# Patient Record
Sex: FEMALE | Race: WHITE | Employment: OTHER | URBAN - METROPOLITAN AREA
[De-identification: names, ages, dates, MRNs, and addresses within clinical notes are randomized per-mention and may not be internally consistent; named-entity substitution may affect disease eponyms.]

---

## 2018-03-14 ENCOUNTER — HOSPITAL ENCOUNTER (OUTPATIENT)
Dept: PREADMISSION TESTING | Age: 78
Discharge: HOME | End: 2018-03-14
Payer: MEDICARE

## 2018-03-14 VITALS
HEIGHT: 61 IN | SYSTOLIC BLOOD PRESSURE: 146 MMHG | HEART RATE: 74 BPM | DIASTOLIC BLOOD PRESSURE: 73 MMHG | WEIGHT: 141 LBS | BODY MASS INDEX: 26.62 KG/M2 | TEMPERATURE: 97.6 F

## 2018-03-14 LAB
ABO + RH BLD: NORMAL
ANION GAP SERPL CALC-SCNC: 6 MMOL/L (ref 5–15)
APPEARANCE UR: ABNORMAL
ATRIAL RATE: 73 BPM
BACTERIA URNS QL MICRO: ABNORMAL /HPF
BILIRUB UR QL: NEGATIVE
BLOOD GROUP ANTIBODIES SERPL: NORMAL
BUN SERPL-MCNC: 16 MG/DL (ref 6–20)
BUN/CREAT SERPL: 21 (ref 12–20)
CALCIUM SERPL-MCNC: 9.2 MG/DL (ref 8.5–10.1)
CALCULATED P AXIS, ECG09: 74 DEGREES
CALCULATED R AXIS, ECG10: 56 DEGREES
CALCULATED T AXIS, ECG11: 143 DEGREES
CHLORIDE SERPL-SCNC: 106 MMOL/L (ref 97–108)
CO2 SERPL-SCNC: 30 MMOL/L (ref 21–32)
COLOR UR: ABNORMAL
CREAT SERPL-MCNC: 0.75 MG/DL (ref 0.55–1.02)
DIAGNOSIS, 93000: NORMAL
EPITH CASTS URNS QL MICRO: ABNORMAL /LPF
ERYTHROCYTE [DISTWIDTH] IN BLOOD BY AUTOMATED COUNT: 12.3 % (ref 11.5–14.5)
EST. AVERAGE GLUCOSE BLD GHB EST-MCNC: 103 MG/DL
GLUCOSE SERPL-MCNC: 97 MG/DL (ref 65–100)
GLUCOSE UR STRIP.AUTO-MCNC: NEGATIVE MG/DL
HBA1C MFR BLD: 5.2 % (ref 4.2–6.3)
HCT VFR BLD AUTO: 40.9 % (ref 35–47)
HGB BLD-MCNC: 13.4 G/DL (ref 11.5–16)
HGB UR QL STRIP: NEGATIVE
HYALINE CASTS URNS QL MICRO: ABNORMAL /LPF (ref 0–5)
INR PPP: 1 (ref 0.9–1.1)
KETONES UR QL STRIP.AUTO: NEGATIVE MG/DL
LEUKOCYTE ESTERASE UR QL STRIP.AUTO: ABNORMAL
MCH RBC QN AUTO: 31.5 PG (ref 26–34)
MCHC RBC AUTO-ENTMCNC: 32.8 G/DL (ref 30–36.5)
MCV RBC AUTO: 96 FL (ref 80–99)
NITRITE UR QL STRIP.AUTO: POSITIVE
NRBC # BLD: 0 K/UL (ref 0–0.01)
NRBC BLD-RTO: 0 PER 100 WBC
P-R INTERVAL, ECG05: 138 MS
PH UR STRIP: 6.5 [PH] (ref 5–8)
PLATELET # BLD AUTO: 270 K/UL (ref 150–400)
PMV BLD AUTO: 11.1 FL (ref 8.9–12.9)
POTASSIUM SERPL-SCNC: 4.3 MMOL/L (ref 3.5–5.1)
PROT UR STRIP-MCNC: NEGATIVE MG/DL
PROTHROMBIN TIME: 10 SEC (ref 9–11.1)
Q-T INTERVAL, ECG07: 388 MS
QRS DURATION, ECG06: 76 MS
QTC CALCULATION (BEZET), ECG08: 477 MS
RBC # BLD AUTO: 4.26 M/UL (ref 3.8–5.2)
RBC #/AREA URNS HPF: ABNORMAL /HPF (ref 0–5)
SODIUM SERPL-SCNC: 142 MMOL/L (ref 136–145)
SP GR UR REFRACTOMETRY: 1.02 (ref 1–1.03)
SPECIMEN EXP DATE BLD: NORMAL
UA: UC IF INDICATED,UAUC: ABNORMAL
UROBILINOGEN UR QL STRIP.AUTO: 0.2 EU/DL (ref 0.2–1)
VENTRICULAR RATE, ECG03: 91 BPM
WBC # BLD AUTO: 7.2 K/UL (ref 3.6–11)
WBC URNS QL MICRO: ABNORMAL /HPF (ref 0–4)

## 2018-03-14 PROCEDURE — 85610 PROTHROMBIN TIME: CPT | Performed by: ORTHOPAEDIC SURGERY

## 2018-03-14 PROCEDURE — 86900 BLOOD TYPING SEROLOGIC ABO: CPT | Performed by: ORTHOPAEDIC SURGERY

## 2018-03-14 PROCEDURE — 87086 URINE CULTURE/COLONY COUNT: CPT | Performed by: ORTHOPAEDIC SURGERY

## 2018-03-14 PROCEDURE — 87077 CULTURE AEROBIC IDENTIFY: CPT | Performed by: ORTHOPAEDIC SURGERY

## 2018-03-14 PROCEDURE — 93005 ELECTROCARDIOGRAM TRACING: CPT

## 2018-03-14 PROCEDURE — 81001 URINALYSIS AUTO W/SCOPE: CPT | Performed by: ORTHOPAEDIC SURGERY

## 2018-03-14 PROCEDURE — 87186 SC STD MICRODIL/AGAR DIL: CPT | Performed by: ORTHOPAEDIC SURGERY

## 2018-03-14 PROCEDURE — 80048 BASIC METABOLIC PNL TOTAL CA: CPT | Performed by: ORTHOPAEDIC SURGERY

## 2018-03-14 PROCEDURE — 83036 HEMOGLOBIN GLYCOSYLATED A1C: CPT | Performed by: ORTHOPAEDIC SURGERY

## 2018-03-14 PROCEDURE — 85027 COMPLETE CBC AUTOMATED: CPT | Performed by: ORTHOPAEDIC SURGERY

## 2018-03-14 RX ORDER — ALBUTEROL SULFATE 90 UG/1
2 AEROSOL, METERED RESPIRATORY (INHALATION)
Status: ON HOLD | COMMUNITY

## 2018-03-14 RX ORDER — TOLTERODINE 4 MG/1
4 CAPSULE, EXTENDED RELEASE ORAL DAILY
Status: ON HOLD | COMMUNITY

## 2018-03-14 RX ORDER — RANITIDINE 150 MG/1
150 TABLET, FILM COATED ORAL 2 TIMES DAILY
COMMUNITY
End: 2022-10-16

## 2018-03-14 RX ORDER — CALCIUM CARBONATE/VITAMIN D3 500 MG-10
1 TABLET ORAL DAILY
Status: ON HOLD | COMMUNITY
End: 2021-04-26

## 2018-03-14 RX ORDER — BUDESONIDE AND FORMOTEROL FUMARATE DIHYDRATE 80; 4.5 UG/1; UG/1
2 AEROSOL RESPIRATORY (INHALATION) 2 TIMES DAILY
COMMUNITY
End: 2018-08-28

## 2018-03-14 RX ORDER — LEVOTHYROXINE SODIUM 100 UG/1
100 TABLET ORAL
Status: ON HOLD | COMMUNITY

## 2018-03-14 RX ORDER — DEXTROMETHORPHAN HYDROBROMIDE, GUAIFENESIN 5; 100 MG/5ML; MG/5ML
650 LIQUID ORAL EVERY 8 HOURS
COMMUNITY
End: 2018-04-10

## 2018-03-14 RX ORDER — MONTELUKAST SODIUM 10 MG/1
10 TABLET ORAL DAILY
Status: ON HOLD | COMMUNITY

## 2018-03-14 NOTE — ADVANCED PRACTICE NURSE
Preoperative instructions reviewed with patient. Patient given 2-6 packs of CHG wipes. Instructions reviewed and reviewed in class on use of CHG wipes. Patient given SSI infection FAQS sheet, as well as a  MRSA/MSSA treatment instruction sheet  With an explanation to patient that they will be notified if treatment instructions need to be initiated. Patient was given the opportunity to ask questions on the information provided. Orthopedic pre-op assessment and planning form sent for scanning.

## 2018-03-15 LAB
BACTERIA SPEC CULT: NORMAL
BACTERIA SPEC CULT: NORMAL
SERVICE CMNT-IMP: NORMAL

## 2018-03-16 LAB
BACTERIA SPEC CULT: ABNORMAL
CC UR VC: ABNORMAL
SERVICE CMNT-IMP: ABNORMAL

## 2018-03-16 NOTE — PERIOP NOTES
Faxed PAT testing reports (and fax confirmation received) to 's office. Called at 3:50 pm on 2/16/18 spoke with Carlos Lindo RE: abnormal urine culture. Informed Carlos Lindo that NP would treat.

## 2018-03-19 PROBLEM — K21.9 GERD (GASTROESOPHAGEAL REFLUX DISEASE): Status: ACTIVE | Noted: 2018-03-19

## 2018-03-19 PROBLEM — H91.93 BILATERAL HEARING LOSS: Status: ACTIVE | Noted: 2018-03-19

## 2018-03-19 PROBLEM — J44.9 COPD (CHRONIC OBSTRUCTIVE PULMONARY DISEASE) (HCC): Status: ACTIVE | Noted: 2018-03-19

## 2018-03-19 RX ORDER — SULFAMETHOXAZOLE AND TRIMETHOPRIM 800; 160 MG/1; MG/1
1 TABLET ORAL 2 TIMES DAILY
Qty: 10 TAB | Refills: 0 | Status: SHIPPED | OUTPATIENT
Start: 2018-03-19 | End: 2018-03-24

## 2018-03-19 NOTE — ADVANCED PRACTICE NURSE
Patient is a 77 y/o female seen as a standard pre-op appointment on 3/14/18. Reviewed urine culture results on 3/19/18 and results were >100,000 colonies E. coli. Prescription for Bactrim DS e-prescribed to pharmacy of choice. Patient states she has baseline urinary frequency, taking Detrol for many years. Patient notified of prescription and possible side effects, and verbalized understanding of treatment regimen, goals of therapy, and UTI preventive measures. Provided contact info for further questions and reasons to follow up with PCP/surgeon, if needed.     CLARA Mckenna

## 2018-04-06 NOTE — H&P
[facility]  Location: [place] [address] [address]  Patient #: [id number]  : 1940   / Language: English / Race: White  Female      History of Present Illness The patient is a 78year old female who presents to the practice today for a transition into care. Additional reasons for visit:    Hip Pain is described as the following: The onset of the hip pain has been gradual and has been occurring in a persistent pattern for 6 months. The course has been gradually worsening. The hip pain is described as a severe dull aching. The hip pain is described as being located in the hip (right). The pain is aggravated by lying on affected side. Note for \"Hip Pain\": Patient's chief complaint is bilateral lower extremity pain. Right is worse than left. She has also some lumbar spinal stenosis symptoms. Her right hip is painful. She has to lift her leg into and out of the car. She has lost significant range of motion of the right side. She was seen by another orthopedist to discuss hip replacement. She's now seeing me to discuss hip replacement. Problem List/Past Medical REVIEW OF SYSTEMS: Systems were reviewed by the provider.    STENOSIS (724.02)    DDD (degenerative disc disease), lumbar (722.52  M51.36)    SPONDYLOLISTHESIS, ACQ. (738.4)    Hypertension, goal below 140/90 (401.9  I10)    Low back pain (724.2  M54.5)    Lumbar spondylosis (721.3) (721.3  M47.816)    Unilateral osteoarthritis of hip, right (715.95  M16.11)    Elective surgery (V50.9) (V50.9  Z41. 9)    SCIATICA (724.3) (724.3  M54.30)    Aftercare following surgery of the musculoskeletal system (V58.78  Z47.89)    Radiculopathy of lumbar region (724.4)      Allergies Kelli Waldron; 2018 1:24 PM)  No Known Allergies  [2018]:  No Known Drug Allergies  [2018]: Allergies Reconciled      Family History Bladder problems      Social History   Exercise   2013: Walks 3-4 times a week.   Tobacco / smoke exposure   None. Tobacco use   0.5 packs per day, started smoking in 1943 at age 5, quit smoking in 26 at age 41 (25 pack years), smokes 0 cigar(s) per week, uses 0 can(s) of smokeless tobacco per week. Illicit drug use   86/05/3410: none  Seat Belt Use   07/08/2013: always  HIV risk factors   07/08/2013: no  Sun Exposure   07/08/2013: frequently  Alcohol use   07/08/2013: Drinks wine 7 times per week having 1-2 drinks per occasion, never having more than 5 drinks per occasion. Caffeine use   07/08/2013: Drinks coffee 7 or more times a day. Medication History  Levothyroxine Sodium  (88MCG Tablet, Oral AS directed) Active. Spiriva HandiHaler  (18MCG Capsule, Inhalation DAILy) Active. VESIcare  (5MG Tablet, Oral AS directed) Active. Vitamin D  (1000UNIT Capsule, 2 Oral DAILy) Active. Multiple Vitamin  (1 Oral Daily) Active. Calcium/Vitamin D/Minerals  (1 Oral AS directed) Specific strength unknown - Active. Aspirin  (81MG Tablet, 1 Oral DAILy) Active. Tylenol  (325MG Tablet, Oral) Active. Medications Reconciled     Past Surgical History   Hysterectomy   non-cancerous: partial  Other Eye Surgery   bilateral  Neck Disc Surgery    Gallbladder Surgery   laparoscopic  Tonsillectomy    Anal Fissure Repair      Other Problems  Pulmonary Disease    Asthma    Thyroid Disease    Unspecified Diagnosis          Review of Systems   General Not Present- Appetite Loss, Chills, Fatigue, Fever, Night Sweats, Weight Gain and Weight Loss. Skin Not Present- Itching, Nail Changes, Poor Wound Healing, Rash, Skin Color Changes, Suspicious Lesions and Yellowish Skin Color. Respiratory Present- Difficulty Breathing. Not Present- Bloody sputum, Chronic Cough, Snoring, Wakes up from Sleep Wheezing or Short of Breath and Wheezing. Cardiovascular Present- Difficulty Breathing On Exertion.  Not Present- Bluish Discoloration Of Lips Or Nails, Chest Pain, Difficulty Breathing Lying Down, Leg Cramps With Exertion, Palpitations and Swelling of Extremities. Gastrointestinal Not Present- Abdominal Pain, Black, Tarry Stool, Change in Bowel Habits, Cirrhosis, Constipation, Diarrhea, Difficulty Swallowing, Nausea and Vomiting. Musculoskeletal Not Present- Back Pain, Joint Pain, Joint Stiffness and Joint Swelling. Neurological Present- Unsteadiness. Not Present- Fainting, Headaches, Memory Loss, Numbness, Seizures, Tingling, Tremor and Weakness. Vitals  Weight: 140 lb   Height: 61 in   Body Surface Area: 1.62 m²   Body Mass Index: 26.45 kg/m²        Physical Exam  Musculoskeletal  Global Assessment  Examination of related systems reveals - well-developed, well-nourished, in no acute distress, alert and oriented x 3. Right Lower Extremity - Note: Patient's right hip was examined. She has very limited range of motion. Impingement test and logroll tests are both positive. Her right lower extremity is otherwise sensate and perfused. Her hip extends fully and flexes to 90°. Assessment & Plan   Primary osteoarthritis of right hip (715.15  M16.11)  Impression: Severe DJD of the right hip. X-rays and exam are consistent with this. We discussed options. She would like to proceed. Right total hip that needs to discuss this point. All questions were answered. Information was given. She can call back to schedule. Current Plans  Pt Education - How to access health information online: discussed with patient and provided information. Pt Education - Educational materials were provided.: discussed with patient and provided information. X-RAY EXAM OF HIP COMPLETE min 2 VIEWS (12349) (Right 3 views of her right hip. End-stage DJD of the right hip.  Bone to bone.)  REVIEW OF SYSTEMS: Systems were reviewed by the provider.(V49.9)        Signed by Anthony Culver MD

## 2018-04-09 ENCOUNTER — ANESTHESIA (OUTPATIENT)
Dept: SURGERY | Age: 78
DRG: 470 | End: 2018-04-09
Payer: MEDICARE

## 2018-04-09 ENCOUNTER — HOSPITAL ENCOUNTER (INPATIENT)
Age: 78
LOS: 1 days | Discharge: HOME HEALTH | DRG: 470 | End: 2018-04-10
Attending: ORTHOPAEDIC SURGERY | Admitting: ORTHOPAEDIC SURGERY
Payer: MEDICARE

## 2018-04-09 ENCOUNTER — APPOINTMENT (OUTPATIENT)
Dept: GENERAL RADIOLOGY | Age: 78
DRG: 470 | End: 2018-04-09
Attending: ORTHOPAEDIC SURGERY
Payer: MEDICARE

## 2018-04-09 ENCOUNTER — APPOINTMENT (OUTPATIENT)
Dept: GENERAL RADIOLOGY | Age: 78
DRG: 470 | End: 2018-04-09
Attending: PHYSICIAN ASSISTANT
Payer: MEDICARE

## 2018-04-09 ENCOUNTER — ANESTHESIA EVENT (OUTPATIENT)
Dept: SURGERY | Age: 78
DRG: 470 | End: 2018-04-09
Payer: MEDICARE

## 2018-04-09 DIAGNOSIS — M16.11 PRIMARY OSTEOARTHRITIS OF RIGHT HIP: Primary | ICD-10-CM

## 2018-04-09 LAB
ABO + RH BLD: NORMAL
BLOOD GROUP ANTIBODIES SERPL: NORMAL
GLUCOSE BLD STRIP.AUTO-MCNC: 90 MG/DL (ref 65–100)
SERVICE CMNT-IMP: NORMAL
SPECIMEN EXP DATE BLD: NORMAL

## 2018-04-09 PROCEDURE — 77030020365 HC SOL INJ SOD CL 0.9% 50ML: Performed by: ORTHOPAEDIC SURGERY

## 2018-04-09 PROCEDURE — 94640 AIRWAY INHALATION TREATMENT: CPT

## 2018-04-09 PROCEDURE — 76010000171 HC OR TIME 2 TO 2.5 HR INTENSV-TIER 1: Performed by: ORTHOPAEDIC SURGERY

## 2018-04-09 PROCEDURE — 94664 DEMO&/EVAL PT USE INHALER: CPT

## 2018-04-09 PROCEDURE — 74011000258 HC RX REV CODE- 258: Performed by: ORTHOPAEDIC SURGERY

## 2018-04-09 PROCEDURE — 77030018846 HC SOL IRR STRL H20 ICUM -A: Performed by: ORTHOPAEDIC SURGERY

## 2018-04-09 PROCEDURE — C1776 JOINT DEVICE (IMPLANTABLE): HCPCS | Performed by: ORTHOPAEDIC SURGERY

## 2018-04-09 PROCEDURE — 77010033678 HC OXYGEN DAILY

## 2018-04-09 PROCEDURE — 72170 X-RAY EXAM OF PELVIS: CPT

## 2018-04-09 PROCEDURE — 74011250637 HC RX REV CODE- 250/637: Performed by: PHYSICIAN ASSISTANT

## 2018-04-09 PROCEDURE — 76210000001 HC OR PH I REC 2.5 TO 3 HR: Performed by: ORTHOPAEDIC SURGERY

## 2018-04-09 PROCEDURE — 74011250636 HC RX REV CODE- 250/636

## 2018-04-09 PROCEDURE — 74011250636 HC RX REV CODE- 250/636: Performed by: ORTHOPAEDIC SURGERY

## 2018-04-09 PROCEDURE — 74011250636 HC RX REV CODE- 250/636: Performed by: PHYSICIAN ASSISTANT

## 2018-04-09 PROCEDURE — 74011000250 HC RX REV CODE- 250

## 2018-04-09 PROCEDURE — 76060000035 HC ANESTHESIA 2 TO 2.5 HR: Performed by: ORTHOPAEDIC SURGERY

## 2018-04-09 PROCEDURE — 77030029684 HC NEB SM VOL KT MONA -A

## 2018-04-09 PROCEDURE — C9290 INJ, BUPIVACAINE LIPOSOME: HCPCS | Performed by: ORTHOPAEDIC SURGERY

## 2018-04-09 PROCEDURE — 82962 GLUCOSE BLOOD TEST: CPT

## 2018-04-09 PROCEDURE — 65270000029 HC RM PRIVATE

## 2018-04-09 PROCEDURE — 77030006822 HC BLD SAW SAG BRSM -B: Performed by: ORTHOPAEDIC SURGERY

## 2018-04-09 PROCEDURE — 77030020788: Performed by: ORTHOPAEDIC SURGERY

## 2018-04-09 PROCEDURE — 36415 COLL VENOUS BLD VENIPUNCTURE: CPT | Performed by: NURSE PRACTITIONER

## 2018-04-09 PROCEDURE — 86900 BLOOD TYPING SEROLOGIC ABO: CPT | Performed by: NURSE PRACTITIONER

## 2018-04-09 PROCEDURE — 77030011640 HC PAD GRND REM COVD -A: Performed by: ORTHOPAEDIC SURGERY

## 2018-04-09 PROCEDURE — 77030031139 HC SUT VCRL2 J&J -A: Performed by: ORTHOPAEDIC SURGERY

## 2018-04-09 PROCEDURE — 74011000250 HC RX REV CODE- 250: Performed by: PHYSICIAN ASSISTANT

## 2018-04-09 PROCEDURE — 77030029372 HC ADH SKN CLSR PRINEO J&J -C: Performed by: ORTHOPAEDIC SURGERY

## 2018-04-09 PROCEDURE — 0SR904A REPLACEMENT OF RIGHT HIP JOINT WITH CERAMIC ON POLYETHYLENE SYNTHETIC SUBSTITUTE, UNCEMENTED, OPEN APPROACH: ICD-10-PCS | Performed by: ORTHOPAEDIC SURGERY

## 2018-04-09 PROCEDURE — 77030008684 HC TU ET CUF COVD -B: Performed by: ANESTHESIOLOGY

## 2018-04-09 PROCEDURE — 77030032490 HC SLV COMPR SCD KNE COVD -B

## 2018-04-09 PROCEDURE — 73501 X-RAY EXAM HIP UNI 1 VIEW: CPT

## 2018-04-09 PROCEDURE — 77030018836 HC SOL IRR NACL ICUM -A: Performed by: ORTHOPAEDIC SURGERY

## 2018-04-09 PROCEDURE — 74011000250 HC RX REV CODE- 250: Performed by: ORTHOPAEDIC SURGERY

## 2018-04-09 PROCEDURE — 77030034850: Performed by: ORTHOPAEDIC SURGERY

## 2018-04-09 PROCEDURE — 74011250636 HC RX REV CODE- 250/636: Performed by: ANESTHESIOLOGY

## 2018-04-09 PROCEDURE — 77030012935 HC DRSG AQUACEL BMS -B: Performed by: ORTHOPAEDIC SURGERY

## 2018-04-09 PROCEDURE — 76000 FLUOROSCOPY <1 HR PHYS/QHP: CPT

## 2018-04-09 PROCEDURE — 77030002933 HC SUT MCRYL J&J -A: Performed by: ORTHOPAEDIC SURGERY

## 2018-04-09 DEVICE — SCREW BNE L50MM DIA6.5MM CANC HIP DOME PINN: Type: IMPLANTABLE DEVICE | Site: HIP | Status: FUNCTIONAL

## 2018-04-09 DEVICE — STEM FEM SZ 9 L130MM NK L38.5MM 38.5MM STD OFFSET 135DEG: Type: IMPLANTABLE DEVICE | Site: HIP | Status: FUNCTIONAL

## 2018-04-09 DEVICE — SCR ACET CANC PINN 6.5X15MM SS --: Type: IMPLANTABLE DEVICE | Site: HIP | Status: FUNCTIONAL

## 2018-04-09 DEVICE — HEAD FEM DIA32MM +5MM OFFSET 12/14 TAPR HIP CERAMIC BIOLOX: Type: IMPLANTABLE DEVICE | Site: HIP | Status: FUNCTIONAL

## 2018-04-09 DEVICE — LINER ACET OD50MM ID32MM NEUT OFFSET HIP ALTRX PINN: Type: IMPLANTABLE DEVICE | Site: HIP | Status: FUNCTIONAL

## 2018-04-09 DEVICE — CUP ACET DIA50MM HIP GRIPTION PRI CEMENTLESS FIX SECT SER: Type: IMPLANTABLE DEVICE | Site: HIP | Status: FUNCTIONAL

## 2018-04-09 DEVICE — COMPONENT TOT HIP PRIMARY CERM ALTRX: Type: IMPLANTABLE DEVICE | Site: HIP | Status: FUNCTIONAL

## 2018-04-09 DEVICE — ELIMINATOR H APEX FOR 48-60MM PINN HIP SHELL: Type: IMPLANTABLE DEVICE | Site: HIP | Status: FUNCTIONAL

## 2018-04-09 RX ORDER — OXYCODONE AND ACETAMINOPHEN 5; 325 MG/1; MG/1
1 TABLET ORAL AS NEEDED
Status: DISCONTINUED | OUTPATIENT
Start: 2018-04-09 | End: 2018-04-09 | Stop reason: HOSPADM

## 2018-04-09 RX ORDER — SODIUM CHLORIDE 0.9 % (FLUSH) 0.9 %
5-10 SYRINGE (ML) INJECTION EVERY 8 HOURS
Status: DISCONTINUED | OUTPATIENT
Start: 2018-04-09 | End: 2018-04-09 | Stop reason: HOSPADM

## 2018-04-09 RX ORDER — SODIUM CHLORIDE 0.9 % (FLUSH) 0.9 %
5-10 SYRINGE (ML) INJECTION EVERY 8 HOURS
Status: DISCONTINUED | OUTPATIENT
Start: 2018-04-10 | End: 2018-04-10 | Stop reason: HOSPADM

## 2018-04-09 RX ORDER — FENTANYL CITRATE 50 UG/ML
25 INJECTION, SOLUTION INTRAMUSCULAR; INTRAVENOUS
Status: DISCONTINUED | OUTPATIENT
Start: 2018-04-09 | End: 2018-04-09 | Stop reason: HOSPADM

## 2018-04-09 RX ORDER — MORPHINE SULFATE 4 MG/ML
INJECTION, SOLUTION INTRAMUSCULAR; INTRAVENOUS
Status: DISPENSED
Start: 2018-04-09 | End: 2018-04-10

## 2018-04-09 RX ORDER — MIDAZOLAM HYDROCHLORIDE 1 MG/ML
1 INJECTION, SOLUTION INTRAMUSCULAR; INTRAVENOUS AS NEEDED
Status: DISCONTINUED | OUTPATIENT
Start: 2018-04-09 | End: 2018-04-09 | Stop reason: HOSPADM

## 2018-04-09 RX ORDER — SODIUM CHLORIDE, SODIUM LACTATE, POTASSIUM CHLORIDE, CALCIUM CHLORIDE 600; 310; 30; 20 MG/100ML; MG/100ML; MG/100ML; MG/100ML
125 INJECTION, SOLUTION INTRAVENOUS CONTINUOUS
Status: DISCONTINUED | OUTPATIENT
Start: 2018-04-09 | End: 2018-04-09 | Stop reason: HOSPADM

## 2018-04-09 RX ORDER — LIDOCAINE HYDROCHLORIDE 20 MG/ML
INJECTION, SOLUTION EPIDURAL; INFILTRATION; INTRACAUDAL; PERINEURAL AS NEEDED
Status: DISCONTINUED | OUTPATIENT
Start: 2018-04-09 | End: 2018-04-09 | Stop reason: HOSPADM

## 2018-04-09 RX ORDER — OXYCODONE HYDROCHLORIDE 5 MG/1
10 TABLET ORAL
Status: DISCONTINUED | OUTPATIENT
Start: 2018-04-09 | End: 2018-04-10 | Stop reason: HOSPADM

## 2018-04-09 RX ORDER — SODIUM CHLORIDE 0.9 % (FLUSH) 0.9 %
5-10 SYRINGE (ML) INJECTION AS NEEDED
Status: DISCONTINUED | OUTPATIENT
Start: 2018-04-09 | End: 2018-04-09 | Stop reason: HOSPADM

## 2018-04-09 RX ORDER — FENTANYL CITRATE 50 UG/ML
50 INJECTION, SOLUTION INTRAMUSCULAR; INTRAVENOUS AS NEEDED
Status: DISCONTINUED | OUTPATIENT
Start: 2018-04-09 | End: 2018-04-09 | Stop reason: HOSPADM

## 2018-04-09 RX ORDER — SODIUM CHLORIDE 9 MG/ML
125 INJECTION, SOLUTION INTRAVENOUS CONTINUOUS
Status: DISPENSED | OUTPATIENT
Start: 2018-04-09 | End: 2018-04-10

## 2018-04-09 RX ORDER — ONDANSETRON 2 MG/ML
4 INJECTION INTRAMUSCULAR; INTRAVENOUS AS NEEDED
Status: DISCONTINUED | OUTPATIENT
Start: 2018-04-09 | End: 2018-04-09 | Stop reason: HOSPADM

## 2018-04-09 RX ORDER — VANCOMYCIN HYDROCHLORIDE
1250 EVERY 12 HOURS
Status: COMPLETED | OUTPATIENT
Start: 2018-04-09 | End: 2018-04-09

## 2018-04-09 RX ORDER — DEXAMETHASONE SODIUM PHOSPHATE 10 MG/ML
4 INJECTION INTRAMUSCULAR; INTRAVENOUS ONCE
Status: DISCONTINUED | OUTPATIENT
Start: 2018-04-09 | End: 2018-04-09 | Stop reason: HOSPADM

## 2018-04-09 RX ORDER — TRANEXAMIC ACID 100 MG/ML
INJECTION, SOLUTION INTRAVENOUS AS NEEDED
Status: DISCONTINUED | OUTPATIENT
Start: 2018-04-09 | End: 2018-04-09 | Stop reason: HOSPADM

## 2018-04-09 RX ORDER — OXYBUTYNIN CHLORIDE 5 MG/1
5 TABLET, EXTENDED RELEASE ORAL DAILY
Status: DISCONTINUED | OUTPATIENT
Start: 2018-04-10 | End: 2018-04-10 | Stop reason: HOSPADM

## 2018-04-09 RX ORDER — EPHEDRINE SULFATE 50 MG/ML
INJECTION, SOLUTION INTRAVENOUS AS NEEDED
Status: DISCONTINUED | OUTPATIENT
Start: 2018-04-09 | End: 2018-04-09 | Stop reason: HOSPADM

## 2018-04-09 RX ORDER — ARFORMOTEROL TARTRATE 15 UG/2ML
15 SOLUTION RESPIRATORY (INHALATION)
Status: DISCONTINUED | OUTPATIENT
Start: 2018-04-09 | End: 2018-04-10 | Stop reason: HOSPADM

## 2018-04-09 RX ORDER — LEVOTHYROXINE SODIUM 100 UG/1
100 TABLET ORAL
Status: DISCONTINUED | OUTPATIENT
Start: 2018-04-10 | End: 2018-04-10 | Stop reason: HOSPADM

## 2018-04-09 RX ORDER — ASPIRIN 325 MG
325 TABLET, DELAYED RELEASE (ENTERIC COATED) ORAL 2 TIMES DAILY
Status: DISCONTINUED | OUTPATIENT
Start: 2018-04-09 | End: 2018-04-10 | Stop reason: HOSPADM

## 2018-04-09 RX ORDER — TRAMADOL HYDROCHLORIDE 50 MG/1
50 TABLET ORAL
Status: DISCONTINUED | OUTPATIENT
Start: 2018-04-09 | End: 2018-04-10 | Stop reason: HOSPADM

## 2018-04-09 RX ORDER — ALBUTEROL SULFATE 0.83 MG/ML
2.5 SOLUTION RESPIRATORY (INHALATION)
Status: DISCONTINUED | OUTPATIENT
Start: 2018-04-09 | End: 2018-04-10 | Stop reason: HOSPADM

## 2018-04-09 RX ORDER — BUDESONIDE 0.5 MG/2ML
500 INHALANT ORAL
Status: DISCONTINUED | OUTPATIENT
Start: 2018-04-09 | End: 2018-04-10 | Stop reason: HOSPADM

## 2018-04-09 RX ORDER — FAMOTIDINE 20 MG/1
20 TABLET, FILM COATED ORAL 2 TIMES DAILY
Status: DISCONTINUED | OUTPATIENT
Start: 2018-04-09 | End: 2018-04-10 | Stop reason: HOSPADM

## 2018-04-09 RX ORDER — DIPHENHYDRAMINE HCL 12.5MG/5ML
12.5 ELIXIR ORAL
Status: ACTIVE | OUTPATIENT
Start: 2018-04-09 | End: 2018-04-10

## 2018-04-09 RX ORDER — ROCURONIUM BROMIDE 10 MG/ML
INJECTION, SOLUTION INTRAVENOUS AS NEEDED
Status: DISCONTINUED | OUTPATIENT
Start: 2018-04-09 | End: 2018-04-09 | Stop reason: HOSPADM

## 2018-04-09 RX ORDER — GLYCOPYRROLATE 0.2 MG/ML
INJECTION INTRAMUSCULAR; INTRAVENOUS AS NEEDED
Status: DISCONTINUED | OUTPATIENT
Start: 2018-04-09 | End: 2018-04-09 | Stop reason: HOSPADM

## 2018-04-09 RX ORDER — POLYETHYLENE GLYCOL 3350 17 G/17G
17 POWDER, FOR SOLUTION ORAL DAILY
Status: DISCONTINUED | OUTPATIENT
Start: 2018-04-10 | End: 2018-04-10 | Stop reason: HOSPADM

## 2018-04-09 RX ORDER — SODIUM CHLORIDE, SODIUM LACTATE, POTASSIUM CHLORIDE, CALCIUM CHLORIDE 600; 310; 30; 20 MG/100ML; MG/100ML; MG/100ML; MG/100ML
INJECTION, SOLUTION INTRAVENOUS
Status: DISCONTINUED | OUTPATIENT
Start: 2018-04-09 | End: 2018-04-09 | Stop reason: HOSPADM

## 2018-04-09 RX ORDER — NEOSTIGMINE METHYLSULFATE 1 MG/ML
INJECTION INTRAVENOUS AS NEEDED
Status: DISCONTINUED | OUTPATIENT
Start: 2018-04-09 | End: 2018-04-09 | Stop reason: HOSPADM

## 2018-04-09 RX ORDER — ONDANSETRON 2 MG/ML
4 INJECTION INTRAMUSCULAR; INTRAVENOUS
Status: ACTIVE | OUTPATIENT
Start: 2018-04-09 | End: 2018-04-10

## 2018-04-09 RX ORDER — DEXAMETHASONE SODIUM PHOSPHATE 4 MG/ML
INJECTION, SOLUTION INTRA-ARTICULAR; INTRALESIONAL; INTRAMUSCULAR; INTRAVENOUS; SOFT TISSUE AS NEEDED
Status: DISCONTINUED | OUTPATIENT
Start: 2018-04-09 | End: 2018-04-09 | Stop reason: HOSPADM

## 2018-04-09 RX ORDER — SODIUM CHLORIDE 9 MG/ML
50 INJECTION, SOLUTION INTRAVENOUS CONTINUOUS
Status: DISCONTINUED | OUTPATIENT
Start: 2018-04-09 | End: 2018-04-09 | Stop reason: HOSPADM

## 2018-04-09 RX ORDER — MORPHINE SULFATE 4 MG/ML
2 INJECTION, SOLUTION INTRAMUSCULAR; INTRAVENOUS ONCE
Status: ACTIVE | OUTPATIENT
Start: 2018-04-09 | End: 2018-04-10

## 2018-04-09 RX ORDER — PROPOFOL 10 MG/ML
INJECTION, EMULSION INTRAVENOUS AS NEEDED
Status: DISCONTINUED | OUTPATIENT
Start: 2018-04-09 | End: 2018-04-09 | Stop reason: HOSPADM

## 2018-04-09 RX ORDER — AMOXICILLIN 250 MG
1 CAPSULE ORAL 2 TIMES DAILY
Status: DISCONTINUED | OUTPATIENT
Start: 2018-04-09 | End: 2018-04-10 | Stop reason: HOSPADM

## 2018-04-09 RX ORDER — MORPHINE SULFATE 10 MG/ML
2 INJECTION, SOLUTION INTRAMUSCULAR; INTRAVENOUS
Status: DISCONTINUED | OUTPATIENT
Start: 2018-04-09 | End: 2018-04-09 | Stop reason: HOSPADM

## 2018-04-09 RX ORDER — ACETAMINOPHEN 500 MG
1000 TABLET ORAL EVERY 6 HOURS
Status: DISCONTINUED | OUTPATIENT
Start: 2018-04-09 | End: 2018-04-10 | Stop reason: HOSPADM

## 2018-04-09 RX ORDER — MONTELUKAST SODIUM 10 MG/1
10 TABLET ORAL DAILY
Status: DISCONTINUED | OUTPATIENT
Start: 2018-04-10 | End: 2018-04-10 | Stop reason: HOSPADM

## 2018-04-09 RX ORDER — ONDANSETRON 2 MG/ML
INJECTION INTRAMUSCULAR; INTRAVENOUS AS NEEDED
Status: DISCONTINUED | OUTPATIENT
Start: 2018-04-09 | End: 2018-04-09 | Stop reason: HOSPADM

## 2018-04-09 RX ORDER — MIDAZOLAM HYDROCHLORIDE 1 MG/ML
INJECTION, SOLUTION INTRAMUSCULAR; INTRAVENOUS AS NEEDED
Status: DISCONTINUED | OUTPATIENT
Start: 2018-04-09 | End: 2018-04-09 | Stop reason: HOSPADM

## 2018-04-09 RX ORDER — SODIUM CHLORIDE 9 MG/ML
1000 INJECTION, SOLUTION INTRAVENOUS CONTINUOUS
Status: DISCONTINUED | OUTPATIENT
Start: 2018-04-09 | End: 2018-04-09 | Stop reason: HOSPADM

## 2018-04-09 RX ORDER — PHENYLEPHRINE HCL IN 0.9% NACL 0.4MG/10ML
SYRINGE (ML) INTRAVENOUS AS NEEDED
Status: DISCONTINUED | OUTPATIENT
Start: 2018-04-09 | End: 2018-04-09 | Stop reason: HOSPADM

## 2018-04-09 RX ORDER — SODIUM CHLORIDE 9 MG/ML
INJECTION, SOLUTION INTRAVENOUS
Status: DISCONTINUED | OUTPATIENT
Start: 2018-04-09 | End: 2018-04-09 | Stop reason: HOSPADM

## 2018-04-09 RX ORDER — FENTANYL CITRATE 50 UG/ML
INJECTION, SOLUTION INTRAMUSCULAR; INTRAVENOUS AS NEEDED
Status: DISCONTINUED | OUTPATIENT
Start: 2018-04-09 | End: 2018-04-09 | Stop reason: HOSPADM

## 2018-04-09 RX ORDER — SODIUM CHLORIDE 0.9 % (FLUSH) 0.9 %
5-10 SYRINGE (ML) INJECTION AS NEEDED
Status: DISCONTINUED | OUTPATIENT
Start: 2018-04-09 | End: 2018-04-10 | Stop reason: HOSPADM

## 2018-04-09 RX ORDER — LIDOCAINE HYDROCHLORIDE 10 MG/ML
0.1 INJECTION, SOLUTION EPIDURAL; INFILTRATION; INTRACAUDAL; PERINEURAL AS NEEDED
Status: DISCONTINUED | OUTPATIENT
Start: 2018-04-09 | End: 2018-04-09 | Stop reason: HOSPADM

## 2018-04-09 RX ORDER — IPRATROPIUM BROMIDE 0.5 MG/2.5ML
0.5 SOLUTION RESPIRATORY (INHALATION)
Status: DISCONTINUED | OUTPATIENT
Start: 2018-04-09 | End: 2018-04-10 | Stop reason: HOSPADM

## 2018-04-09 RX ORDER — CELECOXIB 200 MG/1
200 CAPSULE ORAL ONCE
Status: COMPLETED | OUTPATIENT
Start: 2018-04-09 | End: 2018-04-09

## 2018-04-09 RX ORDER — FACIAL-BODY WIPES
10 EACH TOPICAL DAILY PRN
Status: DISCONTINUED | OUTPATIENT
Start: 2018-04-11 | End: 2018-04-10 | Stop reason: HOSPADM

## 2018-04-09 RX ORDER — NALOXONE HYDROCHLORIDE 0.4 MG/ML
0.4 INJECTION, SOLUTION INTRAMUSCULAR; INTRAVENOUS; SUBCUTANEOUS AS NEEDED
Status: DISCONTINUED | OUTPATIENT
Start: 2018-04-09 | End: 2018-04-10 | Stop reason: HOSPADM

## 2018-04-09 RX ORDER — ACETAMINOPHEN 500 MG
1000 TABLET ORAL ONCE
Status: COMPLETED | OUTPATIENT
Start: 2018-04-09 | End: 2018-04-09

## 2018-04-09 RX ORDER — LORATADINE 10 MG/1
10 TABLET ORAL
Status: DISCONTINUED | OUTPATIENT
Start: 2018-04-09 | End: 2018-04-10 | Stop reason: HOSPADM

## 2018-04-09 RX ORDER — MIDAZOLAM HYDROCHLORIDE 1 MG/ML
0.5 INJECTION, SOLUTION INTRAMUSCULAR; INTRAVENOUS
Status: DISCONTINUED | OUTPATIENT
Start: 2018-04-09 | End: 2018-04-09 | Stop reason: HOSPADM

## 2018-04-09 RX ORDER — CELECOXIB 200 MG/1
200 CAPSULE ORAL 2 TIMES DAILY
Status: DISCONTINUED | OUTPATIENT
Start: 2018-04-09 | End: 2018-04-10 | Stop reason: HOSPADM

## 2018-04-09 RX ORDER — VANCOMYCIN HYDROCHLORIDE
1250 ONCE
Status: DISCONTINUED | OUTPATIENT
Start: 2018-04-09 | End: 2018-04-09 | Stop reason: HOSPADM

## 2018-04-09 RX ORDER — DIPHENHYDRAMINE HYDROCHLORIDE 50 MG/ML
12.5 INJECTION, SOLUTION INTRAMUSCULAR; INTRAVENOUS AS NEEDED
Status: DISCONTINUED | OUTPATIENT
Start: 2018-04-09 | End: 2018-04-09 | Stop reason: HOSPADM

## 2018-04-09 RX ADMIN — EPHEDRINE SULFATE 10 MG: 50 INJECTION, SOLUTION INTRAVENOUS at 14:39

## 2018-04-09 RX ADMIN — PROPOFOL 30 MG: 10 INJECTION, EMULSION INTRAVENOUS at 13:30

## 2018-04-09 RX ADMIN — FENTANYL CITRATE 25 MCG: 50 INJECTION, SOLUTION INTRAMUSCULAR; INTRAVENOUS at 16:31

## 2018-04-09 RX ADMIN — SODIUM CHLORIDE, SODIUM LACTATE, POTASSIUM CHLORIDE, CALCIUM CHLORIDE: 600; 310; 30; 20 INJECTION, SOLUTION INTRAVENOUS at 13:30

## 2018-04-09 RX ADMIN — PROPOFOL 100 MG: 10 INJECTION, EMULSION INTRAVENOUS at 13:38

## 2018-04-09 RX ADMIN — LIDOCAINE HYDROCHLORIDE 40 MG: 20 INJECTION, SOLUTION EPIDURAL; INFILTRATION; INTRACAUDAL; PERINEURAL at 13:38

## 2018-04-09 RX ADMIN — MORPHINE SULFATE 2 MG: 10 INJECTION, SOLUTION INTRAMUSCULAR; INTRAVENOUS at 17:32

## 2018-04-09 RX ADMIN — VANCOMYCIN HYDROCHLORIDE 1250 MG: 10 INJECTION, POWDER, LYOPHILIZED, FOR SOLUTION INTRAVENOUS at 20:07

## 2018-04-09 RX ADMIN — ROCURONIUM BROMIDE 50 MG: 10 INJECTION, SOLUTION INTRAVENOUS at 13:39

## 2018-04-09 RX ADMIN — SODIUM CHLORIDE: 9 INJECTION, SOLUTION INTRAVENOUS at 15:47

## 2018-04-09 RX ADMIN — DEXAMETHASONE SODIUM PHOSPHATE 6 MG: 4 INJECTION, SOLUTION INTRA-ARTICULAR; INTRALESIONAL; INTRAMUSCULAR; INTRAVENOUS; SOFT TISSUE at 14:26

## 2018-04-09 RX ADMIN — ASPIRIN 325 MG: 325 TABLET, DELAYED RELEASE ORAL at 20:06

## 2018-04-09 RX ADMIN — ARFORMOTEROL TARTRATE 15 MCG: 15 SOLUTION RESPIRATORY (INHALATION) at 22:04

## 2018-04-09 RX ADMIN — FAMOTIDINE 20 MG: 20 TABLET, FILM COATED ORAL at 20:06

## 2018-04-09 RX ADMIN — CELECOXIB 200 MG: 200 CAPSULE ORAL at 23:02

## 2018-04-09 RX ADMIN — GLYCOPYRROLATE 0.4 MG: 0.2 INJECTION INTRAMUSCULAR; INTRAVENOUS at 15:09

## 2018-04-09 RX ADMIN — CELECOXIB 200 MG: 200 CAPSULE ORAL at 13:06

## 2018-04-09 RX ADMIN — MORPHINE SULFATE 2 MG: 10 INJECTION, SOLUTION INTRAMUSCULAR; INTRAVENOUS at 16:42

## 2018-04-09 RX ADMIN — ACETAMINOPHEN 1000 MG: 500 TABLET, FILM COATED ORAL at 20:05

## 2018-04-09 RX ADMIN — MIDAZOLAM HYDROCHLORIDE 2 MG: 1 INJECTION, SOLUTION INTRAMUSCULAR; INTRAVENOUS at 13:30

## 2018-04-09 RX ADMIN — IPRATROPIUM BROMIDE 0.5 MG: 0.5 SOLUTION RESPIRATORY (INHALATION) at 22:04

## 2018-04-09 RX ADMIN — ONDANSETRON 4 MG: 2 INJECTION INTRAMUSCULAR; INTRAVENOUS at 15:19

## 2018-04-09 RX ADMIN — ACETAMINOPHEN 1000 MG: 500 TABLET, FILM COATED ORAL at 13:06

## 2018-04-09 RX ADMIN — ONDANSETRON 4 MG: 2 INJECTION INTRAMUSCULAR; INTRAVENOUS at 17:27

## 2018-04-09 RX ADMIN — FENTANYL CITRATE 100 MCG: 50 INJECTION, SOLUTION INTRAMUSCULAR; INTRAVENOUS at 13:36

## 2018-04-09 RX ADMIN — STANDARDIZED SENNA CONCENTRATE AND DOCUSATE SODIUM 1 TABLET: 8.6; 5 TABLET, FILM COATED ORAL at 20:05

## 2018-04-09 RX ADMIN — Medication 80 MCG: at 15:03

## 2018-04-09 RX ADMIN — NEOSTIGMINE METHYLSULFATE 2 MG: 1 INJECTION INTRAVENOUS at 15:09

## 2018-04-09 RX ADMIN — BUDESONIDE 500 MCG: 0.5 INHALANT RESPIRATORY (INHALATION) at 22:04

## 2018-04-09 RX ADMIN — MORPHINE SULFATE 2 MG: 10 INJECTION, SOLUTION INTRAMUSCULAR; INTRAVENOUS at 17:22

## 2018-04-09 RX ADMIN — ACETAMINOPHEN 1000 MG: 500 TABLET, FILM COATED ORAL at 23:23

## 2018-04-09 RX ADMIN — Medication 80 MCG: at 13:46

## 2018-04-09 RX ADMIN — MORPHINE SULFATE 2 MG: 10 INJECTION, SOLUTION INTRAMUSCULAR; INTRAVENOUS at 16:32

## 2018-04-09 NOTE — IP AVS SNAPSHOT
[address] 
[phone] Patient: Chris Mullen MRN: [id number] QMT:7/93/2025 About your hospitalization You were admitted on:  April 9, 2018 [facility] You were discharged on:  April 10, 2018 Why you were hospitalized Your primary diagnosis was:  Not on File Your diagnoses also included:  Osteoarthritis Of Right Hip Follow-up Information Follow up With Details Comments Contact Info Mateo Thompson MD   [address] [place] [id number] [phone] [address] [facility] skilled nursing and [address] [place] [address] 
[phone] Discharge Orders None A check iván indicates which time of day the medication should be taken. My Medications START taking these medications Instructions Each Dose to Equal  
 Morning Noon Evening Bedtime  
 aspirin delayed-release 325 mg tablet Replaces:  aspirin 81 mg chewable tablet Your last dose was: Your next dose is: Take 1 Tab by mouth two (2) times a day. 325 mg  
    
   
   
   
  
 oxyCODONE IR 5 mg immediate release tablet Commonly known as:  Buretad Ramirez Your last dose was: Your next dose is: Take 1-2 Tabs by mouth every four (4) hours as needed. Max Daily Amount: 60 mg.  
 5-10 mg CONTINUE taking these medications Instructions Each Dose to Equal  
 Morning Noon Evening Bedtime Calcium-Cholecalciferol (D3) 500 mg(1,250mg) -400 unit Tab Your last dose was: Your next dose is: Take 1 Tab by mouth daily. 1 Tab  
    
   
   
   
  
 levothyroxine 100 mcg tablet Commonly known as:  SYNTHROID Your last dose was: Your next dose is: Take 100 mcg by mouth Daily (before breakfast). 100 mcg loratadine 10 mg tablet Commonly known as:  Joanne Aviles Your last dose was: Your next dose is: Take 10 mg by mouth as needed. 10 mg  
    
   
   
   
  
 MULTIVITAMIN PO Your last dose was: Your next dose is: Take 1 Tab by mouth daily. 1 Tab PROAIR HFA 90 mcg/actuation inhaler Generic drug:  albuterol Your last dose was: Your next dose is: Take 2 Puffs by inhalation every four (4) hours as needed for Wheezing. 2 Puff  
    
   
   
   
  
 raNITIdine 150 mg tablet Commonly known as:  ZANTAC Your last dose was: Your next dose is: Take 150 mg by mouth two (2) times a day. 150 mg  
    
   
   
   
  
 SINGULAIR 10 mg tablet Generic drug:  montelukast  
   
Your last dose was: Your next dose is: Take 10 mg by mouth daily. 10 mg  
    
   
   
   
  
 SPIRIVA WITH HANDIHALER 18 mcg inhalation capsule Generic drug:  tiotropium Your last dose was: Your next dose is: Take 1 Cap by inhalation daily. 1 Cap SYMBICORT 80-4.5 mcg/actuation Hfaa Generic drug:  budesonide-formoterol Your last dose was: Your next dose is: Take 2 Puffs by inhalation two (2) times a day. 2 Puff  
    
   
   
   
  
 tolterodine ER 4 mg ER capsule Commonly known as:  Nesha Scott Your last dose was: Your next dose is: Take 4 mg by mouth daily. 4 mg STOP taking these medications ASPERCREME 10 % lotion Generic drug:  trolamine salicylate  
   
  
 aspirin 81 mg chewable tablet Replaced by:  aspirin delayed-release 325 mg tablet PREMARIN 0.625 mg/gram vaginal cream  
Generic drug:  conjugated estrogens TYLENOL ARTHRITIS PAIN 650 mg Charlcie Persons Generic drug:  acetaminophen VITAMIN D3 1,000 unit Cap Generic drug:  cholecalciferol Where to Get Your Medications Information on where to get these meds will be given to you by the nurse or doctor. ! Ask your nurse or doctor about these medications  
  aspirin delayed-release 325 mg tablet  
 oxyCODONE IR 5 mg immediate release tablet Opioid Education Prescription Opioids: What You Need to Know: 
 
Prescription opioids can be used to help relieve moderate-to-severe pain and are often prescribed following a surgery or injury, or for certain health conditions. These medications can be an important part of treatment but also come with serious risks. Opioids are strong pain medicines. Examples include hydrocodone, oxycodone, fentanyl, and morphine. Heroin is an example of an illegal opioid. It is important to work with your health care provider to make sure you are getting the safest, most effective care. WHAT ARE THE RISKS AND SIDE EFFECTS OF OPIOID USE? Prescription opioids carry serious risks of addiction and overdose, especially with prolonged use. An opioid overdose, often marked by slow breathing, can cause sudden death. The use of prescription opioids can have a number of side effects as well, even when taken as directed. · Tolerance-meaning you might need to take more of a medication for the same pain relief · Physical dependence-meaning you have symptoms of withdrawal when the medication is stopped. Withdrawal symptoms can include nausea, sweating, chills, diarrhea, stomach cramps, and muscle aches. Withdrawal can last up to several weeks, depending on which drug you took and how long you took it. · Increased sensitivity to pain · Constipation · Nausea, vomiting, and dry mouth · Sleepiness and dizziness · Confusion · Depression · Low levels of testosterone that can result in lower sex drive, energy, and strength · Itching and sweating RISKS ARE GREATER WITH:      
 · History of drug misuse, substance use disorder, or overdose · Mental health conditions (such as depression or anxiety) · Sleep apnea · Older age (72 years or older) · Pregnancy Avoid alcohol while taking prescription opioids. Also, unless specifically advised by your health care provider, medications to avoid include: · Benzodiazepines (such as Xanax or Valium) · Muscle relaxants (such as Soma or Flexeril) · Hypnotics (such as Ambien or Lunesta) · Other prescription opioids KNOW YOUR OPTIONS Talk to your health care provider about ways to manage your pain that don't involve prescription opioids. Some of these options may actually work better and have fewer risks and side effects. Options may include: 
· Pain relievers such as acetaminophen, ibuprofen, and naproxen · Some medications that are also used for depression or seizures · Physical therapy and exercise · Counseling to help patients learn how to cope better with triggers of pain and stress. · Application of heat or cold compress · Massage therapy · Relaxation techniques Be Informed Make sure you know the name of your medication, how much and how often to take it, and its potential risks & side effects. IF YOU ARE PRESCRIBED OPIOIDS FOR PAIN: 
· Never take opioids in greater amounts or more often than prescribed. Remember the goal is not to be pain-free but to manage your pain at a tolerable level. · Follow up with your primary care provider to: · Work together to create a plan on how to manage your pain. · Talk about ways to help manage your pain that don't involve prescription opioids. · Talk about any and all concerns and side effects. · Help prevent misuse and abuse. · Never sell or share prescription opioids · Help prevent misuse and abuse. · Store prescription opioids in a secure place and out of reach of others (this may include visitors, children, friends, and family). · Safely dispose of unused/unwanted prescription opioids: Find your community drug take-back program or your pharmacy mail-back program, or flush them down the toilet, following guidance from the Food and Drug Administration (www.fda.gov/Drugs/ResourcesForYou). · Visit www.cdc.gov/drugoverdose to learn about the risks of opioid abuse and overdose. · If you believe you may be struggling with addiction, tell your health care provider and ask for guidance or call [facility] [employer] at 5-038-478-PMXG. Discharge Instructions Discharge Instructions Anterior Approach Hip Replacement Dr. Suzan Jaramillo Patient Natalia Rainey Date of procedure:4/9/2018 Procedure:Procedure(s): RIGHT TOTAL HIP ARTHROPLASTY  ANTERIOR APPROACH Surgeon:Surgeon(s) and Role: Harriett Johnson MD - Primary PCP: Chris Serrano MD 
Date of discharge: No discharge date for patient encounter. Follow up appointments ? Follow up with Dr. Suzan Jaramillo in 3 weeks. Call [phone] to make an appointment. ? If home health has been arranged for you the agency will contact you to arrange dates/times for visits. Please call them if you do not hear from them within 24 hours after you are discharged When to call your Orthopaedic Surgeon: Call [phone]. If you need to reach us after 5pm or on a weekend; call [phone] and the on call physician will be contacted ? Increased hip pain, unrelieved pain or if you have difficulty or are unable to walk ? Signs of infection-if your incision is red; continues to have drainage; drainage has a foul odor or if you have a persistent fever over 101 degrees ? Signs of a blood clot in your leg-calf pain, tenderness, redness, swelling of lower leg When to call your Primary Care Physician: 
? Concerns about medical conditions such as diabetes, high blood pressure, asthma, congestive heart failure ? Call if blood sugars are elevated, persistent headache or dizziness, coughing or congestion, constipation or diarrhea, burning with urination, abnormal heart rate When to call 911and go to the nearest emergency room ? Acute onset of chest pain, shortness of breath, difficulty breathing Activity ? Weight bearing as tolerated with walker or crutches. Refer to pages 23-33 of your handbook for instructions and pictures ? Complete your Home Exercise Program daily as instructed by your therapist.  Refer to pages 36-42 of your handbook for instructions and pictures ? Get up every one hour and walk (except at night when sleeping) ? AVOID sudden and extreme movement of your hip (surgical leg) ? Do not drive or operate heavy machinery Incision Care ? The Aquacel (brown, waterproof) surgical dressing is to remain on your hip for 7 days. On the 7th day have someone gently peel the dressing off by carefully lifting the edge and stretching it slightly to break the adhesive seal and leave incision open to air. You may take a shower with the Aquacel dressing in place. ? You do not have staples in your hip incision; if present they will be removed by the Home Health staff in 10-14 days and steri-strips will be applied. Leave the steri-strips on until they fall off Preventing blood clot ? Take Aspirin as prescribed Pain management ? Take pain medication as prescribed; decrease the amount you use as your pain lessens ? Avoid alcoholic beverages while taking pain medication ? Do not take any over-the-counter medication except for Tylenol (acetaminophen) ? Please be aware that many medications contain Tylenol. We do not want you to over medicate so please read the information below as a guide. Do not take more than 3 Grams of Tylenol in a 24 hour period. (There are 1000 milligrams in one Gram) 
o 325 mg of Tylenol per tablet (do not take more than 9 tablets in 24 hours) o 500 mg of Tylenol per tablet (do not take more than 6 tablets in 24 hours) ? You may place an ice bag on your hip for 15-20 minutes after exercising and as needed throughout the day and night Diet Resume usual diet; drink plenty of fluids; eat foods high in fiber You may want to take a stool softener (such as Senokot-S or Colace) to prevent constipation while you are taking pain medication. If constipation occurs, take a laxative (such as Dulcolax tablets, Milk of Magnesia, or a suppository) Patient meets criteria for BUNDLED PAYMENT  
for Care Improvement Initiative Criteria Contact Information for Orthopedic Nurse Navigator:     
VALERY Delarosa, RN-BC 
N:[phone] B:[phone] [employer] Announcement We are excited to announce that we are making your provider's discharge notes available to you in [employer]. You will see these notes when they are completed and signed by the physician that discharged you from your recent hospital stay. If you have any questions or concerns about any information you see in [employer], please call the Health Information Department where you were seen or reach out to your Primary Care Provider for more information about your plan of care. Introducing [facility]! [facility] introduces [employer] patient portal. Now you can access parts of your medical record, email your doctor's office, and request medication refills online. 1. In your internet browser, go to [email or web address] 2. Click on the First Time User? Click Here link in the Sign In box. You will see the New Member Sign Up page. 3. Enter your [employer] Access Code exactly as it appears below. You will not need to use this code after youve completed the sign-up process. If you do not sign up before the expiration date, you must request a new code. · [employer] Access Code: [email or web address] Expires: 2018  9:06 AM 
 
 4. Enter the last four digits of your Social Security Number (xxxx) and Date of Birth (mm/dd/yyyy) as indicated and click Submit. You will be taken to the next sign-up page. 5. Create a [employer] ID. This will be your [employer] login ID and cannot be changed, so think of one that is secure and easy to remember. 6. Create a [employer] password. You can change your password at any time. 7. Enter your Password Reset Question and Answer. This can be used at a later time if you forget your password. 8. Enter your e-mail address. You will receive e-mail notification when new information is available in [address] 9. Click Sign Up. You can now view and download portions of your medical record. 10. Click the Download Summary menu link to download a portable copy of your medical information. If you have questions, please visit the Frequently Asked Questions section of the [employer] website. Remember, [employer] is NOT to be used for urgent needs. For medical emergencies, dial 911. Now available from your iPhone and Android! Introducing [facility] [place] As a [facility] patient, I wanted to make you aware of our electronic visit tool called [facility]. [facility] 24/7 allows you to connect within minutes with a medical provider 24 hours a day, seven days a week via a mobile device or tablet or logging into a secure website from your computer. You can access [facility] Scotch[facility]afin from anywhere in the [place]. A virtual visit might be right for you when you have a simple condition and feel like you just dont want to get out of bed, or cant get away from work for an appointment, when your regular [facility] provider is not available (evenings, weekends or holidays), or when youre out of town and need minor care.   Electronic visits cost only $49 and if the [facility] [place] provider determines a prescription is needed to treat your condition, one can be electronically transmitted to a nearby pharmacy*. Please take a moment to enroll today if you have not already done so. The enrollment process is free and takes just a few minutes. To enroll, please download the [employer] 24/7 brian to your tablet or phone, or visit [email or web address] to enroll on your computer. And, as an [address] [place] [address] patient with a [employer] account, the results of your visits will be scanned into your electronic medical record and your primary care provider will be able to view the scanned results. We urge you to continue to see your regular [employer] provider for your ongoing medical care. And while your primary care provider may not be the one available when you seek a [employer] virtual visit, the peace of mind you get from getting a real diagnosis real time can be priceless. For more information on [employer], view our Frequently Asked Questions (FAQs) at [email or web address]. Sincerely, 
 
Josey Raymundo MD 
Chief Medical Officer [facility] Page *:  certain medications cannot be prescribed via [employer] Providers Seen During Your Hospitalization Provider Specialty Primary office phone Mark Pereira MD Orthopedic Surgery [phone] Your Primary Care Physician (PCP) Primary Care Physician Office Phone Office Fax Lex Christopher [phone] You are allergic to the following Allergen Reactions Ceclor (Cefaclor) Hives Recent Documentation Height Weight BMI OB Status Smoking Status 1.537 m 64 kg 27.08 kg/m2 Hysterectomy Former Smoker Emergency Contacts Name Discharge Info Relation Home Work Mobile [address] CAREGIVER [3] Spouse [3] [phone] Patient Belongings The following personal items are in your possession at time of discharge: Dental Appliances: None  Visual Aid: Glasses             Clothing: Other (comment) (clothing bag to pacu) Please provide this summary of care documentation to your next provider. Signatures-by signing, you are acknowledging that this After Visit Summary has been reviewed with you and you have received a copy. Patient Signature:  ____________________________________________________________ Date:  ____________________________________________________________  
  
Isaura Michel Provider Signature:  ____________________________________________________________ Date:  ____________________________________________________________

## 2018-04-09 NOTE — OP NOTES
OPERATIVE REPORT  RIGHT TOTAL HIP REPLACEMENT (ANTERIOR APPROACH)    NAME: Mely Orr  MRN: [id number]  :  1940  AGE: 78 y.o. DATE OF SURGERY:  2018    PREOPERATIVE DIAGNOSIS: Severe degenerative joint disease, right hip. POSTOPERATIVE DIAGNOSIS: Severe degenerative joint disease, right hip. PROCEDURES PERFORMED: Right total hip replacement - Anterior approach    SURGEON: Cresencio Stephens MD.    ASSISTANT: Mirna Suarez PA-C    ANESTHESIA: General    ESTIMATED BLOOD LOSS: 125 mL. DRAINS: None. COMPLICATIONS: None. SPECIMENS REMOVED: None. PRE-OP ANTIBIOTIC: Ancef 2 gram    IMPLANT:   Implant Name Type Inv. Item Serial No.  Lot No. LRB No. Used Action   PLUG ACET APCL H ELIM POS STP --  - SNA  PLUG ACET APCL H ELIM POS STP --  NA [facility] [id number] Right 1 Implanted   LINER ACET PINN NEUT [id number] -- ALTRX - SNA  LINER ACET PINN NEUT 32X50 -- ALTRX NA [facility] [id number] Right 1 Implanted   CUP ACET SECTOR GRIPTION 50MM -- TI - SNA  CUP ACET SECTOR GRIPTION 50MM -- TI NA [facility] [id number] Right 1 Implanted   SCR ACET CANC PINN 6.5X15MM SS --  - SNA  SCR ACET CANC PINN 6.5X15MM SS --  NA [facility] [id number] Right 1 Implanted   SCR BNE ACET CANC PINN 6.5X50 -- SS - SNA  SCR BNE ACET CANC PINN 6.5X50 -- SS [facility] [id number] Right 1 Implanted   STEM FEM CORAIL STD COL SZ 9 --  - SNA  STEM FEM CORAIL STD COL SZ 9 --  NA [facility] [id number] Right 1 Implanted   HEAD FEM CER 32MM +5MM NK -- DELTA - SNA   HEAD FEM CER 32MM +5MM NK -- DELTA NA [facility] [id number] Right 1 Implanted       INDICATIONS: 78 yrs female  with severe DJD of the right hip. The patient's right hip has been progressive in terms of symptoms. The patient now has severe activity limitation. The patient has continued with conservative management without adequate relief or improvement of functional limitations. We discussed options and she wished to proceed with right total hip replacement. The patient has continued with conservative management without adequate relief or improvement of functional limitations. DESCRIPTION OF PROCEDURE: Anesthetic was initiated. Preoperative dose of IV  Ancef was given. Osorio catheter was placed. The right side was  confirmed as the operative side, prepped and draped in the usual sterile fashion. Skin was covered with Ioban occlusive dressing. Direct anterior exposure was made to the patient's hip through the sartorius tensor interval. Anterior hip vasculature was cauterized. Retractors were taken out to observe for bleeding and there was none. The capsule was identified, opened and T'd distally. The femoral neck was osteotomized. Femoral head was removed from the acetabulum, which was exposed and soft tissues were removed. The acetabulum was progressively  reamed to 49 and a 50 trial shell was impacted with good press-fit. This was removed and a 50 Depuy shell was impacted in the acetabulum in 40 degrees of abduction in an anatomic-type anteversion. Bone spurs were removed and 6.5 screws x2 were placed. The polyethylene liner was placed. Femur was positioned and elevated from the wound. The medullary canal was entered. Broached to a size 9. Calcar planed and then trialed. A +5 hip ball was the most appropriate for leg length and tension with a standard offset stem. The hip was dislocated. The anterior greater trochanter was trimmed down to enhance flexion, rotation and stability. The trial was removed and the real stem was impacted. The real hip ball was placed. The hip was reduced. After copious irrigation, the capsule was closed with #2 Vicryl sutures. I irrigated the skin, subcutaneous and deep wound. I closed the fascia of the tensor fascia eleonora with #2 Vicryl sutures. Skin and subcutaneous were irrigated. Soft tissues were infiltrated with local anesthetic.  Skin and subcutaneous were closed in a standard fashion. Sterile dressing was applied. There were no complications. No specimen was sent. The procedure was a RIGHT TOTAL HIP REPLACEMENT using a Depuy total hip construct. Sindhu Ahmadi PA-C was of vital assistance throughout the duration of the procedure. The patient was transferred to the recovery room in stable condition.

## 2018-04-09 NOTE — ANESTHESIA POSTPROCEDURE EVALUATION
Post-Anesthesia Evaluation and Assessment    Patient: Jimmy Cook MRN: [id number]  SSN: [id number]    YOB: 1940  Age: 78 y.o. Sex: female       Cardiovascular Function/Vital Signs  Visit Vitals    /57    Pulse 88    Temp 36.4 °C (97.6 °F)    Resp 15    Ht 5' 0.5\" (1.537 m)    Wt 64 kg (141 lb)    SpO2 98%    BMI 27.08 kg/m2       Patient is status post general anesthesia for Procedure(s):  RIGHT TOTAL HIP ARTHROPLASTY  ANTERIOR APPROACH. Nausea/Vomiting: None    Postoperative hydration reviewed and adequate. Pain:  Pain Scale 1: Numeric (0 - 10) (04/09/18 1230)  Pain Intensity 1: 6 (04/09/18 1230)   Managed    Neurological Status:   Neuro (WDL): Within Defined Limits (04/09/18 1242)   At baseline    Mental Status and Level of Consciousness: Arousable    Pulmonary Status:   O2 Device: Nasal cannula (04/09/18 1603)   Adequate oxygenation and airway patent    Complications related to anesthesia: None    Post-anesthesia assessment completed.  No concerns    Signed By: Enzo Waters MD     April 9, 2018

## 2018-04-09 NOTE — PROGRESS NOTES
TRANSFER - IN REPORT:    Verbal report received from [id number] Pedro Luis Collier RN(name) on Jimmy Handler  being received from [facility]) for routine post - op      Report consisted of patients Situation, Background, Assessment and   Recommendations(SBAR). Information from the following report(s) SBAR, Kardex, OR Summary, Procedure Summary, Intake/Output, MAR and Recent Results was reviewed with the receiving nurse. Opportunity for questions and clarification was provided. Assessment completed upon patients arrival to unit and care assumed.

## 2018-04-09 NOTE — IP AVS SNAPSHOT
[address] [facility] [address] [place] [address] 
[phone] Patient: Rober Madison MRN: [id number] LTK:[id number] A check iván indicates which time of day the medication should be taken. My Medications START taking these medications Instructions Each Dose to Equal  
 Morning Noon Evening Bedtime  
 aspirin delayed-release 325 mg tablet Replaces:  aspirin 81 mg chewable tablet Your last dose was: Your next dose is: Take 1 Tab by mouth two (2) times a day. 325 mg  
    
   
   
   
  
 oxyCODONE IR 5 mg immediate release tablet Commonly known as:  Onita Estimable Your last dose was: Your next dose is: Take 1-2 Tabs by mouth every four (4) hours as needed. Max Daily Amount: 60 mg.  
 5-10 mg CONTINUE taking these medications Instructions Each Dose to Equal  
 Morning Noon Evening Bedtime Calcium-Cholecalciferol (D3) 500 mg(1,250mg) -400 unit Tab Your last dose was: Your next dose is: Take 1 Tab by mouth daily. 1 Tab  
    
   
   
   
  
 levothyroxine 100 mcg tablet Commonly known as:  SYNTHROID Your last dose was: Your next dose is: Take 100 mcg by mouth Daily (before breakfast). 100 mcg  
    
   
   
   
  
 loratadine 10 mg tablet Commonly known as:  Frances Galea Your last dose was: Your next dose is: Take 10 mg by mouth as needed. 10 mg  
    
   
   
   
  
 MULTIVITAMIN PO Your last dose was: Your next dose is: Take 1 Tab by mouth daily. 1 Tab PROAIR HFA 90 mcg/actuation inhaler Generic drug:  albuterol Your last dose was: Your next dose is: Take 2 Puffs by inhalation every four (4) hours as needed for Wheezing. 2 Puff  
    
   
   
   
  
 raNITIdine 150 mg tablet Commonly known as:  ZANTAC Your last dose was: Your next dose is: Take 150 mg by mouth two (2) times a day. 150 mg  
    
   
   
   
  
 SINGULAIR 10 mg tablet Generic drug:  montelukast  
   
Your last dose was: Your next dose is: Take 10 mg by mouth daily. 10 mg  
    
   
   
   
  
 SPIRIVA WITH HANDIHALER 18 mcg inhalation capsule Generic drug:  tiotropium Your last dose was: Your next dose is: Take 1 Cap by inhalation daily. 1 Cap SYMBICORT 80-4.5 mcg/actuation Hfaa Generic drug:  budesonide-formoterol Your last dose was: Your next dose is: Take 2 Puffs by inhalation two (2) times a day. 2 Puff  
    
   
   
   
  
 tolterodine ER 4 mg ER capsule Commonly known as:  Pascual Lot Your last dose was: Your next dose is: Take 4 mg by mouth daily. 4 mg STOP taking these medications ASPERCREME 10 % lotion Generic drug:  trolamine salicylate  
   
  
 aspirin 81 mg chewable tablet Replaced by:  aspirin delayed-release 325 mg tablet PREMARIN 0.625 mg/gram vaginal cream  
Generic drug:  conjugated estrogens TYLENOL ARTHRITIS PAIN 650 mg [place] Chambers Generic drug:  acetaminophen VITAMIN D3 1,000 unit Cap Generic drug:  cholecalciferol Where to Get Your Medications Information on where to get these meds will be given to you by the nurse or doctor. ! Ask your nurse or doctor about these medications  
  aspirin delayed-release 325 mg tablet  
 oxyCODONE IR 5 mg immediate release tablet

## 2018-04-09 NOTE — PROGRESS NOTES
Primary Nurse Josue Aceves RN and Radha Negron RN performed a dual skin assessment on this patient No impairment noted  Alfonso score is 21

## 2018-04-09 NOTE — ANESTHESIA PREPROCEDURE EVALUATION
Anesthetic History   No history of anesthetic complications            Review of Systems / Medical History  Patient summary reviewed, nursing notes reviewed and pertinent labs reviewed    Pulmonary  Within defined limits  COPD               Neuro/Psych   Within defined limits           Cardiovascular  Within defined limits                     GI/Hepatic/Renal  Within defined limits              Endo/Other  Within defined limits    Hypothyroidism  Arthritis     Other Findings              Physical Exam    Airway  Mallampati: II  TM Distance: 4 - 6 cm  Neck ROM: normal range of motion   Mouth opening: Normal     Cardiovascular  Regular rate and rhythm,  S1 and S2 normal,  no murmur, click, rub, or gallop             Dental  No notable dental hx       Pulmonary  Breath sounds clear to auscultation               Abdominal  GI exam deferred       Other Findings            Anesthetic Plan    ASA: 2  Anesthesia type: general          Induction: Intravenous  Anesthetic plan and risks discussed with: Patient

## 2018-04-10 VITALS
HEART RATE: 85 BPM | TEMPERATURE: 97.9 F | BODY MASS INDEX: 26.62 KG/M2 | RESPIRATION RATE: 16 BRPM | DIASTOLIC BLOOD PRESSURE: 66 MMHG | OXYGEN SATURATION: 97 % | SYSTOLIC BLOOD PRESSURE: 104 MMHG | HEIGHT: 61 IN | WEIGHT: 141 LBS

## 2018-04-10 LAB
ANION GAP SERPL CALC-SCNC: 6 MMOL/L (ref 5–15)
BUN SERPL-MCNC: 12 MG/DL (ref 6–20)
BUN/CREAT SERPL: 16 (ref 12–20)
CALCIUM SERPL-MCNC: 8.1 MG/DL (ref 8.5–10.1)
CHLORIDE SERPL-SCNC: 108 MMOL/L (ref 97–108)
CO2 SERPL-SCNC: 25 MMOL/L (ref 21–32)
CREAT SERPL-MCNC: 0.75 MG/DL (ref 0.55–1.02)
GLUCOSE SERPL-MCNC: 115 MG/DL (ref 65–100)
HGB BLD-MCNC: 9 G/DL (ref 11.5–16)
POTASSIUM SERPL-SCNC: 4.4 MMOL/L (ref 3.5–5.1)
SODIUM SERPL-SCNC: 139 MMOL/L (ref 136–145)

## 2018-04-10 PROCEDURE — 94640 AIRWAY INHALATION TREATMENT: CPT

## 2018-04-10 PROCEDURE — 80048 BASIC METABOLIC PNL TOTAL CA: CPT | Performed by: PHYSICIAN ASSISTANT

## 2018-04-10 PROCEDURE — 97535 SELF CARE MNGMENT TRAINING: CPT

## 2018-04-10 PROCEDURE — 97161 PT EVAL LOW COMPLEX 20 MIN: CPT | Performed by: PHYSICAL THERAPIST

## 2018-04-10 PROCEDURE — 85018 HEMOGLOBIN: CPT | Performed by: PHYSICIAN ASSISTANT

## 2018-04-10 PROCEDURE — 74011000250 HC RX REV CODE- 250: Performed by: PHYSICIAN ASSISTANT

## 2018-04-10 PROCEDURE — 74011250637 HC RX REV CODE- 250/637: Performed by: PHYSICIAN ASSISTANT

## 2018-04-10 PROCEDURE — 97110 THERAPEUTIC EXERCISES: CPT | Performed by: PHYSICAL THERAPIST

## 2018-04-10 PROCEDURE — 97165 OT EVAL LOW COMPLEX 30 MIN: CPT

## 2018-04-10 PROCEDURE — 77010033678 HC OXYGEN DAILY

## 2018-04-10 PROCEDURE — 36415 COLL VENOUS BLD VENIPUNCTURE: CPT | Performed by: PHYSICIAN ASSISTANT

## 2018-04-10 PROCEDURE — 97116 GAIT TRAINING THERAPY: CPT | Performed by: PHYSICAL THERAPIST

## 2018-04-10 RX ORDER — ASPIRIN 325 MG
325 TABLET, DELAYED RELEASE (ENTERIC COATED) ORAL 2 TIMES DAILY
Qty: 1 TAB | Refills: 0 | Status: SHIPPED | OUTPATIENT
Start: 2018-04-10 | End: 2018-08-28

## 2018-04-10 RX ORDER — OXYCODONE HYDROCHLORIDE 5 MG/1
5-10 TABLET ORAL
Qty: 80 TAB | Refills: 0 | Status: SHIPPED | OUTPATIENT
Start: 2018-04-10 | End: 2018-08-28

## 2018-04-10 RX ADMIN — STANDARDIZED SENNA CONCENTRATE AND DOCUSATE SODIUM 1 TABLET: 8.6; 5 TABLET, FILM COATED ORAL at 10:05

## 2018-04-10 RX ADMIN — LEVOTHYROXINE SODIUM 100 MCG: 100 TABLET ORAL at 06:41

## 2018-04-10 RX ADMIN — FAMOTIDINE 20 MG: 20 TABLET, FILM COATED ORAL at 07:49

## 2018-04-10 RX ADMIN — CELECOXIB 200 MG: 200 CAPSULE ORAL at 17:02

## 2018-04-10 RX ADMIN — ASPIRIN 325 MG: 325 TABLET, DELAYED RELEASE ORAL at 17:03

## 2018-04-10 RX ADMIN — Medication 10 ML: at 14:16

## 2018-04-10 RX ADMIN — ACETAMINOPHEN 1000 MG: 500 TABLET, FILM COATED ORAL at 06:41

## 2018-04-10 RX ADMIN — POLYETHYLENE GLYCOL 3350 17 G: 17 POWDER, FOR SOLUTION ORAL at 10:04

## 2018-04-10 RX ADMIN — BUDESONIDE 500 MCG: 0.5 INHALANT RESPIRATORY (INHALATION) at 10:23

## 2018-04-10 RX ADMIN — CELECOXIB 200 MG: 200 CAPSULE ORAL at 10:05

## 2018-04-10 RX ADMIN — ARFORMOTEROL TARTRATE 15 MCG: 15 SOLUTION RESPIRATORY (INHALATION) at 10:24

## 2018-04-10 RX ADMIN — Medication 10 ML: at 06:42

## 2018-04-10 RX ADMIN — ACETAMINOPHEN 1000 MG: 500 TABLET, FILM COATED ORAL at 14:15

## 2018-04-10 RX ADMIN — OXYBUTYNIN CHLORIDE 5 MG: 5 TABLET, EXTENDED RELEASE ORAL at 10:06

## 2018-04-10 RX ADMIN — LORATADINE 10 MG: 10 TABLET ORAL at 06:41

## 2018-04-10 RX ADMIN — IPRATROPIUM BROMIDE 0.5 MG: 0.5 SOLUTION RESPIRATORY (INHALATION) at 10:23

## 2018-04-10 RX ADMIN — FAMOTIDINE 20 MG: 20 TABLET, FILM COATED ORAL at 17:02

## 2018-04-10 RX ADMIN — STANDARDIZED SENNA CONCENTRATE AND DOCUSATE SODIUM 1 TABLET: 8.6; 5 TABLET, FILM COATED ORAL at 17:02

## 2018-04-10 RX ADMIN — MONTELUKAST SODIUM 10 MG: 10 TABLET, FILM COATED ORAL at 10:05

## 2018-04-10 RX ADMIN — ASPIRIN 325 MG: 325 TABLET, DELAYED RELEASE ORAL at 10:05

## 2018-04-10 RX ADMIN — IPRATROPIUM BROMIDE 0.5 MG: 0.5 SOLUTION RESPIRATORY (INHALATION) at 14:11

## 2018-04-10 RX ADMIN — TRAMADOL HYDROCHLORIDE 50 MG: 50 TABLET, FILM COATED ORAL at 17:02

## 2018-04-10 NOTE — PROGRESS NOTES
Occupational Therapy EVALUATION/discharge  Patient: Chris Mullen (35 y.o. female)  Date: 4/10/2018  Primary Diagnosis: OA RIGHT HIP  Osteoarthritis of right hip  Procedure(s) (LRB):  RIGHT TOTAL HIP ARTHROPLASTY  ANTERIOR APPROACH (Right) 1 Day Post-Op   Precautions: Fall, WBAT RLE    ASSESSMENT:   Based on the objective data described below, the patient presents with overall independence for upper body ADLs, Min A for lower body ADLs, and supervision for functional mobility with RW POD #1 s/p R ISATU. PTA, patient was Mod I-independent with ADLs and IADLs, reporting use of compensatory mobility strategies d/t RLE pain. Patient received in supine, agreeable to therapy. In preparation for d/c, patient completing toileting with supervision, using RW and grab bars for stability. Patient able to independently dress upper body, requiring Min A for lower body dressing of distal RLE d/t pain with increased hip flexion in attempt to doff/don socks. Educated on home safety and ADL modifications for home, handout provided, with patient and  indicating understanding. Patient will have family assist upon d/c, and therefore has no further acute OT needs as she is almost at baseline (will get family assist PRN for LB dressing). Further skilled acute occupational therapy is not indicated at this time. Discharge Recommendations: Home with family support  Further Equipment Recommendations for Discharge: None needed--patient has all DME/AE necessary      SUBJECTIVE:   Patient stated You know I was doing some of these before I had surgery because the pain was so bad. Benton Neves [re: compensatory strategies for ADLs]    OBJECTIVE DATA SUMMARY:   HISTORY:   Past Medical History:   Diagnosis Date    Acid reflux     Acoustic neuroma ([facility] [address] 05/2012    GAMMA KNIFE DR Duncan Maravilla    Asthma     LAST EXACERBATION 2015    Bilateral hearing loss 3/19/2018    Chronic obstructive pulmonary disease (HCC)     Chronic pain     left  leg    Hypothyroid     Other ill-defined conditions(799.89)     cavinous sinus thrombosis    Shingles 2014    Stroke [facility]) 08/2012    CAVERNOUS SINUS THROMBOSIS      Past Surgical History:   Procedure Laterality Date    HC SLNG OBTURATOR SYS TVT GYNECA  2008    HX CATARACT REMOVAL  2014    HX CERVICAL FUSION  2011    C5-7    HX CHOLECYSTECTOMY  2010    HX COLONOSCOPY  2016    HX HEART CATHETERIZATION  2016    NEGATIVE PER PATIENT    HX HEENT Left 2017    BONE INDUCTION FOR HEARING AID    HX HYSTERECTOMY  1976    HX LUMBAR FUSION  2014    HX ORTHOPAEDIC  2006    TRIGGER FINGER    HX OTHER SURGICAL  2012    GAMMA KNIFE RADIATION    HX REFRACTIVE SURGERY  2003    HX TONSILLECTOMY  1948    [place] COMBINED ANT/POST COLPORRHAPHY  2008    [place] REMOVAL OF ANAL FISSURE  1965       Prior Level of Function/Environment/Context: PTA patient was independent-Mod I with ADLs/IADLs, living with  in a 2 story house. Main bed, bath, and laundry are on the main level.  will be able to provide assist at times during the day, and daughter is coming to stay with her until she progresses. Home Situation  Home Environment: Private residence  # Steps to Enter: 4  Rails to Enter: Yes  Hand Rails : Bilateral  One/Two Story Residence: Two story, live on 1st floor  Lift Chair Available: No  Living Alone: No  Support Systems: Spouse/Significant Other/Partner  Patient Expects to be Discharged to[de-identified] Private residence  Current DME Used/Available at Home: Adaptive dressing aides, Grab bars (reacher and long handled bath sponge)  Tub or Shower Type: Tub/Shower combination  [x]  Right hand dominant   []  Left hand dominant    EXAMINATION OF PERFORMANCE DEFICITS:  Cognitive/Behavioral Status:  Neurologic State: Alert  Orientation Level: Oriented X4  Cognition: Appropriate for age attention/concentration; Follows commands  Perception: Appears intact  Perseveration: No perseveration noted  Safety/Judgement: Awareness of environment; Fall prevention; Insight into deficits    Skin: Appears intact    Edema: None noted    Hearing: Auditory  Auditory Impairment: None    Vision/Perceptual:    Tracking: Able to track stimulus in all quadrants w/o difficulty       Acuity: Within Defined Limits         Range of Motion:  In BUEs  AROM: Within functional limits  PROM: Within functional limits     Strength: In BUEs  Strength: Generally decreased, functional     Coordination:  Coordination: Within functional limits  Fine Motor Skills-Upper: Left Intact; Right Intact    Gross Motor Skills-Upper: Left Intact; Right Intact    Tone & Sensation:  In BUEs  Tone: Normal  Sensation: Intact     Balance:  Sitting: Intact; Without support  Standing: Intact; With support (RW)    Functional Mobility and Transfers for ADLs:  Bed Mobility:  Supine to Sit: Supervision  Scooting: Supervision    Transfers:  Sit to Stand: Supervision  Stand to Sit: Supervision  Toilet Transfer : Supervision; Adaptive equipment (grab bars)    ADL Assessment:  Feeding: Independent*    Oral Facial Hygiene/Grooming: Independent*    Bathing: Additional time;Minimum assistance (for distal RLE bathing)*    Upper Body Dressing: Independent    Lower Body Dressing: Minimum assistance (for donning L sock)    Toileting: Supervision   *Inferred per BUE ROM, functional mobility, functional reach, RLE pain, and activity tolerance    ADL Intervention and task modifications:     Upper Body Dressing Assistance  Dressing Assistance: Independent  Pullover Shirt: Independent    Lower Body Dressing Assistance  Dressing Assistance: Minimum assistance  Underpants: Supervision/set-up; Compensatory technique training  Pants With Elastic Waist: Supervision/set-up; Compensatory technique training  Socks: Minimum assistance (donning R sock)  Shoes with Elastic Laces: Supervision/set-up; Compensatory technique training (with elastic laces)  Leg Crossed Method Used: No  Position Performed: Seated in chair;Standing  Cues: Physical assistance;Verbal cues provided    Toileting  Toileting Assistance: Supervision/set up  Bladder Hygiene: Supervision/set-up  Bowel Hygiene: Supervision/set-up  Clothing Management: Supervision/set-up  Adaptive Equipment: Grab bars; Walker    Cognitive Retraining  Safety/Judgement: Awareness of environment; Fall prevention; Insight into deficits    Educated patient on energy conservation techniques, strategies to maximize quality of life and decrease stress/anxiety. Handout provided. 1. Deep breathing  2. Educated on pacing and making sure he/she takes short frequent breaks (e.g. In the shower wash the upper body, rest for 1 minute, then wash the lower body, etc)  3. Educated on using cooler water in the shower so as to not get fatigued from the heat  4. Educated on drying off by using a kyler cloth robe  5. Educated on re-arranging his/her routine to allow for rest breaks in the morning routine  6. Educated on using a mantra and medication to decrease feelings of anxiety, especially when short of breath  7. Educated on looking at the consequences of his/her actions before deciding he/she needs to take on a task (e.g not getting down on one's hands and knees to wash floors because it will take all of one's energy for the day and result in exhaustion). 8. Educated on DME used to help conserve energy, such as a shower seat, a stool or chair in the kitchen, and pushing or pulling items instead of carrying them. Bathing: Patient instructed when bathing to not submerge wound in water, stand to shower or sponge bathe, cover wound with plastic and tape to ensure no water reaches bandage/wound without cues. Patient indicated understanding. Dressing joint: Patient instructed and demonstrated to don/doff RLE first/last with verbal cues.   Patient instructed and demonstrated to don all clothing while sitting prior to standing, doff all clothing to knees while standing, then sit to doff clothing off from knees to feet in order to facilitate fall prevention, pain management, and energy conservation with Supervision. Home safety: Patient instructed on home modifications and safety (raise height of ADL objects, appropriate height of chair surfaces, recliner safety, change of floor surfaces, clear pathways) to increase independence and fall prevention. Patient indicated understanding. Standing: Patient instructed and demonstrated to walk up to sink/counter top/surfaces, step into walker to increase safety of joint and fall prevention with Supervision. Patient educated about hip anatomy verbally and with pictures and educated to avoid internal rotation of RLE. Instructed to apply concept to ADLs within the home (no reaching across body to L side, square off while using objects, slide objects along surfaces). Patient instructed to increase amount of time standing, observe standing position during ADLs in order to increase even weight bearing through bilateral LEs in order to increase independence with ADLs. Goal to be reached 30 days post - op, per orthopedic surgeon or per PT. Patient indicated understanding. Tub transfer: Patient instructed regarding when it is safe to begin transfer into tub (complete stairs with PT, advance exercises with PT high enough to clear tub height). Patient instructed to use the same technique as used with stairs when entering and exiting tub (\"up with the non-surgical, down with the surgical leg\"). Patient indicated understanding. Therapeutic Exercise:  Patient ambulated to/from bathroom with supervision and RW.  Functional sit<>stands completed throughout ADLs, supervision with RW (and grab bars for toilet transfer)     Functional Measure:  Barthel Index:    Bathin  Bladder: 10  Bowels: 10  Groomin  Dressin  Feeding: 10  Mobility: 10  Stairs: 0  Toilet Use: 10  Transfer (Bed to Chair and Back): 10  Total: 70       Barthel and G-code impairment scale:  Percentage of impairment CH  0% CI  1-19% CJ  20-39% CK  40-59% CL  60-79% CM  80-99% CN  100%   Barthel Score 0-100 100 99-80 79-60 59-40 20-39 1-19   0   Barthel Score 0-20 20 17-19 13-16 9-12 5-8 1-4 0      The Barthel ADL Index: Guidelines  1. The index should be used as a record of what a patient does, not as a record of what a patient could do. 2. The main aim is to establish degree of independence from any help, physical or verbal, however minor and for whatever reason. 3. The need for supervision renders the patient not independent. 4. A patient's performance should be established using the best available evidence. Asking the patient, friends/relatives and nurses are the usual sources, but direct observation and common sense are also important. However direct testing is not needed. 5. Usually the patient's performance over the preceding 24-48 hours is important, but occasionally longer periods will be relevant. 6. Middle categories imply that the patient supplies over 50 per cent of the effort. 7. Use of aids to be independent is allowed. Joshua Turpin., Barthel, DAshuW. ([id number]). Functional evaluation: the Barthel Index. [address] [facility] (14)2. Domenico Adams luther Kade, DALIAJAshuMEDDIE, Amrit Cash., Lenord Shone., Angel, [address] [place] [address] (1999). Measuring the change indisability after inpatient rehabilitation; comparison of the responsiveness of the Barthel Index and Functional [place] Measure. Journal of Neurology, Neurosurgery, and Psychiatry, 66(4), 518-468. Georgiana Garber, N.J.A, JAI Baumann, & Niall Russo MFOUZIA. (2004.) Assessment of post-stroke quality of life in cost-effectiveness studies: The usefulness of the Barthel Index and the EuroQoL-5D. Quality of Life Research, 13, 164-53         G codes: In compliance with CMSs Claims Based Outcome Reporting, the following G-code set was chosen for this patient based on their primary functional limitation being treated:     The outcome measure chosen to determine the severity of the functional limitation was the Barthel Index with a score of 70/100 which was correlated with the impairment scale. ? Self Care:     - CURRENT STATUS: CJ - 20%-39% impaired, limited or restricted    - GOAL STATUS: CJ - 20%-39% impaired, limited or restricted    - D/C STATUS:  CJ - 20%-39% impaired, limited or restricted     Occupational Therapy Evaluation Charge Determination   History Examination Decision-Making   LOW Complexity : Brief history review  LOW Complexity : 1-3 performance deficits relating to physical, cognitive , or psychosocial skils that result in activity limitations and / or participation restrictions  LOW Complexity : No comorbidities that affect functional and no verbal or physical assistance needed to complete eval tasks       Based on the above components, the patient evaluation is determined to be of the following complexity level: LOW   Pain:  Pain Scale 1: Numeric (0 - 10)  Pain Intensity 1: 0     Activity Tolerance:   Good. Please refer to the flowsheet for vital signs taken during this treatment. After treatment:   [x]  Patient left in no apparent distress sitting up in chair  []  Patient left in no apparent distress in bed  [x]  Call bell left within reach  [x]  Nursing notified  [x]  Caregiver present  []  Bed alarm activated    COMMUNICATION/EDUCATION:   Communication/Collaboration:  [x]      Home safety education was provided and the patient/caregiver indicated understanding. [x]      Patient/family have participated as able and agree with findings and recommendations. []      Patient is unable to participate in plan of care at this time.   Findings and recommendations were discussed with: Physical Therapist and Registered Nurse    Herminia Burkitt, OT  Time Calculation: 37 mins

## 2018-04-10 NOTE — PROGRESS NOTES
Care Management Interventions  PCP Verified by CM: Yes (Dr. Pb Ruiz)  Palliative Care Criteria Met (RRAT>21 & CHF Dx)?: No  Mode of Transport at Discharge: Other (see comment) (family)  Transition of Care Consult (CM Consult): Home Health, Discharge Planning  [facility] [address] [address]: No  Reason Outside [place]: Out of service area, Patient already serviced by other home care/hospice agency (Patient prefers [facility])  MyChart Signup: No  Discharge Durable Medical Equipment: No (Patient owns walker and bedside commode)  Health Maintenance Reviewed: Yes  Physical Therapy Consult: Yes  Occupational Therapy Consult: Yes  Speech Therapy Consult: No  Current Support Network: Lives with Spouse, Own Home  Confirm Follow Up Transport: Family  Plan discussed with Pt/Family/Caregiver: Yes  Freedom of Choice Offered: Yes  Discharge Location  Discharge Placement: Home with home health     Reason for Admission:   RIGHT TOTAL HIP ARTHROPLASTY  ANTERIOR APPROACH          RRAT Score:  14     Do you (patient/family) have any concerns for transition/discharge? None     Plan for utilizing home health: patient prefers [facility]. Referral sent via [employer]. CM called [facility], they do not service [place] but their sister agency, [address] [place] [address] Dr. bello. CM faxed referral to [facility] at [phone]. CM received call from [facility] with [facility], they can accept pt. CM updated AVS.  Likelihood of readmission? Moderate. Transition of Care Plan: Discharge home with home health and family assistance when medically stable for discharge.     Evelio Gil, BSW/CRM

## 2018-04-10 NOTE — PROGRESS NOTES
Bedside and Verbal shift change report given to Za Black RN (oncoming nurse) by Johanna Powell RN (offgoing nurse). Report included the following information SBAR, Kardex, OR Summary, Procedure Summary, Intake/Output, MAR and Recent Results.

## 2018-04-10 NOTE — DISCHARGE INSTRUCTIONS
Discharge Instructions Anterior Approach Hip Replacement  Dr. Diane Valencia    Patient Lavinia Doss  Date of procedure:4/9/2018    Procedure:Procedure(s):  RIGHT TOTAL HIP ARTHROPLASTY  ANTERIOR APPROACH  Surgeon:Surgeon(s) and Role:     * Kirit Almodovar MD - Primary   PCP: Edmundo Shipley MD  Date of discharge: No discharge date for patient encounter. Follow up appointments   Follow up with Dr. Diane Valencia in 3 weeks. Call [phone] to make an appointment.  If home health has been arranged for you the agency will contact you to arrange dates/times for visits. Please call them if you do not hear from them within 24 hours after you are discharged    When to call your Orthopaedic Surgeon: Call [phone]. If you need to reach us after 5pm or on a weekend; call [phone] and the on call physician will be contacted   Increased hip pain, unrelieved pain or if you have difficulty or are unable to walk   Signs of infection-if your incision is red; continues to have drainage; drainage has a foul odor or if you have a persistent fever over 101 degrees   Signs of a blood clot in your leg-calf pain, tenderness, redness, swelling of lower leg    When to call your Primary Care Physician:   Concerns about medical conditions such as diabetes, high blood pressure, asthma, congestive heart failure   Call if blood sugars are elevated, persistent headache or dizziness, coughing or congestion, constipation or diarrhea, burning with urination, abnormal heart rate    When to call [id number] go to the nearest emergency room   Acute onset of chest pain, shortness of breath, difficulty breathing    Activity   Weight bearing as tolerated with walker or crutches.  Refer to pages 23-33 of your handbook for instructions and pictures   Complete your Home Exercise Program daily as instructed by your therapist.  Refer to pages 36-42 of your handbook for instructions and pictures   Get up every one hour and walk (except at night when sleeping)   AVOID sudden and extreme movement of your hip (surgical leg)   Do not drive or operate heavy machinery    Incision Care     The Aquacel (brown, waterproof) surgical dressing is to remain on your hip for 7 days. On the 7th day have someone gently peel the dressing off by carefully lifting the edge and stretching it slightly to break the adhesive seal and leave incision open to air. You may take a shower with the Aquacel dressing in place.  You do not have staples in your hip incision; if present they will be removed by the Home Health staff in 10-14 days and steri-strips will be applied. Leave the steri-strips on until they fall off    Preventing blood clot   Take Aspirin as prescribed    Pain management   Take pain medication as prescribed; decrease the amount you use as your pain lessens   Avoid alcoholic beverages while taking pain medication   Do not take any over-the-counter medication except for Tylenol (acetaminophen)   Please be aware that many medications contain Tylenol. We do not want you to over medicate so please read the information below as a guide. Do not take more than 3 Grams of Tylenol in a 24 hour period. (There are 1000 milligrams in one Gram)  o 325 mg of Tylenol per tablet (do not take more than 9 tablets in 24 hours)  o 500 mg of Tylenol per tablet (do not take more than 6 tablets in 24 hours)   You may place an ice bag on your hip for 15-20 minutes after exercising and as needed throughout the day and night    Diet  Resume usual diet; drink plenty of fluids; eat foods high in fiber  You may want to take a stool softener (such as Senokot-S or Colace) to prevent constipation while you are taking pain medication.   If constipation occurs, take a laxative (such as Dulcolax tablets, Milk of Magnesia, or a suppository)      Patient meets criteria for   BUNDLED PAYMENT   for Care Improvement Initiative Criteria    Contact Information for Orthopedic Nurse Navigator:      VALERY Ruiz, RN-BC  K:[phone]  [phone]

## 2018-04-10 NOTE — PROGRESS NOTES
Problem: Mobility Impaired (Adult and Pediatric)  Goal: *Acute Goals and Plan of Care (Insert Text)  Physical Therapy Goals  Initiated 4/10/2018    1. Patient will move from supine to sit and sit to supine  in bed with modified independence within 4 days. 2. Patient will perform sit to stand with modified independence within 4 days. 3. Patient will ambulate with modified independence for 200 feet with the least restrictive device within 4 days. 4. Patient will ascend/descend 4 stairs with B handrail(s) with modified independence within 4 days. 5. Patient will verbalize and demonstrate understanding of THR precautions per protocol within 4 days. 6. Patient will perform THR home exercise program per protocol with modified independence within 4 days. physical Therapy EVALUATION  Patient: Bianca Jerome (44 y.o. female)  Date: 4/10/2018  Primary Diagnosis: OA RIGHT HIP  Osteoarthritis of right hip  Procedure(s) (LRB):  RIGHT TOTAL HIP ARTHROPLASTY  ANTERIOR APPROACH (Right) 1 Day Post-Op   Precautions:   Fall, WBAT    ASSESSMENT :  Based on the objective data described below, the patient presents with decreased functional mobility from baseline level of function. Prior to admit patient was independent with mobility and living with  in a 2 level home but stays on the 1st floor. Patient currently needing CGA for bed mobility and CGA for transfers. Patient BP stable throughout session. Amb approx 120 feet with RW and CGA with slow elizabeth and able to progress to step-through pattern. Patient positioned up in chair with needs in reach and  present. Will plan to see this PM for stair training and mobility progression. Anticipate patient will clear this PM for DC. Patient will benefit from skilled intervention to address the above impairments.   Patients rehabilitation potential is considered to be Good  Factors which may influence rehabilitation potential include:   [x]         None noted  [] Mental ability/status  []         Medical condition  []         Home/family situation and support systems  []         Safety awareness  []         Pain tolerance/management  []         Other:      PLAN :  Recommendations and Planned Interventions:  [x]           Bed Mobility Training             []    Neuromuscular Re-Education  [x]           Transfer Training                   []    Orthotic/Prosthetic Training  [x]           Gait Training                         []    Modalities  [x]           Therapeutic Exercises           []    Edema Management/Control  [x]           Therapeutic Activities            [x]    Patient and Family Training/Education  []           Other (comment):    Frequency/Duration: Patient will be followed by physical therapy  twice daily to address goals. Discharge Recommendations: Home Health  Further Equipment Recommendations for Discharge: none     SUBJECTIVE:   Patient stated I can't believe how well I am doing.     OBJECTIVE DATA SUMMARY:   HISTORY:    Past Medical History:   Diagnosis Date    Acid reflux     Acoustic neuroma ([facility] 05/2012    GAMMA KNIFE DR Maddox Dexter    Asthma     LAST EXACERBATION 2015    Bilateral hearing loss 3/19/2018    Chronic obstructive pulmonary disease (HCC)     Chronic pain     left  leg    Hypothyroid     Other ill-defined conditions(799.89)     cavinous sinus thrombosis    Shingles 2014    Stroke ([facility] 08/2012    CAVERNOUS SINUS THROMBOSIS      Past Surgical History:   Procedure Laterality Date    HC SLNG OBTURATOR SYS TVT GYNECA  2008    HX CATARACT REMOVAL  2014    HX CERVICAL FUSION  2011    C5-7    HX CHOLECYSTECTOMY  2010    HX COLONOSCOPY  2016    HX HEART CATHETERIZATION  2016    NEGATIVE PER PATIENT    HX HEENT Left 2017    BONE INDUCTION FOR HEARING AID    HX HYSTERECTOMY  1976    HX LUMBAR FUSION  2014    HX ORTHOPAEDIC  2006    TRIGGER FINGER    HX OTHER SURGICAL  2012    GAMMA KNIFE RADIATION    HX REFRACTIVE SURGERY 2003    HX TONSILLECTOMY  1948    FL COMBINED ANT/POST COLPORRHAPHY  2008    FL REMOVAL OF ANAL FISSURE  1965     Prior Level of Function/Home Situation: Independent with functional mobility at baseline. Has some balance impairment secondary to hearing loss/neuroma in L ear   Personal factors and/or comorbidities impacting plan of care:     Home Situation  Home Environment: Private residence  # Steps to Enter: 4  Rails to Enter: Yes  Hand Rails : Bilateral  One/Two Story Residence: Two story, live on 1st floor  Lift Chair Available: No  Living Alone: No  Support Systems: Spouse/Significant Other/Partner  Patient Expects to be Discharged to[de-identified] Private residence  Current DME Used/Available at Home: Gwendolynn Ramsey, Raised toilet seat, Cane, straight    EXAMINATION/PRESENTATION/DECISION MAKING:   Critical Behavior:  Neurologic State: Alert, Appropriate for age  Orientation Level: Appropriate for age, Oriented X4  Cognition: Follows commands, Appropriate for age attention/concentration     Hearing: Auditory  Auditory Impairment: None    Range Of Motion:  AROM: Generally decreased, functional                       Strength:    Strength: Generally decreased, functional     Functional Mobility:  Bed Mobility:     Supine to Sit: Supervision; Additional time     Scooting: Supervision  Transfers:  Sit to Stand: Contact guard assistance  Stand to Sit: Contact guard assistance                       Balance:   Sitting: Intact  Standing: Intact; With support  Ambulation/Gait Training:  Distance (ft): 120 Feet (ft)  Assistive Device: Gait belt;Walker, rolling  Ambulation - Level of Assistance: Contact guard assistance        Gait Abnormalities: Antalgic;Decreased step clearance; Step to gait  Right Side Weight Bearing: As tolerated     Base of Support: Widened  Stance: Right decreased  Speed/Kelsey: Pace decreased (<100 feet/min); Slow  Step Length: Left shortened;Right shortened       Functional Measure:  Barthel Index:    Bathing: 0  Bladder: 10  Bowels: 10  Groomin  Dressin  Feeding: 10  Mobility: 10  Stairs: 0  Toilet Use: 5  Transfer (Bed to Chair and Back): 10  Total: 65       Barthel and G-code impairment scale:  Percentage of impairment CH  0% CI  1-19% CJ  20-39% CK  40-59% CL  60-79% CM  80-99% CN  100%   Barthel Score 0-100 100 99-80 79-60 59-40 20-39 1-19   0   Barthel Score 0-20 20 17-19 13-16 9-12 5-8 1-4 0      The Barthel ADL Index: Guidelines  1. The index should be used as a record of what a patient does, not as a record of what a patient could do. 2. The main aim is to establish degree of independence from any help, physical or verbal, however minor and for whatever reason. 3. The need for supervision renders the patient not independent. 4. A patient's performance should be established using the best available evidence. Asking the patient, friends/relatives and nurses are the usual sources, but direct observation and common sense are also important. However direct testing is not needed. 5. Usually the patient's performance over the preceding 24-48 hours is important, but occasionally longer periods will be relevant. 6. Middle categories imply that the patient supplies over 50 per cent of the effort. 7. Use of aids to be independent is allowed. Curtis Barnard., Barthel, DAshuW. (5853). Functional evaluation: the Barthel Index. [facility] (14)2. ELAINE Puckett, Antonio Quarles., Paulie Ordonez., Lilly, [address] [place] [address] (). Measuring the change indisability after inpatient rehabilitation; comparison of the responsiveness of the Barthel Index and Functional [address] Measure. Journal of Neurology, Neurosurgery, and Psychiatry, 66(4), 445-784. Svetlana Woods, N.J.A, LACIE BaumannJ.SVETLANA, & Vee Walker MAshuA. (2004.) Assessment of post-stroke quality of life in cost-effectiveness studies: The usefulness of the Barthel Index and the EuroQoL-5D. Quality of Life Research, 13, 632-37         G codes:   In compliance with CMSs Claims Based Outcome Reporting, the following G-code set was chosen for this patient based on their primary functional limitation being treated: The outcome measure chosen to determine the severity of the functional limitation was the Barthel with a score of 65/100 which was correlated with the impairment scale. ? Mobility - Walking and Moving Around:     - CURRENT STATUS: CJ - 20%-39% impaired, limited or restricted    - GOAL STATUS: CI - 1%-19% impaired, limited or restricted    - D/C STATUS:  ---------------To be determined---------------          Pain:  Pain Scale 1: Numeric (0 - 10)  Pain Intensity 1: 0              Activity Tolerance:   VSS  Please refer to the flowsheet for vital signs taken during this treatment. After treatment:   [x]         Patient left in no apparent distress sitting up in chair  []         Patient left in no apparent distress in bed  [x]         Call bell left within reach  [x]         Nursing notified  [x]         Caregiver present  []         Bed alarm activated    COMMUNICATION/EDUCATION:   The patients plan of care was discussed with: Physical Therapist, Occupational Therapist and Registered Nurse. [x]         Fall prevention education was provided and the patient/caregiver indicated understanding. [x]         Patient/family have participated as able in goal setting and plan of care. [x]         Patient/family agree to work toward stated goals and plan of care. []         Patient understands intent and goals of therapy, but is neutral about his/her participation. []         Patient is unable to participate in goal setting and plan of care.     Thank you for this referral.  Mark Vick, PT, DPT   Time Calculation: 20 mins

## 2018-04-10 NOTE — PROGRESS NOTES
I have reviewed discharge instructions with the patient and spouse. The patient and spouse verbalized understanding. The patient was taken to patient discharge in a wheelchair by volunteer.

## 2018-04-10 NOTE — PROGRESS NOTES
Problem: Mobility Impaired (Adult and Pediatric)  Goal: *Acute Goals and Plan of Care (Insert Text)  Physical Therapy Goals  Initiated 4/10/2018    1. Patient will move from supine to sit and sit to supine  in bed with modified independence within 4 days. 2. Patient will perform sit to stand with modified independence within 4 days. 3. Patient will ambulate with modified independence for 200 feet with the least restrictive device within 4 days. 4. Patient will ascend/descend 4 stairs with B handrail(s) with modified independence within 4 days. 5. Patient will verbalize and demonstrate understanding of THR precautions per protocol within 4 days. 6. Patient will perform THR home exercise program per protocol with modified independence within 4 days. physical Therapy TREATMENT  Patient: Anel Edwards (31 y.o. female)  Date: 4/10/2018  Diagnosis: OA RIGHT HIP  Osteoarthritis of right hip <principal problem not specified>  Procedure(s) (LRB):  RIGHT TOTAL HIP ARTHROPLASTY  ANTERIOR APPROACH (Right) 1 Day Post-Op  Precautions: Fall, WBAT  Chart, physical therapy assessment, plan of care and goals were reviewed. ASSESSMENT:  Patient making steady progress toward goals. Patient received up in chair and agreeable to PT. Sit to stand with CGA-SBA. Patient amb approx 150 feet with RW and CGA with slow elizabeth and decreased step clearance. Improved pain after ambulation. Amb approx 150 feet with RW and CGA with decreased step clearance but able to do step through pattern. Up/down 4 stairs with rail and CGA. Patient returned to room and instructed in HEP. Patient is cleared to DC from a mobility standpoint and  will assist her at home. Will continue to follow should DC be delayed.   Progression toward goals:  [x]      Improving appropriately and progressing toward goals  []      Improving slowly and progressing toward goals  []      Not making progress toward goals and plan of care will be adjusted PLAN:  Patient continues to benefit from skilled intervention to address the above impairments. Continue treatment per established plan of care. Discharge Recommendations:  Home Health  Further Equipment Recommendations for Discharge:  TBD     SUBJECTIVE:   Patient stated I'm doing better now that I moved.     OBJECTIVE DATA SUMMARY:   Critical Behavior:  Neurologic State: Alert  Orientation Level: Oriented X4  Cognition: Appropriate for age attention/concentration, Follows commands  Safety/Judgement: Awareness of environment, Fall prevention, Insight into deficits  Range of Motion:  AROM: Within functional limits  PROM: Within functional limits                    Functional Mobility Training:  Bed Mobility:     Supine to Sit: Supervision     Scooting: Supervision        Transfers:  Sit to Stand: Supervision  Stand to Sit: Supervision                             Balance:  Sitting: Intact; Without support  Standing: Intact; With support  Ambulation/Gait Training:  Distance (ft): 150 Feet (ft)  Assistive Device: Gait belt;Walker, rolling  Ambulation - Level of Assistance: Contact guard assistance        Gait Abnormalities: Antalgic;Decreased step clearance; Step to gait  Right Side Weight Bearing: As tolerated     Base of Support: Widened  Stance: Right decreased  Speed/Kelsey: Pace decreased (<100 feet/min); Slow  Step Length: Left shortened;Right shortened       Stairs:  Number of Stairs Trained: 4  Stairs - Level of Assistance: Contact guard assistance  Rail Use: Right  (SPC in L UE)  Therapeutic Exercises:     EXERCISE   Sets   Reps   Active Active Assist   Passive Self ROM   Comments   Ankle Pumps 1 10 []                                        []                                        []                                        []                                           Quad Sets 1 10 []                                        []                                        [] []                                           Hamstring Sets   []                                        []                                        []                                        []                                           Short Arc Quads   []                                        []                                        []                                        []                                           Knee Extension Stretch     []                                          []                                          []                                          []                                           Heel Slides 1 10 []                                        []                                        []                                        []                                           Long Arc Quads   []                                        []                                        []                                        []                                           Knee Flexion Stretch   []                                        []                                        []                                        []                                           Straight Leg Raises   []                                        []                                        []                                        []                                             Pain:  Pain Scale 1: Numeric (0 - 10)  Pain Intensity 1: 0              Activity Tolerance:   VSS  Please refer to the flowsheet for vital signs taken during this treatment.   After treatment:   [] Patient left in no apparent distress sitting up in chair  [x] Patient left in no apparent distress in bed  [x] Call bell left within reach  [x] Nursing notified  [x] Caregiver present  [] Bed alarm activated    COMMUNICATION/COLLABORATION:   The patients plan of care was discussed with: Physical Therapist, Occupational Therapist and Registered Nurse    Raiza Shaikh Francoise Villa, PT, DPT   Time Calculation: 23 mins

## 2018-04-10 NOTE — DISCHARGE SUMMARY
[address] [place] [address]   [address] [facility] [address]    DISCHARGE SUMMARY     Patient: [facility] Record Number: [id number]                : 1940  Age: 78 y.o. Admit Date: 2018  Discharge Date:   Admission Diagnosis: OA RIGHT HIP  Osteoarthritis of right hip  Discharge Diagnosis: OA RIGHT HIP  Procedures: Procedure(s):  RIGHT TOTAL HIP ARTHROPLASTY  ANTERIOR APPROACH  Surgeon: Minta Nageotte  Assistants: Sage  Anesthesia: general  Complications: None     History of Present Illness:  Angeles David is a 78 y.o. female with a history of Right hip pain, swelling, and marked loss of function. Despite conservative management and after clinical and radiographic evaluation, it was determined that she suffered from end-stage osteoarthritis and would benefit from Procedure(s):  RIGHT TOTAL HIP ARTHROPLASTY  ANTERIOR APPROACH, which she consented to undergo after a discussion of the risks, benefits, alternatives, rehab concerns, and potential complications of surgery. Hospital Course:  Angeles Davdi tolerated the procedure well. She was transferred  to the recovery room in stable condition. After a brief stay the patient was then transferred to the Joint Replacement Unit at [address] [place] [address].  On postoperative day #1, the dressing was clean and dry, she was neurovascularly intact. The patient was afebrile and vital signs were stable. Calves were soft and non-tender bilaterally. On postoperative day  # 1, the patient was tolerating a regular diet and making satisfactory progress with physical therapy. Hemoglobin and INR prior to discharge were   Lab Results   Component Value Date/Time    HGB 9.0 (L) 04/10/2018 06:43 AM    INR 1.0 2018 09:45 AM   .  Angeles David made satisfactory progress with physical therapy and was discharged to Home in stable condition on postoperative day 1.   She was provided with routine postoperative instructions and advised to follow up in my office in 3 weeks following discharge from the hospital.  She was prescribed ASA for DVT prophylaxis and oxycodone for post-operative pain. Discharge Medications:  Current Discharge Medication List      START taking these medications    Details   aspirin delayed-release 325 mg tablet Take 1 Tab by mouth two (2) times a day. Qty: 1 Tab, Refills: 0      oxyCODONE IR (ROXICODONE) 5 mg immediate release tablet Take 1-2 Tabs by mouth every four (4) hours as needed. Max Daily Amount: 60 mg.  Qty: 80 Tab, Refills: 0    Associated Diagnoses: Primary osteoarthritis of right hip         CONTINUE these medications which have NOT CHANGED    Details   levothyroxine (SYNTHROID) 100 mcg tablet Take 100 mcg by mouth Daily (before breakfast). Calcium-Cholecalciferol, D3, 500 mg(1,250mg) -400 unit tab Take 1 Tab by mouth daily. montelukast (SINGULAIR) 10 mg tablet Take 10 mg by mouth daily. tolterodine ER (DETROL LA) 4 mg ER capsule Take 4 mg by mouth daily. raNITIdine (ZANTAC) 150 mg tablet Take 150 mg by mouth two (2) times a day. budesonide-formoterol (SYMBICORT) 80-4.5 mcg/actuation HFAA Take 2 Puffs by inhalation two (2) times a day. tiotropium (SPIRIVA WITH HANDIHALER) 18 mcg inhalation capsule Take 1 Cap by inhalation daily. MULTIVITAMIN PO Take 1 Tab by mouth daily. loratadine (CLARITIN) 10 mg tablet Take 10 mg by mouth as needed. albuterol (PROAIR HFA) 90 mcg/actuation inhaler Take 2 Puffs by inhalation every four (4) hours as needed for Wheezing.          STOP taking these medications       conjugated estrogens (PREMARIN) 0.625 mg/gram vaginal cream Comments:   Reason for Stopping:         trolamine salicylate (ASPERCREME) 10 % lotion Comments:   Reason for Stopping:         acetaminophen (TYLENOL ARTHRITIS PAIN) 650 mg TbER Comments:   Reason for Stopping:         aspirin 81 mg chewable tablet Comments:   Reason for Stopping: Cholecalciferol, Vitamin D3, (VITAMIN D) 1,000 unit Cap Comments:   Reason for Stopping:               Signed by: Denver Byes, PA  4/10/2018

## 2018-04-11 ENCOUNTER — PATIENT OUTREACH (OUTPATIENT)
Dept: OTHER | Age: 78
End: 2018-04-11

## 2018-04-11 NOTE — PROGRESS NOTES
This note will not be viewable in [address] Post Discharge Follow-up contact after Joint Replacement    Patient discharged on 4/10/18  By  Noah Puente   following  right hip Arthroplasty. Spoke with patient's  and  today, who reports they are \" doing well  - had a shower this morning and is resting now. \"  Denies Fever, Shortness of Breath or Chest Pain. Home Health has not visited; but is scheduled for first visit today. Patient also reports:. Incision  clean, dry, intact  Calf is non-tender,   operative extremity has moderate swelling. Pain is well managed. Discussed use of ice & elevation. The patient  is exercising independently. Taking Aspirin for anticoagulation, Tramadol for pain. Patient   is not experiencing symptoms of constipation & urinating without difficulty. Discussed side effects of anticoagulants & pain medications (bleeding/bruising, constipation, lightheaded/dizziness)  Follow up appointment is not scheduled; but patient states plan to schedule. Discussed calling surgeon Dr Stephany Browne  for drainage, bleeding, swelling in operative extremity, fever or pain. Discussed calling PCP Dr Maribell Alves with other medical issues.

## 2018-04-11 NOTE — PROGRESS NOTES
[address]  AR Bundled Payment Handoff     FROM:                                TO: [address]                                                      ([address] [place] [address] or Facility name)  Lluvia Perez 55  [address] [address]  Dept: [address]: [phone]                                      Room#:  555/01                                                      Discharging Nurse:  Tre Lopez RN  Unit XT#:[phone]         SITUATION      ASAScore: ASA 1 - Normal health patient    Admitted:  4/9/2018  Hospital Day: 2      Attending Provider:  No att. providers found     Consultations:  None    PCP:  Junita Opitz, MD   [phone]     Admitting Dx:  OA RIGHT HIP  Osteoarthritis of right hip       Active Problems:    Osteoarthritis of right hip (4/9/2018)      1 Day Post-Op of   Procedure(s):  RIGHT TOTAL HIP ARTHROPLASTY  ANTERIOR APPROACH   BY: Karolyn Hatchet, MD             ON: 4/9/2018                  Code Status: Full Code             Advance Directive? Received (Send w/patient)     Isolation:  There are currently no Active Isolations       MDRO: No current active infections    BACKGROUND     Allergies:   Allergies   Allergen Reactions    Ceclor [Cefaclor] Hives       Past Medical History:   Diagnosis Date    Acid reflux     Acoustic neuroma ([facility] [address] 05/2012    GAMMA KNIFE DR Walters Loseravin    Asthma     LAST EXACERBATION 2015    Bilateral hearing loss 3/19/2018    Chronic obstructive pulmonary disease (HCC)     Chronic pain     left  leg    Hypothyroid     Other ill-defined conditions(799.89)     cavinous sinus thrombosis    Shingles 2014    Stroke [facility]) 08/2012    CAVERNOUS SINUS THROMBOSIS        Past Surgical History:   Procedure Laterality Date    HC SLNG OBTURATOR SYS TVT GYNECA  2008    HX CATARACT REMOVAL  2014    HX CERVICAL FUSION  2011    C5-7    HX CHOLECYSTECTOMY  2010    HX COLONOSCOPY  2016  HX HEART CATHETERIZATION  2016    NEGATIVE PER PATIENT    HX HEENT Left 2017    BONE INDUCTION FOR HEARING AID    HX HYSTERECTOMY  1976    HX LUMBAR FUSION  2014    HX ORTHOPAEDIC  2006    TRIGGER FINGER    HX OTHER SURGICAL  2012    GAMMA KNIFE RADIATION    HX REFRACTIVE SURGERY  2003    HX TONSILLECTOMY  1948    ND COMBINED ANT/POST COLPORRHAPHY  2008    ND REMOVAL OF ANAL FISSURE  1965       Prior to Admission Medications   Prescriptions Last Dose Informant Patient Reported? Taking? Calcium-Cholecalciferol, D3, 500 mg(1,250mg) -400 unit tab 4/9/2018 at Unknown time  Yes Yes   Sig: Take 1 Tab by mouth daily. MULTIVITAMIN PO 4/2/2018 at Unknown time  Yes Yes   Sig: Take 1 Tab by mouth daily. albuterol (PROAIR HFA) 90 mcg/actuation inhaler Unknown at Unknown time  Yes No   Sig: Take 2 Puffs by inhalation every four (4) hours as needed for Wheezing. budesonide-formoterol (SYMBICORT) 80-4.5 mcg/actuation HFAA 4/9/2018 at Unknown time  Yes Yes   Sig: Take 2 Puffs by inhalation two (2) times a day. levothyroxine (SYNTHROID) 100 mcg tablet 4/9/2018 at Unknown time  Yes Yes   Sig: Take 100 mcg by mouth Daily (before breakfast). loratadine (CLARITIN) 10 mg tablet 4/9/2018 at Unknown time  Yes Yes   Sig: Take 10 mg by mouth as needed. montelukast (SINGULAIR) 10 mg tablet 4/9/2018 at Unknown time  Yes Yes   Sig: Take 10 mg by mouth daily. raNITIdine (ZANTAC) 150 mg tablet 4/9/2018 at Unknown time  Yes Yes   Sig: Take 150 mg by mouth two (2) times a day. tiotropium (SPIRIVA WITH HANDIHALER) 18 mcg inhalation capsule 4/9/2018 at Unknown time  Yes Yes   Sig: Take 1 Cap by inhalation daily. tolterodine ER (DETROL LA) 4 mg ER capsule 4/9/2018 at Unknown time  Yes Yes   Sig: Take 4 mg by mouth daily. Facility-Administered Medications: None       Vaccinations: There is no immunization history on file for this patient. ASSESSMENT   Age: 78 y.o.              Gender: female        Height: Height: 5' 0.5\" (153.7 cm)                    Weight:Weight: 64 kg (141 lb)     Patient Vitals for the past 8 hrs:   Temp Pulse Resp BP SpO2   04/10/18 1412 - - - - 97 %   04/10/18 1408 97.9 °F (36.6 °C) 85 16 104/66 97 %   04/10/18 1315 - 76 - 104/63 -            Active Orders   Diet    DIET REGULAR       Orientation: Orientation Level: Oriented X4    Active Lines/Drains:  (Peg Tube / Osorio / CL or S/L?):no    Urinary Status: Voiding      Last BM: Last Bowel Movement Date: 04/09/18     Skin Integrity: Incision (comment) (right hip)   Wound Hip Anterior;Right-DRESSING STATUS: Clean, dry, and intact    Wound Hip Anterior;Right-DRESSING TYPE: Silver products    Mobility: Slightly limited   Weight Bearing Status: WBAT (Weight Bearing as Tolerated)      Gait Training  Assistive Device: Gait belt, Walker, rolling  Ambulation - Level of Assistance: Contact guard assistance  Distance (ft): 150 Feet (ft)  Stairs - Level of Assistance: Contact guard assistance  Number of Stairs Trained: 4  Rail Use: Right  (SPC in L UE)     On Anticoagulation? YES  Aspirin                                      Last dose:  325 mg at 1800      Pain Medications given:  Tramadol 50 mg                                           1800 4/10/18  Lab Results   Component Value Date/Time    Glucose 115 (H) 04/10/2018 06:43 AM    Hemoglobin A1c 5.2 03/14/2018 09:45 AM    INR 1.0 03/14/2018 09:45 AM    INR 1.0 01/04/2011 10:14 AM    HGB 9.0 (L) 04/10/2018 06:43 AM    HGB 13.4 03/14/2018 09:45 AM    HGB 10.0 (L) 01/09/2014 02:25 AM    HGB 11.2 (L) 01/08/2014 03:40 AM       Readmission Risks:  Score:  14      RECOMMENDATION     See After Visit Summary (AVS) for:  · Discharge instructions  · After [address]   · Medication Reconciliation          [facility] Orthopaedic Nurse Navigator  VALERY Stallworth, RN-BC       Office  [phone]  [facility]      [phone]  Fax      [phone]  [email or web address]             . [place]

## 2018-07-19 ENCOUNTER — HOSPITAL ENCOUNTER (OUTPATIENT)
Dept: MRI IMAGING | Age: 78
Discharge: HOME | End: 2018-07-19
Attending: ORTHOPAEDIC SURGERY
Payer: MEDICARE

## 2018-07-19 DIAGNOSIS — M48.061 LUMBAR SPINAL STENOSIS: ICD-10-CM

## 2018-07-19 DIAGNOSIS — M54.16 LUMBAR RADICULOPATHY: ICD-10-CM

## 2018-07-19 PROCEDURE — 72148 MRI LUMBAR SPINE W/O DYE: CPT

## 2018-08-28 RX ORDER — GUAIFENESIN 100 MG/5ML
81 LIQUID (ML) ORAL DAILY
COMMUNITY
End: 2018-09-01

## 2018-08-28 RX ORDER — BUDESONIDE AND FORMOTEROL FUMARATE DIHYDRATE 160; 4.5 UG/1; UG/1
2 AEROSOL RESPIRATORY (INHALATION) 2 TIMES DAILY
Status: ON HOLD | COMMUNITY
End: 2021-04-26

## 2018-08-28 NOTE — PERIOP NOTES
DAY PREOP PHONE INTERVIEW COMPLETED WITH:  GLENYS Villatoro, PATIENT. PATIENT ADVISED NOT TO EAT ANYTHING PAST MIDNIGHT EVENING PRIOR TO SURGERY,  NOTHING TO EAT;  LEAVE ALL VALUABLES AT HOME; DO BRING PICTURE ID, INSURANCE CARD AND ANY COPAY; WEAR COMFORTABLE CLOTHING;  NO PERFUMES, POWDERS, LOTIONS; NO ALCOHOL 24 HOURS BEFORE OR AFTER SURGERY;  WILL NEED TO BE DRIVEN HOME BY FAMILY OR FRIEND;  AVOID TAKING NSAIDS, ASPIRIN, FISH OIL, VITAMIN E OR GLUCOSAMINE/CHONDROITIN DURING THIS TIME PRIOR TO SURGERY;  MAY TAKE TYLENOL. INSTRUCTED TO REPORT TO [address] [place] [address] Road BY SURGEON'S OFFICE. PREOP DIET AND NUTRITION UPDATED GUIDELINES/ INSTRUCTIONS REVIEWED WITH PATIENT. PATIENT STATES PREOP LABS AND EKG PERFORMED BY PCP AND SENT TO DR GARCIA.

## 2018-08-29 ENCOUNTER — ANESTHESIA EVENT (OUTPATIENT)
Dept: SURGERY | Age: 78
DRG: 460 | End: 2018-08-29
Payer: MEDICARE

## 2018-08-29 ENCOUNTER — APPOINTMENT (OUTPATIENT)
Dept: GENERAL RADIOLOGY | Age: 78
DRG: 460 | End: 2018-08-29
Attending: ORTHOPAEDIC SURGERY
Payer: MEDICARE

## 2018-08-29 ENCOUNTER — HOSPITAL ENCOUNTER (INPATIENT)
Age: 78
LOS: 3 days | Discharge: HOME HEALTH | DRG: 460 | End: 2018-09-01
Attending: ORTHOPAEDIC SURGERY | Admitting: ORTHOPAEDIC SURGERY
Payer: MEDICARE

## 2018-08-29 ENCOUNTER — ANESTHESIA (OUTPATIENT)
Dept: SURGERY | Age: 78
DRG: 460 | End: 2018-08-29
Payer: MEDICARE

## 2018-08-29 DIAGNOSIS — M48.061 SPINAL STENOSIS OF LUMBAR REGION, UNSPECIFIED WHETHER NEUROGENIC CLAUDICATION PRESENT: Primary | ICD-10-CM

## 2018-08-29 LAB
ABO + RH BLD: NORMAL
BLOOD GROUP ANTIBODIES SERPL: NORMAL
GLUCOSE BLD STRIP.AUTO-MCNC: 101 MG/DL (ref 65–100)
SERVICE CMNT-IMP: ABNORMAL
SPECIMEN EXP DATE BLD: NORMAL

## 2018-08-29 PROCEDURE — 74011250636 HC RX REV CODE- 250/636: Performed by: ANESTHESIOLOGY

## 2018-08-29 PROCEDURE — 77030018836 HC SOL IRR NACL ICUM -A: Performed by: ORTHOPAEDIC SURGERY

## 2018-08-29 PROCEDURE — 74011000250 HC RX REV CODE- 250

## 2018-08-29 PROCEDURE — 3E0U0GB INTRODUCTION OF RECOMBINANT BONE MORPHOGENETIC PROTEIN INTO JOINTS, OPEN APPROACH: ICD-10-PCS | Performed by: ORTHOPAEDIC SURGERY

## 2018-08-29 PROCEDURE — 76010000171 HC OR TIME 2 TO 2.5 HR INTENSV-TIER 1: Performed by: ORTHOPAEDIC SURGERY

## 2018-08-29 PROCEDURE — 82962 GLUCOSE BLOOD TEST: CPT

## 2018-08-29 PROCEDURE — 77030008684 HC TU ET CUF COVD -B: Performed by: NURSE ANESTHETIST, CERTIFIED REGISTERED

## 2018-08-29 PROCEDURE — 76210000017 HC OR PH I REC 1.5 TO 2 HR: Performed by: ORTHOPAEDIC SURGERY

## 2018-08-29 PROCEDURE — 76060000035 HC ANESTHESIA 2 TO 2.5 HR: Performed by: ORTHOPAEDIC SURGERY

## 2018-08-29 PROCEDURE — 94762 N-INVAS EAR/PLS OXIMTRY CONT: CPT

## 2018-08-29 PROCEDURE — 77030020782 HC GWN BAIR PAWS FLX 3M -B

## 2018-08-29 PROCEDURE — 72020 X-RAY EXAM OF SPINE 1 VIEW: CPT

## 2018-08-29 PROCEDURE — 74011250637 HC RX REV CODE- 250/637: Performed by: PHYSICIAN ASSISTANT

## 2018-08-29 PROCEDURE — C1713 ANCHOR/SCREW BN/BN,TIS/BN: HCPCS | Performed by: ORTHOPAEDIC SURGERY

## 2018-08-29 PROCEDURE — 77030014007 HC SPNG HEMSTAT J&J -B: Performed by: ORTHOPAEDIC SURGERY

## 2018-08-29 PROCEDURE — 77030012893

## 2018-08-29 PROCEDURE — 65270000032 HC RM SEMIPRIVATE

## 2018-08-29 PROCEDURE — 77030038600 HC TU BPLR IRR DISP STRY -B: Performed by: ORTHOPAEDIC SURGERY

## 2018-08-29 PROCEDURE — 77030029684 HC NEB SM VOL KT MONA -A

## 2018-08-29 PROCEDURE — 94640 AIRWAY INHALATION TREATMENT: CPT

## 2018-08-29 PROCEDURE — 77030012406 HC DRN WND PENRS BARD -A: Performed by: ORTHOPAEDIC SURGERY

## 2018-08-29 PROCEDURE — 77030029099 HC BN WAX SSPC -A: Performed by: ORTHOPAEDIC SURGERY

## 2018-08-29 PROCEDURE — 77030013079 HC BLNKT BAIR HGGR 3M -A: Performed by: NURSE ANESTHETIST, CERTIFIED REGISTERED

## 2018-08-29 PROCEDURE — 36415 COLL VENOUS BLD VENIPUNCTURE: CPT | Performed by: ORTHOPAEDIC SURGERY

## 2018-08-29 PROCEDURE — 74011250636 HC RX REV CODE- 250/636: Performed by: ORTHOPAEDIC SURGERY

## 2018-08-29 PROCEDURE — 94760 N-INVAS EAR/PLS OXIMETRY 1: CPT

## 2018-08-29 PROCEDURE — 77030014647 HC SEAL FBRN TISSL BAXT -D: Performed by: ORTHOPAEDIC SURGERY

## 2018-08-29 PROCEDURE — 0SG30K1 FUSION OF LUMBOSACRAL JOINT WITH NONAUTOLOGOUS TISSUE SUBSTITUTE, POSTERIOR APPROACH, POSTERIOR COLUMN, OPEN APPROACH: ICD-10-PCS | Performed by: ORTHOPAEDIC SURGERY

## 2018-08-29 PROCEDURE — 74011000250 HC RX REV CODE- 250: Performed by: PHYSICIAN ASSISTANT

## 2018-08-29 PROCEDURE — 74011250636 HC RX REV CODE- 250/636

## 2018-08-29 PROCEDURE — 77030026438 HC STYL ET INTUB CARD -A: Performed by: NURSE ANESTHETIST, CERTIFIED REGISTERED

## 2018-08-29 PROCEDURE — 77030038020 HC MANFLD NEPTUNE STRY -B: Performed by: ORTHOPAEDIC SURGERY

## 2018-08-29 PROCEDURE — 74011250636 HC RX REV CODE- 250/636: Performed by: PHYSICIAN ASSISTANT

## 2018-08-29 PROCEDURE — 77030039267 HC ADH SKN EXOFIN S2SG -B: Performed by: ORTHOPAEDIC SURGERY

## 2018-08-29 PROCEDURE — 77030032490 HC SLV COMPR SCD KNE COVD -B: Performed by: ORTHOPAEDIC SURGERY

## 2018-08-29 PROCEDURE — 0SG10K1 FUSION OF 2 OR MORE LUMBAR VERTEBRAL JOINTS WITH NONAUTOLOGOUS TISSUE SUBSTITUTE, POSTERIOR APPROACH, POSTERIOR COLUMN, OPEN APPROACH: ICD-10-PCS | Performed by: ORTHOPAEDIC SURGERY

## 2018-08-29 PROCEDURE — 77010033678 HC OXYGEN DAILY

## 2018-08-29 PROCEDURE — 77030037728 HC GRFT BN FBR CORT 3DEMIN 30CC BACT -I: Performed by: ORTHOPAEDIC SURGERY

## 2018-08-29 PROCEDURE — 74011000250 HC RX REV CODE- 250: Performed by: ORTHOPAEDIC SURGERY

## 2018-08-29 PROCEDURE — 77030031139 HC SUT VCRL2 J&J -A: Performed by: ORTHOPAEDIC SURGERY

## 2018-08-29 PROCEDURE — 94664 DEMO&/EVAL PT USE INHALER: CPT

## 2018-08-29 PROCEDURE — 86900 BLOOD TYPING SEROLOGIC ABO: CPT | Performed by: ORTHOPAEDIC SURGERY

## 2018-08-29 PROCEDURE — 77030003194 HC GRFT RECOMB BN MEDT -I1: Performed by: ORTHOPAEDIC SURGERY

## 2018-08-29 DEVICE — BONE GRAFT KIT 7510200 INFUSE SMALL
Type: IMPLANTABLE DEVICE | Site: BACK | Status: FUNCTIONAL
Brand: INFUSE® BONE GRAFT

## 2018-08-29 DEVICE — CREO&REG; 5.5, 6.5 X 45MM POLYAXIAL SCREW
Type: IMPLANTABLE DEVICE | Site: BACK | Status: FUNCTIONAL
Brand: CREO

## 2018-08-29 DEVICE — 5.5MM CURVED ROD, TITANIUM ALLOY, 90MM LENGTH
Type: IMPLANTABLE DEVICE | Site: BACK | Status: FUNCTIONAL
Brand: CREO

## 2018-08-29 DEVICE — 5.5MM CURVED ROD, TITANIUM ALLOY, 85MM LENGTH
Type: IMPLANTABLE DEVICE | Site: BACK | Status: FUNCTIONAL
Brand: CREO

## 2018-08-29 DEVICE — 5.5 LOCKING CAP, CREO
Type: IMPLANTABLE DEVICE | Site: BACK | Status: FUNCTIONAL
Brand: CREO

## 2018-08-29 DEVICE — GRAFT BNE 3D 30 CC CORTICAL FIBER: Type: IMPLANTABLE DEVICE | Site: BACK | Status: FUNCTIONAL

## 2018-08-29 RX ORDER — FENTANYL CITRATE 50 UG/ML
25 INJECTION, SOLUTION INTRAMUSCULAR; INTRAVENOUS
Status: COMPLETED | OUTPATIENT
Start: 2018-08-29 | End: 2018-08-29

## 2018-08-29 RX ORDER — ARFORMOTEROL TARTRATE 15 UG/2ML
15 SOLUTION RESPIRATORY (INHALATION)
Status: DISCONTINUED | OUTPATIENT
Start: 2018-08-29 | End: 2018-09-01 | Stop reason: HOSPADM

## 2018-08-29 RX ORDER — DIAZEPAM 5 MG/1
5 TABLET ORAL
Status: DISCONTINUED | OUTPATIENT
Start: 2018-08-29 | End: 2018-09-01 | Stop reason: HOSPADM

## 2018-08-29 RX ORDER — LIDOCAINE HYDROCHLORIDE 10 MG/ML
0.1 INJECTION, SOLUTION EPIDURAL; INFILTRATION; INTRACAUDAL; PERINEURAL AS NEEDED
Status: DISCONTINUED | OUTPATIENT
Start: 2018-08-29 | End: 2018-08-29 | Stop reason: HOSPADM

## 2018-08-29 RX ORDER — SUCCINYLCHOLINE CHLORIDE 20 MG/ML
INJECTION INTRAMUSCULAR; INTRAVENOUS AS NEEDED
Status: DISCONTINUED | OUTPATIENT
Start: 2018-08-29 | End: 2018-08-29 | Stop reason: HOSPADM

## 2018-08-29 RX ORDER — LIDOCAINE HYDROCHLORIDE 20 MG/ML
INJECTION, SOLUTION EPIDURAL; INFILTRATION; INTRACAUDAL; PERINEURAL AS NEEDED
Status: DISCONTINUED | OUTPATIENT
Start: 2018-08-29 | End: 2018-08-29 | Stop reason: HOSPADM

## 2018-08-29 RX ORDER — LEVOTHYROXINE SODIUM 100 UG/1
100 TABLET ORAL
Status: DISCONTINUED | OUTPATIENT
Start: 2018-08-30 | End: 2018-09-01 | Stop reason: HOSPADM

## 2018-08-29 RX ORDER — SODIUM CHLORIDE, SODIUM LACTATE, POTASSIUM CHLORIDE, CALCIUM CHLORIDE 600; 310; 30; 20 MG/100ML; MG/100ML; MG/100ML; MG/100ML
75 INJECTION, SOLUTION INTRAVENOUS CONTINUOUS
Status: DISCONTINUED | OUTPATIENT
Start: 2018-08-29 | End: 2018-08-29 | Stop reason: HOSPADM

## 2018-08-29 RX ORDER — SODIUM CHLORIDE 0.9 % (FLUSH) 0.9 %
5-10 SYRINGE (ML) INJECTION EVERY 8 HOURS
Status: DISCONTINUED | OUTPATIENT
Start: 2018-08-30 | End: 2018-09-01 | Stop reason: HOSPADM

## 2018-08-29 RX ORDER — OXYBUTYNIN CHLORIDE 5 MG/1
5 TABLET, EXTENDED RELEASE ORAL DAILY
Status: DISCONTINUED | OUTPATIENT
Start: 2018-08-30 | End: 2018-09-01 | Stop reason: HOSPADM

## 2018-08-29 RX ORDER — ONDANSETRON 2 MG/ML
4 INJECTION INTRAMUSCULAR; INTRAVENOUS AS NEEDED
Status: DISCONTINUED | OUTPATIENT
Start: 2018-08-29 | End: 2018-08-29 | Stop reason: HOSPADM

## 2018-08-29 RX ORDER — MONTELUKAST SODIUM 10 MG/1
10 TABLET ORAL DAILY
Status: DISCONTINUED | OUTPATIENT
Start: 2018-08-30 | End: 2018-09-01 | Stop reason: HOSPADM

## 2018-08-29 RX ORDER — ROCURONIUM BROMIDE 10 MG/ML
INJECTION, SOLUTION INTRAVENOUS AS NEEDED
Status: DISCONTINUED | OUTPATIENT
Start: 2018-08-29 | End: 2018-08-29 | Stop reason: HOSPADM

## 2018-08-29 RX ORDER — SODIUM CHLORIDE, SODIUM LACTATE, POTASSIUM CHLORIDE, CALCIUM CHLORIDE 600; 310; 30; 20 MG/100ML; MG/100ML; MG/100ML; MG/100ML
125 INJECTION, SOLUTION INTRAVENOUS CONTINUOUS
Status: DISCONTINUED | OUTPATIENT
Start: 2018-08-29 | End: 2018-08-29 | Stop reason: HOSPADM

## 2018-08-29 RX ORDER — MIDAZOLAM HYDROCHLORIDE 1 MG/ML
1 INJECTION, SOLUTION INTRAMUSCULAR; INTRAVENOUS AS NEEDED
Status: DISCONTINUED | OUTPATIENT
Start: 2018-08-29 | End: 2018-08-29 | Stop reason: HOSPADM

## 2018-08-29 RX ORDER — DEXMEDETOMIDINE HYDROCHLORIDE 4 UG/ML
INJECTION, SOLUTION INTRAVENOUS AS NEEDED
Status: DISCONTINUED | OUTPATIENT
Start: 2018-08-29 | End: 2018-08-29 | Stop reason: HOSPADM

## 2018-08-29 RX ORDER — OXYCODONE HYDROCHLORIDE 5 MG/1
5 TABLET ORAL
Status: DISCONTINUED | OUTPATIENT
Start: 2018-08-29 | End: 2018-09-01 | Stop reason: HOSPADM

## 2018-08-29 RX ORDER — FACIAL-BODY WIPES
10 EACH TOPICAL DAILY PRN
Status: DISCONTINUED | OUTPATIENT
Start: 2018-08-31 | End: 2018-09-01 | Stop reason: HOSPADM

## 2018-08-29 RX ORDER — SODIUM CHLORIDE 0.9 % (FLUSH) 0.9 %
5-10 SYRINGE (ML) INJECTION AS NEEDED
Status: DISCONTINUED | OUTPATIENT
Start: 2018-08-29 | End: 2018-08-29 | Stop reason: HOSPADM

## 2018-08-29 RX ORDER — AMOXICILLIN 250 MG
1 CAPSULE ORAL 2 TIMES DAILY
Status: DISCONTINUED | OUTPATIENT
Start: 2018-08-29 | End: 2018-09-01 | Stop reason: HOSPADM

## 2018-08-29 RX ORDER — FENTANYL CITRATE 50 UG/ML
INJECTION, SOLUTION INTRAMUSCULAR; INTRAVENOUS AS NEEDED
Status: DISCONTINUED | OUTPATIENT
Start: 2018-08-29 | End: 2018-08-29 | Stop reason: HOSPADM

## 2018-08-29 RX ORDER — SODIUM CHLORIDE 0.9 % (FLUSH) 0.9 %
5-10 SYRINGE (ML) INJECTION EVERY 8 HOURS
Status: DISCONTINUED | OUTPATIENT
Start: 2018-08-29 | End: 2018-09-01 | Stop reason: HOSPADM

## 2018-08-29 RX ORDER — MORPHINE SULFATE 4 MG/ML
INJECTION INTRAVENOUS
Status: DISPENSED
Start: 2018-08-29 | End: 2018-08-30

## 2018-08-29 RX ORDER — FAMOTIDINE 20 MG/1
20 TABLET, FILM COATED ORAL 2 TIMES DAILY
Status: DISCONTINUED | OUTPATIENT
Start: 2018-08-29 | End: 2018-09-01 | Stop reason: HOSPADM

## 2018-08-29 RX ORDER — PHENYLEPHRINE HCL IN 0.9% NACL 0.4MG/10ML
SYRINGE (ML) INTRAVENOUS AS NEEDED
Status: DISCONTINUED | OUTPATIENT
Start: 2018-08-29 | End: 2018-08-29 | Stop reason: HOSPADM

## 2018-08-29 RX ORDER — DIPHENHYDRAMINE HYDROCHLORIDE 50 MG/ML
12.5 INJECTION, SOLUTION INTRAMUSCULAR; INTRAVENOUS AS NEEDED
Status: DISCONTINUED | OUTPATIENT
Start: 2018-08-29 | End: 2018-08-29 | Stop reason: HOSPADM

## 2018-08-29 RX ORDER — MORPHINE SULFATE 10 MG/ML
2 INJECTION, SOLUTION INTRAMUSCULAR; INTRAVENOUS
Status: DISCONTINUED | OUTPATIENT
Start: 2018-08-29 | End: 2018-08-29 | Stop reason: HOSPADM

## 2018-08-29 RX ORDER — SODIUM CHLORIDE 0.9 % (FLUSH) 0.9 %
5-10 SYRINGE (ML) INJECTION AS NEEDED
Status: DISCONTINUED | OUTPATIENT
Start: 2018-08-29 | End: 2018-09-01 | Stop reason: HOSPADM

## 2018-08-29 RX ORDER — FENTANYL CITRATE 50 UG/ML
50 INJECTION, SOLUTION INTRAMUSCULAR; INTRAVENOUS AS NEEDED
Status: DISCONTINUED | OUTPATIENT
Start: 2018-08-29 | End: 2018-08-29 | Stop reason: HOSPADM

## 2018-08-29 RX ORDER — DEXAMETHASONE SODIUM PHOSPHATE 4 MG/ML
INJECTION, SOLUTION INTRA-ARTICULAR; INTRALESIONAL; INTRAMUSCULAR; INTRAVENOUS; SOFT TISSUE AS NEEDED
Status: DISCONTINUED | OUTPATIENT
Start: 2018-08-29 | End: 2018-08-29 | Stop reason: HOSPADM

## 2018-08-29 RX ORDER — MIDAZOLAM HYDROCHLORIDE 1 MG/ML
INJECTION, SOLUTION INTRAMUSCULAR; INTRAVENOUS AS NEEDED
Status: DISCONTINUED | OUTPATIENT
Start: 2018-08-29 | End: 2018-08-29 | Stop reason: HOSPADM

## 2018-08-29 RX ORDER — ONDANSETRON 2 MG/ML
INJECTION INTRAMUSCULAR; INTRAVENOUS AS NEEDED
Status: DISCONTINUED | OUTPATIENT
Start: 2018-08-29 | End: 2018-08-29 | Stop reason: HOSPADM

## 2018-08-29 RX ORDER — HYDROMORPHONE HYDROCHLORIDE 2 MG/ML
INJECTION, SOLUTION INTRAMUSCULAR; INTRAVENOUS; SUBCUTANEOUS AS NEEDED
Status: DISCONTINUED | OUTPATIENT
Start: 2018-08-29 | End: 2018-08-29 | Stop reason: HOSPADM

## 2018-08-29 RX ORDER — ACETAMINOPHEN 325 MG/1
650 TABLET ORAL EVERY 6 HOURS
Status: DISCONTINUED | OUTPATIENT
Start: 2018-08-29 | End: 2018-09-01 | Stop reason: HOSPADM

## 2018-08-29 RX ORDER — CEFAZOLIN SODIUM/WATER 2 G/20 ML
2 SYRINGE (ML) INTRAVENOUS ONCE
Status: COMPLETED | OUTPATIENT
Start: 2018-08-29 | End: 2018-08-29

## 2018-08-29 RX ORDER — OXYCODONE HYDROCHLORIDE 5 MG/1
10 TABLET ORAL
Status: DISCONTINUED | OUTPATIENT
Start: 2018-08-29 | End: 2018-09-01 | Stop reason: HOSPADM

## 2018-08-29 RX ORDER — FERROUS SULFATE, DRIED 160(50) MG
1 TABLET, EXTENDED RELEASE ORAL DAILY
Status: DISCONTINUED | OUTPATIENT
Start: 2018-08-30 | End: 2018-09-01 | Stop reason: HOSPADM

## 2018-08-29 RX ORDER — ALBUTEROL SULFATE 90 UG/1
2 AEROSOL, METERED RESPIRATORY (INHALATION)
Status: DISCONTINUED | OUTPATIENT
Start: 2018-08-29 | End: 2018-08-29 | Stop reason: CLARIF

## 2018-08-29 RX ORDER — MIDAZOLAM HYDROCHLORIDE 1 MG/ML
0.5 INJECTION, SOLUTION INTRAMUSCULAR; INTRAVENOUS
Status: COMPLETED | OUTPATIENT
Start: 2018-08-29 | End: 2018-08-29

## 2018-08-29 RX ORDER — NEOSTIGMINE METHYLSULFATE 1 MG/ML
INJECTION INTRAVENOUS AS NEEDED
Status: DISCONTINUED | OUTPATIENT
Start: 2018-08-29 | End: 2018-08-29 | Stop reason: HOSPADM

## 2018-08-29 RX ORDER — PROPOFOL 10 MG/ML
INJECTION, EMULSION INTRAVENOUS AS NEEDED
Status: DISCONTINUED | OUTPATIENT
Start: 2018-08-29 | End: 2018-08-29 | Stop reason: HOSPADM

## 2018-08-29 RX ORDER — HYDROMORPHONE HCL/0.9% NACL/PF 0.5 MG/ML
PLASTIC BAG, INJECTION (ML) INTRAVENOUS
Status: DISCONTINUED | OUTPATIENT
Start: 2018-08-29 | End: 2018-08-30

## 2018-08-29 RX ORDER — SODIUM CHLORIDE 0.9 % (FLUSH) 0.9 %
5-10 SYRINGE (ML) INJECTION EVERY 8 HOURS
Status: DISCONTINUED | OUTPATIENT
Start: 2018-08-29 | End: 2018-08-29 | Stop reason: HOSPADM

## 2018-08-29 RX ORDER — VANCOMYCIN HYDROCHLORIDE 1 G/20ML
INJECTION, POWDER, LYOPHILIZED, FOR SOLUTION INTRAVENOUS AS NEEDED
Status: DISCONTINUED | OUTPATIENT
Start: 2018-08-29 | End: 2018-08-29 | Stop reason: HOSPADM

## 2018-08-29 RX ORDER — SODIUM CHLORIDE 9 MG/ML
25 INJECTION, SOLUTION INTRAVENOUS CONTINUOUS
Status: DISCONTINUED | OUTPATIENT
Start: 2018-08-29 | End: 2018-08-29 | Stop reason: HOSPADM

## 2018-08-29 RX ORDER — HYDROCODONE BITARTRATE AND ACETAMINOPHEN 5; 325 MG/1; MG/1
1 TABLET ORAL AS NEEDED
Status: DISCONTINUED | OUTPATIENT
Start: 2018-08-29 | End: 2018-08-29 | Stop reason: HOSPADM

## 2018-08-29 RX ORDER — BUDESONIDE 0.5 MG/2ML
500 INHALANT ORAL
Status: DISCONTINUED | OUTPATIENT
Start: 2018-08-29 | End: 2018-09-01 | Stop reason: HOSPADM

## 2018-08-29 RX ORDER — SODIUM CHLORIDE 9 MG/ML
50 INJECTION, SOLUTION INTRAVENOUS CONTINUOUS
Status: DISCONTINUED | OUTPATIENT
Start: 2018-08-29 | End: 2018-08-29 | Stop reason: HOSPADM

## 2018-08-29 RX ORDER — GLYCOPYRROLATE 0.2 MG/ML
INJECTION INTRAMUSCULAR; INTRAVENOUS AS NEEDED
Status: DISCONTINUED | OUTPATIENT
Start: 2018-08-29 | End: 2018-08-29 | Stop reason: HOSPADM

## 2018-08-29 RX ORDER — ALBUTEROL SULFATE 0.83 MG/ML
2.5 SOLUTION RESPIRATORY (INHALATION)
Status: DISCONTINUED | OUTPATIENT
Start: 2018-08-29 | End: 2018-09-01 | Stop reason: HOSPADM

## 2018-08-29 RX ORDER — CEFAZOLIN SODIUM/WATER 2 G/20 ML
2 SYRINGE (ML) INTRAVENOUS EVERY 8 HOURS
Status: COMPLETED | OUTPATIENT
Start: 2018-08-29 | End: 2018-08-30

## 2018-08-29 RX ORDER — SODIUM CHLORIDE, SODIUM LACTATE, POTASSIUM CHLORIDE, CALCIUM CHLORIDE 600; 310; 30; 20 MG/100ML; MG/100ML; MG/100ML; MG/100ML
INJECTION, SOLUTION INTRAVENOUS
Status: DISCONTINUED | OUTPATIENT
Start: 2018-08-29 | End: 2018-08-29 | Stop reason: HOSPADM

## 2018-08-29 RX ORDER — NALOXONE HYDROCHLORIDE 0.4 MG/ML
0.4 INJECTION, SOLUTION INTRAMUSCULAR; INTRAVENOUS; SUBCUTANEOUS AS NEEDED
Status: DISCONTINUED | OUTPATIENT
Start: 2018-08-29 | End: 2018-09-01 | Stop reason: HOSPADM

## 2018-08-29 RX ORDER — ONDANSETRON 2 MG/ML
4 INJECTION INTRAMUSCULAR; INTRAVENOUS
Status: DISCONTINUED | OUTPATIENT
Start: 2018-08-29 | End: 2018-09-01 | Stop reason: HOSPADM

## 2018-08-29 RX ORDER — DIPHENHYDRAMINE HYDROCHLORIDE 50 MG/ML
12.5 INJECTION, SOLUTION INTRAMUSCULAR; INTRAVENOUS
Status: DISCONTINUED | OUTPATIENT
Start: 2018-08-29 | End: 2018-09-01 | Stop reason: HOSPADM

## 2018-08-29 RX ORDER — SODIUM CHLORIDE 9 MG/ML
125 INJECTION, SOLUTION INTRAVENOUS CONTINUOUS
Status: DISPENSED | OUTPATIENT
Start: 2018-08-29 | End: 2018-08-30

## 2018-08-29 RX ADMIN — Medication 2 G: at 21:53

## 2018-08-29 RX ADMIN — LIDOCAINE HYDROCHLORIDE 60 MG: 20 INJECTION, SOLUTION EPIDURAL; INFILTRATION; INTRACAUDAL; PERINEURAL at 13:32

## 2018-08-29 RX ADMIN — Medication 80 MCG: at 14:05

## 2018-08-29 RX ADMIN — Medication 80 MCG: at 14:17

## 2018-08-29 RX ADMIN — Medication 1 TABLET: at 19:28

## 2018-08-29 RX ADMIN — FENTANYL CITRATE 25 MCG: 50 INJECTION, SOLUTION INTRAMUSCULAR; INTRAVENOUS at 16:15

## 2018-08-29 RX ADMIN — BUDESONIDE 500 MCG: 0.5 INHALANT RESPIRATORY (INHALATION) at 21:56

## 2018-08-29 RX ADMIN — SODIUM CHLORIDE, SODIUM LACTATE, POTASSIUM CHLORIDE, CALCIUM CHLORIDE: 600; 310; 30; 20 INJECTION, SOLUTION INTRAVENOUS at 15:30

## 2018-08-29 RX ADMIN — SUCCINYLCHOLINE CHLORIDE 120 MG: 20 INJECTION INTRAMUSCULAR; INTRAVENOUS at 13:32

## 2018-08-29 RX ADMIN — MORPHINE SULFATE 2 MG: 10 INJECTION, SOLUTION INTRAMUSCULAR; INTRAVENOUS at 16:00

## 2018-08-29 RX ADMIN — DIAZEPAM 5 MG: 5 TABLET ORAL at 20:05

## 2018-08-29 RX ADMIN — ROCURONIUM BROMIDE 35 MG: 10 INJECTION, SOLUTION INTRAVENOUS at 13:39

## 2018-08-29 RX ADMIN — SODIUM CHLORIDE, SODIUM LACTATE, POTASSIUM CHLORIDE, AND CALCIUM CHLORIDE 125 ML/HR: 600; 310; 30; 20 INJECTION, SOLUTION INTRAVENOUS at 12:36

## 2018-08-29 RX ADMIN — PROPOFOL 130 MG: 10 INJECTION, EMULSION INTRAVENOUS at 13:32

## 2018-08-29 RX ADMIN — MIDAZOLAM 0.5 MG: 1 INJECTION INTRAMUSCULAR; INTRAVENOUS at 16:00

## 2018-08-29 RX ADMIN — MIDAZOLAM HYDROCHLORIDE 2 MG: 1 INJECTION, SOLUTION INTRAMUSCULAR; INTRAVENOUS at 13:20

## 2018-08-29 RX ADMIN — ARFORMOTEROL TARTRATE 15 MCG: 15 SOLUTION RESPIRATORY (INHALATION) at 21:56

## 2018-08-29 RX ADMIN — MIDAZOLAM 0.5 MG: 1 INJECTION INTRAMUSCULAR; INTRAVENOUS at 16:10

## 2018-08-29 RX ADMIN — Medication 80 MCG: at 13:48

## 2018-08-29 RX ADMIN — ACETAMINOPHEN 650 MG: 325 TABLET ORAL at 19:28

## 2018-08-29 RX ADMIN — ONDANSETRON 4 MG: 2 INJECTION INTRAMUSCULAR; INTRAVENOUS at 14:37

## 2018-08-29 RX ADMIN — FENTANYL CITRATE 25 MCG: 50 INJECTION, SOLUTION INTRAMUSCULAR; INTRAVENOUS at 15:50

## 2018-08-29 RX ADMIN — ROCURONIUM BROMIDE 5 MG: 10 INJECTION, SOLUTION INTRAVENOUS at 13:32

## 2018-08-29 RX ADMIN — FENTANYL CITRATE 25 MCG: 50 INJECTION, SOLUTION INTRAMUSCULAR; INTRAVENOUS at 16:25

## 2018-08-29 RX ADMIN — NEOSTIGMINE METHYLSULFATE 2.5 MG: 1 INJECTION INTRAVENOUS at 15:30

## 2018-08-29 RX ADMIN — Medication 2 G: at 13:39

## 2018-08-29 RX ADMIN — FAMOTIDINE 20 MG: 20 TABLET ORAL at 20:05

## 2018-08-29 RX ADMIN — FENTANYL CITRATE 25 MCG: 50 INJECTION, SOLUTION INTRAMUSCULAR; INTRAVENOUS at 16:05

## 2018-08-29 RX ADMIN — HYDROMORPHONE HYDROCHLORIDE 1 MG: 2 INJECTION, SOLUTION INTRAMUSCULAR; INTRAVENOUS; SUBCUTANEOUS at 13:58

## 2018-08-29 RX ADMIN — MORPHINE SULFATE 2 MG: 10 INJECTION, SOLUTION INTRAMUSCULAR; INTRAVENOUS at 16:17

## 2018-08-29 RX ADMIN — GLYCOPYRROLATE 0.4 MG: 0.2 INJECTION INTRAMUSCULAR; INTRAVENOUS at 15:30

## 2018-08-29 RX ADMIN — Medication 10 ML: at 21:53

## 2018-08-29 RX ADMIN — ACETAMINOPHEN 650 MG: 325 TABLET ORAL at 23:25

## 2018-08-29 RX ADMIN — MIDAZOLAM 0.5 MG: 1 INJECTION INTRAMUSCULAR; INTRAVENOUS at 17:03

## 2018-08-29 RX ADMIN — MIDAZOLAM 0.5 MG: 1 INJECTION INTRAMUSCULAR; INTRAVENOUS at 17:32

## 2018-08-29 RX ADMIN — SODIUM CHLORIDE 125 ML/HR: 900 INJECTION, SOLUTION INTRAVENOUS at 16:14

## 2018-08-29 RX ADMIN — FENTANYL CITRATE 50 MCG: 50 INJECTION, SOLUTION INTRAMUSCULAR; INTRAVENOUS at 13:32

## 2018-08-29 RX ADMIN — Medication: at 16:30

## 2018-08-29 RX ADMIN — FENTANYL CITRATE 50 MCG: 50 INJECTION, SOLUTION INTRAMUSCULAR; INTRAVENOUS at 13:52

## 2018-08-29 RX ADMIN — DEXAMETHASONE SODIUM PHOSPHATE 8 MG: 4 INJECTION, SOLUTION INTRA-ARTICULAR; INTRALESIONAL; INTRAMUSCULAR; INTRAVENOUS; SOFT TISSUE at 13:32

## 2018-08-29 RX ADMIN — DEXMEDETOMIDINE HYDROCHLORIDE 10 MCG: 4 INJECTION, SOLUTION INTRAVENOUS at 13:58

## 2018-08-29 NOTE — PROGRESS NOTES
TRANSFER - OUT REPORT:    Verbal report given to RN(name) on Alma Padilla  being transferred to 538(unit) for routine post - op       Report consisted of patients Situation, Background, Assessment and   Recommendations(SBAR). Time Pre op antibiotic given:1339  Anesthesia Stop time: 6093  Osorio Present on Transfer to floor:y  Order for Osorio on Chart:y  Discharge Prescriptions with Chart:n    Information from the following report(s) SBAR, Kardex, OR Summary, Procedure Summary, Intake/Output and MAR was reviewed with the receiving nurse. Opportunity for questions and clarification was provided. Is the patient on 02? YES       L/Min 2         Is the patient on a monitor? NO    Is the nurse transporting with the patient?no    Surgical Waiting Area notified of patient's transfer from PACU?  YES      The following personal items collected during your admission accompanied patient upon transfer:   Dental Appliance: Dental Appliances: None  Vision: Visual Aid: None  Hearing Aid:    Jewelry:    Clothing: Clothing:  (belongings sent to [employer])  Other Valuables:    Valuables sent to safe:

## 2018-08-29 NOTE — ANESTHESIA POSTPROCEDURE EVALUATION
Post-Anesthesia Evaluation and Assessment Patient: Elissa Dancer MRN: [id number]  SSN: [id number] YOB: 1940  Age: 78 y.o. Sex: female Cardiovascular Function/Vital Signs Visit Vitals  /44  Pulse 70  Temp 36.4 °C (97.5 °F)  Resp 18  Ht 5' 1\" (1.549 m)  Wt 63.5 kg (140 lb)  SpO2 98%  BMI 26.45 kg/m2 Patient is status post general anesthesia for Procedure(s): REVISION LUMBAR LAMINECTOMY OF FUSION L3-S1. Nausea/Vomiting: None Postoperative hydration reviewed and adequate. Pain: 
Pain Scale 1: Numeric (0 - 10) (08/29/18 1549) Pain Intensity 1: 8 (08/29/18 1549) Managed Neurological Status:  
Neuro (WDL): Exceptions to WDL (08/29/18 1549) Neuro Neurologic State: Drowsy (08/29/18 1549) LUE Motor Response: Purposeful (08/29/18 1549) LLE Motor Response: Purposeful (08/29/18 1549) RUE Motor Response: Purposeful (08/29/18 1549) RLE Motor Response: Purposeful (08/29/18 1549) At baseline Mental Status and Level of Consciousness: Arousable Pulmonary Status:  
O2 Device: Nasal cannula (08/29/18 1552) Adequate oxygenation and airway patent Complications related to anesthesia: None Post-anesthesia assessment completed. No concerns Signed By: Jeanie Durán MD   
 August 29, 2018

## 2018-08-29 NOTE — OP NOTES
[facility]  OPERATIVE REPORT    Lazaro Pain  MR#: [id number]  : 1940  ACCOUNT #: [de-identified]   DATE OF SERVICE: 2018    PREOPERATIVE DIAGNOSES:    1. Lumbar stenosis L3-4, left-sided lumbar radiculopathy. 2.  Status post lumbar decompression and spinal fusion, L4 to S1.    POSTOPERATIVE DIAGNOSES:  1. Lumbar stenosis L3-4, left-sided lumbar radiculopathy. 2.  Status post lumbar decompression and spinal fusion, L4 to S1.    PROCEDURES PERFORMED  1. Partial removal of segmental instrumentation, lumbar spine. 2.  Exploration, spinal fusion, lumbar spine. 3.  Revision, bilateral laminectomy at L3-L4. Bilateral decompression of the L3 and 4 nerve roots. 4.  Revision, posterior spinal fusion, L3 to S1, using Globus spinal instrumentation supplemented with supplemental cancellous allograft and bone morphogenic protein (small Infuse). SURGEON:  Cristi Cohen MD    ASSISTANT:  Casey Mayers PA-C    ANESTHESIA:  General.    ESTIMATED BLOOD LOSS:  50 ml    SPECIMENS REMOVED:  None    COMPLICATIONS:  None    IMPLANTS:    Implant Name Type Inv.  Item Serial No.  Lot No. LRB No. Used Action   3demin cortical fibers     [id number] [employer] NA N/A 1 Implanted   GRAFT BNE RECOMB HUM INFUS SM --  - SNA   GRAFT BNE RECOMB HUM INFUS SM --  NA [employer] [id number] N/A 1 Implanted   SCR SPNE PRE-ASSBL 6.5X45MM -- CREO 5.5MM - SN/A   SCR SPNE PRE-ASSBL 6.5X45MM -- CREO 5.5MM N/A [employer] N/A N/A 2 Implanted   screw     N/A [employer] N/A N/A 2 Implanted   SPENCER SPNE CRV TI 5.5X85MM -- CREO - SN/A   SPENCER SPNE CRV TI 5.5X85MM -- CREO N/A [employer] N/A N/A 1 Implanted   SPENCER SPNE CRV TI 5.5X90MM -- CREO - SN/A   SPENCER SPNE CRV TI 5.5X90MM -- CREO N/A [employer] N/A N/A 1 Implanted   CAP SPNE LCK CREO 5.5MM --  - SN/A    CAP SPNE LCK CREO 5.5MM --  N/A [employer] N/A N/A 8 Implanted        INDICATIONS:  A 81-year-old female known to me having undergone a prior decompression and spinal fusion L4 to S1 for symptomatic lumbar stenosis. She has done quite well. She came to us with pain and weakness in the left lower extremity. Weakness in the hip flexor function in particular. Imaging demonstrates degrees of stenosis at the L3-4 level in particular with accompanying foraminal compromise. Given her degree of symptomatology and those findings clinically and radiologically, a revision decompression and spinal fusion offered. Potential benefits and complications reviewed. DESCRIPTION OF PROCEDURE:  The patient was brought to the operating room in the supine position. General anesthetic administered. Low back region was then prepped and draped in normal fashion. Preoperative antibiotics administered. Thigh-high RAGINI hose and sequential pumps applied to the patient's lower extremity. Incision made extending from L2 to the sacrum. Subcutaneous tissue then divided in line with the incision down to the level of the fascia. The fascia was incised in the midline. A subperiosteal dissection completed over the spinous process and laminar surfaces proximally. Distally, we kept the dissection a bit lateral to midline, eventually exposing the instrumentation L4 to S1. Locking caps were removed as well as the analilia. The upper screw removed. Did take down the scar tissue enough to explore the upper fusion, which appeared to be solid. There was no obvious motion with stress testing to the other segments, either. I then identified the transverse process of L4 and L3. Facet joint capsule and osteophytes about the facet joint resected. The complete lamina at L3 resected. Lamina thinned using a Leksell rongeur. Bone wax applied to the surface. Using a series of curettes, eventually got into the epidural space and completed the decompression of the L3 and 4 nerve roots.   A pedicle to pedicle decompression ensued and a foraminotomy over the L3 roots in particular, paying attention to the left side. Once completed, the roots thought to be free and mobile. Ball probe passed with ease. Disk space explored, but not really thought to be a disk herniation per se. I then placed drill holes in the pedicle of L3. Pedicle probed in a routine fashion. Pins placed as a marker. An x-ray was taken to verify position and direction. Then, 30 mL of cancellous allograft utilized and packed over the decorticated surfaces to include the transverse processes, the pars interarticularis and the facet joint and old fusion mass. Screws measuring 6.5-7.5 mm in diameter, 40-45 mm in length with good purchase. These were connected with a analilia of the appropriate contour, secured using locking cap and final tightening device. Slight amount of distraction on the left side at L3-4. Pedicles were reexplored as well as the nerve roots. No evidence of nerve root compression. Wound had been irrigated earlier. Drain was placed. Fascia closed using #1 Vicryl. Subcutaneous tissues and skin closed using 2-0 and 3-0 Vicryl. Patient awoken from the anesthetic, extubated and taken to recovery in stable condition.       Carlos Dickerson MD       [facility] [place]  D: 08/29/2018 15:12     T: 08/29/2018 18:04  JOB #: [id number]

## 2018-08-29 NOTE — ANESTHESIA PREPROCEDURE EVALUATION
Anesthetic History No history of anesthetic complications Review of Systems / Medical History Patient summary reviewed, nursing notes reviewed and pertinent labs reviewed Pulmonary Asthma Neuro/Psych CVA Cardiovascular Within defined limits GI/Hepatic/Renal 
Within defined limits Endo/Other Hypothyroidism Arthritis Other Findings Physical Exam 
 
Airway Mallampati: II 
TM Distance: > 6 cm Neck ROM: normal range of motion Mouth opening: Normal 
 
 Cardiovascular Regular rate and rhythm,  S1 and S2 normal,  no murmur, click, rub, or gallop Dental 
No notable dental hx Pulmonary Breath sounds clear to auscultation Abdominal 
GI exam deferred Other Findings Anesthetic Plan ASA: 3 Anesthesia type: general 
 
 
 
 
Induction: Intravenous Anesthetic plan and risks discussed with: Patient

## 2018-08-29 NOTE — PROGRESS NOTES
TRANSFER - IN REPORT:    Verbal report received from [employer], RN(name) on Alma Padilla  being received from [facility]) for routine post - op      Report consisted of patients Situation, Background, Assessment and   Recommendations(SBAR). Information from the following report(s) SBAR was reviewed with the receiving nurse. Opportunity for questions and clarification was provided. Assessment completed upon patients arrival to unit and care assumed.    Primary Nurse Omar Villanueva RN and Vera RN performed a dual skin assessment on this patient No impairment noted  Alfonso score is 20

## 2018-08-29 NOTE — IP AVS SNAPSHOT
[address] 
[phone] Patient: Neno Mayberry MRN: [id number] TSI:[id number] About your hospitalization You were admitted on:  August 29, 2018 You last received care in the:  [address] [place] [address] You were discharged on:  September 1, 2018 Why you were hospitalized Your primary diagnosis was:  Not on File Your diagnoses also included:  Spinal Stenosis Of Lumbar Region Follow-up Information Follow up With Details Comments Contact Info [address] [place] [address]   [phone] Basilio Box MD Call Please call the office upon arriving at home to schedule your first post-operative clinic evaluation for two-weeks. [facility] [address] [facility] [address] 
[phone] Cassidy Greer MD   [address] Vaibhav Stern [id number] [phone] Discharge Orders None A check iván indicates which time of day the medication should be taken. My Medications START taking these medications Instructions Each Dose to Equal  
 Morning Noon Evening Bedtime  
 oxyCODONE IR 5 mg immediate release tablet Commonly known as:  Belgica Rupinder Your last dose was: Your next dose is: Take 1 Tab by mouth every four (4) hours as needed. Max Daily Amount: 30 mg.  
 5 mg  
    
   
   
   
  
 senna-docusate 8.6-50 mg per tablet Commonly known as:  Hannah  Your last dose was: Your next dose is: Take 1 Tab by mouth daily. 1 Tab CONTINUE taking these medications Instructions Each Dose to Equal  
 Morning Noon Evening Bedtime Calcium-Cholecalciferol (D3) 500 mg(1,250mg) -400 unit Tab Your last dose was: Your next dose is: Take 1 Tab by mouth daily. 1 Tab  
    
   
   
   
  
 levothyroxine 100 mcg tablet Commonly known as:  SYNTHROID  
   
 Your last dose was: Your next dose is: Take 100 mcg by mouth Daily (before breakfast). 100 mcg MULTIVITAMIN PO Your last dose was: Your next dose is: Take 1 Tab by mouth daily. 1 Tab PROAIR HFA 90 mcg/actuation inhaler Generic drug:  albuterol Your last dose was: Your next dose is: Take 2 Puffs by inhalation every four (4) hours as needed for Wheezing. 2 Puff  
    
   
   
   
  
 raNITIdine 150 mg tablet Commonly known as:  ZANTAC Your last dose was: Your next dose is: Take 150 mg by mouth two (2) times a day. 150 mg  
    
   
   
   
  
 SINGULAIR 10 mg tablet Generic drug:  montelukast  
   
Your last dose was: Your next dose is: Take 10 mg by mouth daily. 10 mg  
    
   
   
   
  
 SPIRIVA WITH HANDIHALER 18 mcg inhalation capsule Generic drug:  tiotropium Your last dose was: Your next dose is: Take 1 Cap by inhalation daily. 1 Cap SYMBICORT 160-4.5 mcg/actuation Hfaa Generic drug:  budesonide-formoterol Your last dose was: Your next dose is: Take 2 Puffs by inhalation two (2) times a day. 2 Puff  
    
   
   
   
  
 tolterodine ER 4 mg ER capsule Commonly known as:  Maricruz Dunaway Your last dose was: Your next dose is: Take 4 mg by mouth daily. 4 mg STOP taking these medications   
 aspirin 81 mg chewable tablet Where to Get Your Medications [facility]  [facility], [address] [place] [address],[address] [id number] Phone:  [phone]  
  senna-docusate 8.6-50 mg per tablet Information on where to get these meds will be given to you by the nurse or doctor. ! Ask your nurse or doctor about these medications  
  oxyCODONE IR 5 mg immediate release tablet Opioid Education Prescription Opioids: What You Need to Know: 
 
 
Procedure (Procedure(s): REVISION LUMBAR LAMINECTOMY OF FUSION L3-S1) Surgeon (Surgeon(s) and Role: Araceli Tena MD - Primary) PCP: 
 
Follow up appointments 
-follow up with Dr. Nolberto Barker in 2 weeks. Call [phone], ext [phone] to make an appointment as soon as you get home from the hospital. 
 
[address] [place], [place] [address]y: _________________________   phone: _______________________ The agency will contact you to arrange dates/times for visits. Please call them if you do not hear from them within 24 hours after you are discharged When to call your Orthopaedic Surgeon: 
-Signs of infection-if your incision is red; continues to have drainage; drainage has a foul odor or if you have a persistent fever over 101 degrees for 24 hours 
-nausea or vomiting, severe headache 
-loss of bowel or bladder function, inability to urinate 
-changes in sensation in your arms or legs (numbness, tingling, loss of color) 
-increased weakness-greater than before your surgery 
-severe pain or pain not relieved by medications 
-Signs of a blood clot in your leg-calf pain, tenderness, redness, swelling of lower leg When to call your Primary Care Physician: 
-Concerns about medical conditions such as diabetes, high blood pressure, asthma, congestive heart failure -Call if blood sugars are elevated, persistent headache or dizziness, coughing or congestion, constipation or diarrhea, burning with urination, abnormal heart rate When to call 911and go to the nearest emergency room 
-acute onset of chest pain, shortness of breath, difficulty breathing Activity - Your only exercise should be walking. Start with short frequent walks and increase your walking distance each day. 
-Limit the amount of time you sit to 20-30 minute intervals. Sitting for prolonged periods of time will be uncomfortable for you following surgery. 
-Do NOT lift anything over 5 pounds 
-Do NOT do any straining, twisting or bending 
-When you are in bed, you may lay on your back or on either side. Do NOT lie on your stomach Brace  if ordered by surgeon  
-If you have a back brace, you should wear your brace at all times when you are out of bed. Do not wear the brace while in bed or showering. 
-Remember to always wear a cotton t-shirt underneath your brace. 
-Do not bend or twist when your brace is off Driving 
-You may not drive or return to work until instructed by your physician. However, you may ride in the car for short periods of time. Incision Care Your incision has been closed with absorbable sutures and the Dermabond Prineo skin closure system. This is a combination of a mesh and a liquid adhesive that will assist with healing. The mesh is to remain on your incision for 2 weeks. A dry dressing (ABD and tape) will be placed over it and should be changed daily. Please make sure the person performing your dressing changes washes their hands before touching the dressing. You may take brief showers but do not run the water directly onto the wound. After your shower, remove your dressing and blot your incision dry with a clean, soft towel and replace the dry dressing. Do not allow the tape to come in contact with the mesh. Do not rub or apply any lotions or ointments to your incision site. Do not soak or scrub your wound. The mesh dressing will be removed during your two week follow-up appointment. If you experience drainage leaking from underneath the mesh or if it peels off before 2 weeks, please contact your orthopedic surgeons office. Showering 
-You may shower in approximately 4 days after your surgery.   
 
-Leave the dressing on during your shower without allowing the water to run over it. 
 
-Reminder- your brace can be removed while showering. Remember to not bend or twist while your brace is off.   
 
-Do not take a tub bath. Preventing blood clots 
-You have been given T.E.D. stockings to wear. Continue to wear these for 7 days after your discharge. Put them on in the morning and take them off at night.   
 
-They are used to increase your circulation and prevent blood clots from forming in your legs Pain management 
-take pain medication as prescribed; decrease the amount you use as your pain lessens 
-avoid alcoholic beverages while taking pain medication 
-avoid NSAIDS (Aleve, Ibuprofen, Aspirin) until your surgeon approves it 
-Please be aware that many medications contain Tylenol. We do not want you to over medicate so please read the information below as a guide. Do not take more than 4 Grams of Tylenol in a 24 hour period. (There are 1000 milligrams in one Gram) Percocet contains 325 mg of Tylenol per tablet (do not take more than 12 tablets in 24 hours) Lortab contains 500 mg of Tylenol per tablet (do not take more than 8 tablets in 24 hours) Norco contains 325 mg of Tylenol per tablet (do not take more than 12 tablets in 24 hours). Diet 
-resume usual diet; drink plenty of fluids; eat foods high in fiber 
-It is important to have regular bowel movements. Pain medications may cause constipation. You may want to take a stool softener (such as Senokot-S or Colace) to prevent constipation. If constipation occurs, take a laxative (such as Dulcolax tablets, Milk of Magnesia, or a suppository). Laxatives should only be used if the above preventable measures have failed and you still have not had a bowel movement after three days Nightingale Announcement We are excited to announce that we are making your provider's discharge notes available to you in Nightingale. You will see these notes when they are completed and signed by the physician that discharged you from your recent hospital stay. If you have any questions or concerns about any information you see in Nightingale, please call the Health Information Department where you were seen or reach out to your Primary Care Provider for more information about your plan of care. Introducing hospitals & HEALTH SERVICES! [address] introduces Nightingale patient portal. Now you can access parts of your medical record, email your doctor's office, and request medication refills online. 1. In your internet browser, go to [email or web address] 2. Click on the First Time User? Click Here link in the Sign In box. You will see the New Member Sign Up page. 3. Enter your Nightingale Access Code exactly as it appears below. You will not need to use this code after youve completed the sign-up process. If you do not sign up before the expiration date, you must request a new code. · Nightingale Access Code: [email or web address] Expires: 10/10/2018 12:28 PM 
 
4. Enter the last four digits of your Social Security Number (xxxx) and Date of Birth (mm/dd/yyyy) as indicated and click Submit. You will be taken to the next sign-up page. 5. Create a Nightingale ID. This will be your Nightingale login ID and cannot be changed, so think of one that is secure and easy to remember. 6. Create a Nightingale password. You can change your password at any time. 7. Enter your Password Reset Question and Answer. This can be used at a later time if you forget your password. 8. Enter your e-mail address. You will receive e-mail notification when new information is available in [address] 9. Click Sign Up. You can now view and download portions of your medical record. 10. Click the Download Summary menu link to download a portable copy of your medical information. If you have questions, please visit the Frequently Asked Questions section of the [employer] website. Remember, [employer] is NOT to be used for urgent needs. For medical emergencies, dial 911. Now available from your iPhone and Android! Introducing Wilman Davis As a [facility] patient, I wanted to make you aware of our electronic visit tool called Wilman Davis. [employer] 24/7 allows you to connect within minutes with a medical provider 24 hours a day, seven days a week via a mobile device or tablet or logging into a secure website from your computer. You can access Wilman Davis from anywhere in the [place]. A virtual visit might be right for you when you have a simple condition and feel like you just dont want to get out of bed, or cant get away from work for an appointment, when your regular [facility] provider is not available (evenings, weekends or holidays), or when youre out of town and need minor care. Electronic visits cost only $49 and if the [facility] 24/7 provider determines a prescription is needed to treat your condition, one can be electronically transmitted to a nearby pharmacy*. Please take a moment to enroll today if you have not already done so. The enrollment process is free and takes just a few minutes. To enroll, please download the [employer] 24/7 brian to your tablet or phone, or visit [email or web address] to enroll on your computer.    
And, as an [address] [place] [address] patient with a [employer] account, the results of your visits will be scanned into your electronic medical record and your primary care provider will be able to view the scanned results. We urge you to continue to see your regular [facility] provider for your ongoing medical care. And while your primary care provider may not be the one available when you seek a Wilman Ellisonchikafin virtual visit, the peace of mind you get from getting a real diagnosis real time can be priceless. For more information on Wilman [email or web address], view our Frequently Asked Questions (FAQs) at [email or web address]. Sincerely, 
 
Maite Doss MD 
Chief Medical Officer [employer] *:  certain medications cannot be prescribed via Wilman Scotradha Providers Seen During Your Hospitalization Provider Specialty Primary office phone Morro Kraft MD Orthopedic Surgery [phone] Your Primary Care Physician (PCP) Primary Care Physician Office Phone Office Fax Hansrolando Martha [phone] You are allergic to the following Allergen Reactions Ceclor (Cefaclor) Hives Recent Documentation Height Weight Breastfeeding? BMI OB Status Smoking Status 1.549 m 63.5 kg No 26.45 kg/m2 Hysterectomy Former Smoker Emergency Contacts Name Discharge Info Relation Home Work Mobile [address] Fallonar CAREGIVER [3] Spouse [3] [phone] Patient Belongings The following personal items are in your possession at time of discharge: 
  Dental Appliances: None  Visual Aid: None   Hearing Aids/Status: Does not own         Clothing:  (belongings sent to pacu) Please provide this summary of care documentation to your next provider. Signatures-by signing, you are acknowledging that this After Visit Summary has been reviewed with you and you have received a copy. Patient Signature:  ____________________________________________________________ Date:  ____________________________________________________________  
  
Althia Kras  Provider Signature: ____________________________________________________________ Date:  ____________________________________________________________

## 2018-08-30 LAB
ANION GAP SERPL CALC-SCNC: 10 MMOL/L (ref 5–15)
BUN SERPL-MCNC: 10 MG/DL (ref 6–20)
BUN/CREAT SERPL: 14 (ref 12–20)
CALCIUM SERPL-MCNC: 8 MG/DL (ref 8.5–10.1)
CHLORIDE SERPL-SCNC: 107 MMOL/L (ref 97–108)
CO2 SERPL-SCNC: 24 MMOL/L (ref 21–32)
CREAT SERPL-MCNC: 0.69 MG/DL (ref 0.55–1.02)
GLUCOSE SERPL-MCNC: 112 MG/DL (ref 65–100)
HGB BLD-MCNC: 10.9 G/DL (ref 11.5–16)
POTASSIUM SERPL-SCNC: 4.4 MMOL/L (ref 3.5–5.1)
SODIUM SERPL-SCNC: 141 MMOL/L (ref 136–145)

## 2018-08-30 PROCEDURE — 94640 AIRWAY INHALATION TREATMENT: CPT

## 2018-08-30 PROCEDURE — 97535 SELF CARE MNGMENT TRAINING: CPT

## 2018-08-30 PROCEDURE — 74011250637 HC RX REV CODE- 250/637: Performed by: PHYSICIAN ASSISTANT

## 2018-08-30 PROCEDURE — 97161 PT EVAL LOW COMPLEX 20 MIN: CPT

## 2018-08-30 PROCEDURE — 74011000250 HC RX REV CODE- 250: Performed by: PHYSICIAN ASSISTANT

## 2018-08-30 PROCEDURE — 36415 COLL VENOUS BLD VENIPUNCTURE: CPT | Performed by: PHYSICIAN ASSISTANT

## 2018-08-30 PROCEDURE — 65270000032 HC RM SEMIPRIVATE

## 2018-08-30 PROCEDURE — 80048 BASIC METABOLIC PNL TOTAL CA: CPT | Performed by: PHYSICIAN ASSISTANT

## 2018-08-30 PROCEDURE — 97116 GAIT TRAINING THERAPY: CPT

## 2018-08-30 PROCEDURE — 74011250636 HC RX REV CODE- 250/636: Performed by: PHYSICIAN ASSISTANT

## 2018-08-30 PROCEDURE — 97165 OT EVAL LOW COMPLEX 30 MIN: CPT

## 2018-08-30 PROCEDURE — 85018 HEMOGLOBIN: CPT | Performed by: PHYSICIAN ASSISTANT

## 2018-08-30 PROCEDURE — 74011000250 HC RX REV CODE- 250: Performed by: NURSE PRACTITIONER

## 2018-08-30 RX ORDER — IPRATROPIUM BROMIDE 0.5 MG/2.5ML
0.5 SOLUTION RESPIRATORY (INHALATION) EVERY 6 HOURS
Status: DISCONTINUED | OUTPATIENT
Start: 2018-08-30 | End: 2018-09-01 | Stop reason: HOSPADM

## 2018-08-30 RX ADMIN — OXYBUTYNIN CHLORIDE 5 MG: 5 TABLET, EXTENDED RELEASE ORAL at 10:00

## 2018-08-30 RX ADMIN — OXYCODONE HYDROCHLORIDE 10 MG: 5 TABLET ORAL at 10:00

## 2018-08-30 RX ADMIN — FAMOTIDINE 20 MG: 20 TABLET ORAL at 10:00

## 2018-08-30 RX ADMIN — Medication 10 ML: at 05:47

## 2018-08-30 RX ADMIN — MONTELUKAST SODIUM 10 MG: 10 TABLET, FILM COATED ORAL at 10:00

## 2018-08-30 RX ADMIN — OXYCODONE HYDROCHLORIDE 10 MG: 5 TABLET ORAL at 21:34

## 2018-08-30 RX ADMIN — LEVOTHYROXINE SODIUM 100 MCG: 100 TABLET ORAL at 06:47

## 2018-08-30 RX ADMIN — Medication 2 G: at 05:47

## 2018-08-30 RX ADMIN — DIAZEPAM 5 MG: 5 TABLET ORAL at 04:02

## 2018-08-30 RX ADMIN — BUDESONIDE 500 MCG: 0.5 INHALANT RESPIRATORY (INHALATION) at 08:48

## 2018-08-30 RX ADMIN — Medication 10 ML: at 21:35

## 2018-08-30 RX ADMIN — OXYCODONE HYDROCHLORIDE 10 MG: 5 TABLET ORAL at 14:52

## 2018-08-30 RX ADMIN — Medication 1 TABLET: at 18:24

## 2018-08-30 RX ADMIN — IPRATROPIUM BROMIDE 0.5 MG: 0.5 SOLUTION RESPIRATORY (INHALATION) at 15:13

## 2018-08-30 RX ADMIN — ARFORMOTEROL TARTRATE 15 MCG: 15 SOLUTION RESPIRATORY (INHALATION) at 08:48

## 2018-08-30 RX ADMIN — ACETAMINOPHEN 650 MG: 325 TABLET ORAL at 18:25

## 2018-08-30 RX ADMIN — ACETAMINOPHEN 650 MG: 325 TABLET ORAL at 05:47

## 2018-08-30 RX ADMIN — ACETAMINOPHEN 650 MG: 325 TABLET ORAL at 14:55

## 2018-08-30 RX ADMIN — OXYCODONE HYDROCHLORIDE 10 MG: 5 TABLET ORAL at 06:47

## 2018-08-30 RX ADMIN — FAMOTIDINE 20 MG: 20 TABLET ORAL at 18:25

## 2018-08-30 RX ADMIN — DIAZEPAM 5 MG: 5 TABLET ORAL at 21:41

## 2018-08-30 RX ADMIN — Medication 1 TABLET: at 10:02

## 2018-08-30 RX ADMIN — Medication 10 ML: at 18:26

## 2018-08-30 NOTE — PROGRESS NOTES
Care Management Interventions  PCP Verified by CM: Yes  Palliative Care Criteria Met (RRAT>21 & CHF Dx)?: No  Transition of Care Consult (CM Consult): Discharge Planning  MyChart Signup: No  Discharge Durable Medical Equipment: No  Physical Therapy Consult: Yes  Occupational Therapy Consult: Yes  Speech Therapy Consult: No  Current Support Network: Lives with Spouse  Confirm Follow Up Transport: Family  Plan discussed with Pt/Family/Caregiver: Yes  Freedom of Choice Offered: Yes  Phenix Resource Information Provided?: No  Discharge Location  Discharge Placement: Home with family assistance    Reason for Admission:   REVISION LUMBAR LAMINECTOMY OF FUSION L3-S1                  RRAT Score:  18                Do you (patient/family) have any concerns for transition/discharge? None                 Plan for utilizing home health: To be determined, most likely will not need it    Likelihood of readmission?   low            Transition of Care Plan:      Chart reviewed for transitions of care, discussed patient during rounds. Patient is working with therapy and their current recommendations are home health vs none. Patient will return home with her  and daughters' support. Cm will follow for any needs.   [employer] , [place]

## 2018-08-30 NOTE — PROGRESS NOTES
ORTHOPAEDIC LUMBAR FUSION PROGRESS NOTE    NAME: Jame Matute   :  1940   MRN:  [id number]   DATE:  2018    POD: 1 Day Post-Op  S/P: Procedure(s):  REVISION LUMBAR LAMINECTOMY OF FUSION L3-S1    SUBJECTIVE:    Patient found awake/oriented, c/o pain low lumbar along surgical incision -denies leg pain/numbness. Afebrile/VSS. Denies nausea/vomiting, chest pain, headache or shortness of breath. Discussed working with PT/OT today, OOB along with pain management. D/c planning pending overall evaluation by PT/OT. Recent Labs      18   0410   HGB  10.9*   NA  141   K  4.4   CL  107   CO2  24   BUN  10   CREA  0.69   GLU  112*     Patient Vitals for the past 12 hrs:   BP Temp Pulse Resp SpO2   18 0852 117/55 97.8 °F (36.6 °C) 88 16 93 %   18 0848 - - - - 93 %   18 0355 116/66 97.4 °F (36.3 °C) 62 16 98 %   18 2338 113/65 97.3 °F (36.3 °C) 63 16 92 %   18 2157 - - - - 99 %     Drain: 125 ml    EXAM:  Positive strength/ROM bilat lower ext.   Neuro intact  Dressings clean, dry and intact     PLAN:  D/C PCA, change to PO pain medications  PT/OT, OOB  Advance regular diet  Ice/gel packs to back for  PRN pain  Pain Control  D/c planning pending evaluation by PT/OT    Casey Mayers PA-C

## 2018-08-30 NOTE — PROGRESS NOTES
Rounded on [facility]ast[facility] patients and provided Anointing of the Sick at request of patient    Fr. Clementine Kaur

## 2018-08-30 NOTE — PROGRESS NOTES
Problem: Mobility Impaired (Adult and Pediatric)  Goal: *Acute Goals and Plan of Care (Insert Text)  Physical Therapy Goals  Initiated 8/30/2018    1. Patient will move from supine to sit and sit to supine  and roll side to side in bed with independence within 4 days. 2. Patient will perform sit to stand with independence within 4 days. 3. Patient will ambulate with independence for 300 feet with the least restrictive device within 4 days. 4. Patient will ascend/descend 4 stairs with 1 handrail(s) with independence within 4 days. 5. Patient will verbalize and demonstrate understanding of spinal precautions (No bending, lifting greater than 5 lbs, or twisting; log-roll technique; frequent repositioning as instructed) within 4 days. physical Therapy TREATMENT  Patient: Alejandro Andrade (10 y.o. female)  Date: 8/30/2018  Precautions:    Chart, physical therapy assessment, plan of care and goals were reviewed. ASSESSMENT:  Pt continuing to make steady progress towards goals. No safety concerns noted this session and pt was able to maintain all precautions throughout session. Donned brace in standing against the wall with Min A. Ambulated 350ft and completed 4 steps with R railing this afternoon.  present and very supportive. Pt does display occasional softening of R knee during gait but recovers well. Reports overall the R side is more painful post-surgery. Would recommend HHPT at discharge for continued strengthening and balance training and pt agrees. Progression toward goals:  [x]      Improving appropriately and progressing toward goals  []      Improving slowly and progressing toward goals  []      Not making progress toward goals and plan of care will be adjusted     PLAN:  Patient continues to benefit from skilled intervention to address the above impairments. Continue treatment per established plan of care.   Discharge Recommendations:  Home Health  Further Equipment Recommendations for Discharge:  None     SUBJECTIVE:   Patient stated I think my leg pain has switched since before surgery.    The patient stated 3/3 back precautions. Reviewed all 3 with patient. OBJECTIVE DATA SUMMARY:   Functional Mobility Training:  Bed Mobility:  Log    Supine to Sit: Contact guard assistance  Sit to Supine: Contact guard assistance  Scooting: Modified independent        Brace donned with  minimal assistance/contact guard assist   Transfers:  Sit to Stand: Contact guard assistance  Stand to Sit: Contact guard assistance                             Ambulation/Gait Training:  Distance (ft): 350 Feet (ft)  Assistive Device: Brace/Splint  Ambulation - Level of Assistance: Contact guard assistance        Gait Abnormalities: Decreased step clearance        Base of Support: Narrowed     Speed/Kelsey: Pace decreased (<100 feet/min)  Step Length: Right shortened;Left shortened                    Stairs:  Number of Stairs Trained: 4  Stairs - Level of Assistance: Contact guard assistance  Rail Use: Right   Therapeutic Exercises:     Pain:  Pain Scale 1: Numeric (0 - 10)  Pain Intensity 1: 2              Activity Tolerance:   Good  Please refer to the flowsheet for vital signs taken during this treatment.   After treatment:   [x]  Patient left in no apparent distress sitting up in chair  []  Patient left in no apparent distress in bed  [x]  Call bell left within reach  [x]  Nursing notified  []  Caregiver present  []  Bed alarm activated    COMMUNICATION/COLLABORATION:   The patients plan of care was discussed with: Registered Nurse    Adriano Gibbs, PT   Time Calculation: 17 mins

## 2018-08-30 NOTE — PROGRESS NOTES
Occupational Therapy EVALUATION/discharge  Patient: Frida Holcomb (46 y.o. female)  Date: 8/30/2018  Primary Diagnosis: SPINAL STENOSIS L3-4, STATUS POST SPINAL FUSION L4-S1  Spinal stenosis of lumbar region  Procedure(s) (LRB):  REVISION LUMBAR LAMINECTOMY OF FUSION L3-S1 (N/A) 1 Day Post-Op   Precautions: Back      ASSESSMENT:   Based on the objective data described below, the patient presents with overall Mod I-Mod A for upper body ADLs, Mod I for lower body ADLs, and SBA-SPV for functional mobility s/p revision lumbar laminectomy & fusion. PTA, patient IND-Mod I, living with spouse, hx of spinal surgery and recent R ISATU. Patient completing functional mobility, grooming at the sink, & toileting with Mod I-SBA. Educated on ADL modifications, energy conservation, home safety, and car transfers, patient and  acknowledging understanding, handouts provided. Spouse works during the day, however can set patient up with all needs, daughter will be in throughout the day to assist PRN. Patient with no further acute OT needs, safe to d/c home with family support from OT standpoint. Further skilled acute occupational therapy is not indicated at this time. Discharge Recommendations: None  Further Equipment Recommendations for Discharge: None (has lower body AE needed)      SUBJECTIVE:   Patient stated My right hip is a little tight, but it's moving and the doctor told me it would take a year to feel normal again.     OBJECTIVE DATA SUMMARY:   HISTORY:   Past Medical History:   Diagnosis Date    Acid reflux     Acoustic neuroma ([facility] [address] 05/2012    GAMMA KNIFE DR Bibiana Garcia    Chronic obstructive pulmonary disease (HCC)     Chronic pain     left  leg    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  Hypothyroid     Other ill-defined conditions(799.89)     cavinous sinus thrombosis    Shingles 2014    Stroke ([address] 08/2012    CAVERNOUS SINUS THROMBOSIS; RIGHT HAND WEAKNESS.      Past Surgical History:   Procedure Laterality Date    HC SLNG OBTURATOR SYS TVT GYNECA  2008    HX CATARACT REMOVAL  2014    HX CERVICAL FUSION  2011    C5-7    HX CHOLECYSTECTOMY  2010    HX COLONOSCOPY  2016    HX HEART CATHETERIZATION  2016    NEGATIVE PER PATIENT    HX HEENT Left 2017    BONE INDUCTION FOR HEARING AID    HX HYSTERECTOMY  1976    HX LUMBAR FUSION  2014    HX ORTHOPAEDIC  2006    TRIGGER FINGER    HX ORTHOPAEDIC Right 04/2018    HIP REPLACEMENT (DR SOLOMON)    HX OTHER SURGICAL  2012    GAMMA KNIFE RADIATION    HX REFRACTIVE SURGERY  2003    HX TONSILLECTOMY  1948    CT COMBINED ANT/POST COLPORRHAPHY  2008    CT REMOVAL OF ANAL FISSURE  1965       Prior Level of Function/Environment/Context: IND-Mod I, recent R ISATU, has some AE.  will assist when home from work, daughter will come throughout the day as able. Home Situation  Home Environment: Private residence  # Steps to Enter: 4  One/Two Story Residence: Two story, live on 1st floor  Interior Rails: None  Lift Chair Available: No  Living Alone: No  Support Systems: Spouse/Significant Other/Partner  Patient Expects to be Discharged to[de-identified] Private residence  Current DME Used/Available at Home: Commode, bedside, Adaptive dressing aides, Grab bars (reacher, LH shoe horn)  Tub or Shower Type: Tub/Shower combination    Hand dominance: Right    EXAMINATION OF PERFORMANCE DEFICITS:  Cognitive/Behavioral Status:  Neurologic State: Alert  Orientation Level: Oriented X4  Cognition: Appropriate for age attention/concentration; Appropriate decision making; Appropriate safety awareness; Follows commands  Perception: Appears intact  Perseveration: No perseveration noted  Safety/Judgement: Awareness of environment; Fall prevention    Skin: Appears intact    Edema: None noted in BUEs    Hearing:   Auditory  Auditory Impairment: Hard of hearing, left side  Hearing Aids/Status: Does not own    Vision/Perceptual:    Tracking: Able to track stimulus in all quadrants w/o difficulty                      Acuity: Within Defined Limits         Range of Motion:  In BUEs  AROM: Within functional limits                         Strength: In BUEs  Strength: Within functional limits                Coordination:  Coordination: Within functional limits  Fine Motor Skills-Upper: Left Intact; Right Intact    Gross Motor Skills-Upper: Left Intact; Right Intact    Tone & Sensation:  In BUEs  Tone: Normal                         Balance:  Sitting: Intact  Standing: Intact  Standing - Static: Good  Standing - Dynamic : Good    Functional Mobility and Transfers for ADLs:  Bed Mobility:  Supine to Sit:  (received up in the chair)  Sit to Supine: Contact guard assistance  Scooting: Modified independent    Transfers:  Sit to Stand: Stand-by assistance  Stand to Sit: Stand-by assistance  Toilet Transfer : Stand-by assistance    ADL Assessment:  Feeding: Independent    Oral Facial Hygiene/Grooming: Modified Independent (standing at the sink)    Bathing: Stand-by assistance    Upper Body Dressing: Moderate assistance (including TLSO,  & daughter will assist)    Lower Body Dressing: Stand-by assistance    Toileting: Modified independent                ADL Intervention and task modifications:       Grooming  Grooming Assistance: Modified independent  Washing Face: Independent  Washing Hands: Independent  Brushing Teeth: Modified independent; Compensatory technique training  Cues: Verbal cues provided;Visual cues provided              Upper Body Dressing Assistance  Orthotics(Brace):  (donned throughout session, /dtr will assist PRN)    Lower Body Dressing Assistance  Dressing Assistance: Modified independent  Underpants: Compensatory technique training  Pants With Elastic Waist: Compensatory technique training  Socks: Modified independent; Compensatory technique training  Slip on Shoes Without Back: Compensatory technique training  Leg Crossed Method Used: Yes  Position Performed: Seated in chair  Cues: Verbal cues provided;Visual cues provided    Toileting  Toileting Assistance: Supervision/set up (cues for spinal precautions and modifications for prxns)  Bladder Hygiene: Supervision/set-up; Compensatory technique training  Bowel Hygiene: Supervision/set-up; Compensatory technique training  Clothing Management: Supervision/set-up  Cues: Verbal cues provided;Visual cues provided  Adaptive Equipment: Grab bars    Cognitive Retraining  Safety/Judgement: Awareness of environment; Fall prevention    Therapeutic Exercise:  - Ambulation in room, standing at the sink x5 min     Functional Measure:  Barthel Index:    Bathin  Bladder: 10  Bowels: 10  Groomin  Dressing: 10  Feeding: 10  Mobility: 10  Stairs: 5  Toilet Use: 10  Transfer (Bed to Chair and Back): 10  Total: 85       Barthel and G-code impairment scale:  Percentage of impairment CH  0% CI  1-19% CJ  20-39% CK  40-59% CL  60-79% CM  80-99% CN  100%   Barthel Score 0-100 100 99-80 79-60 59-40 20-39 1-19   0   Barthel Score 0-20 20 17-19 13-16 9-12 5-8 1-4 0      The Barthel ADL Index: Guidelines  1. The index should be used as a record of what a patient does, not as a record of what a patient could do. 2. The main aim is to establish degree of independence from any help, physical or verbal, however minor and for whatever reason. 3. The need for supervision renders the patient not independent. 4. A patient's performance should be established using the best available evidence. Asking the patient, friends/relatives and nurses are the usual sources, but direct observation and common sense are also important. However direct testing is not needed. 5. Usually the patient's performance over the preceding 24-48 hours is important, but occasionally longer periods will be relevant. 6. Middle categories imply that the patient supplies over 50 per cent of the effort. 7. Use of aids to be independent is allowed.     Raymond Gerard., Barthel, DAshuW. (1965). Functional evaluation: the Barthel Index. [address] [facility] (14)2. ELAINE Silva, Robby Marshall.Genaro., Delia Harada, [address] (1999). Measuring the change indisability after inpatient rehabilitation; comparison of the responsiveness of the Barthel Index and Functional Ballard Measure. Journal of Neurology, Neurosurgery, and Psychiatry, 66(4), 118-851. KEZIA Portillo, JAI Baumann, & Christopher Starks M.A. (2004.) Assessment of post-stroke quality of life in cost-effectiveness studies: The usefulness of the Barthel Index and the EuroQoL-5D. Quality of Life Research, 13, 184-76         G codes: In compliance with CMSs Claims Based Outcome Reporting, the following G-code set was chosen for this patient based on their primary functional limitation being treated: The outcome measure chosen to determine the severity of the functional limitation was the Barthel Index with a score of 85/100 which was correlated with the impairment scale. ?  Self Care:     - CURRENT STATUS: CI - 1%-19% impaired, limited or restricted    - GOAL STATUS: CI - 1%-19% impaired, limited or restricted    - D/C STATUS:  CI - 1%-19% impaired, limited or restricted     Occupational Therapy Evaluation Charge Determination   History Examination Decision-Making   LOW Complexity : Brief history review  LOW Complexity : 1-3 performance deficits relating to physical, cognitive , or psychosocial skils that result in activity limitations and / or participation restrictions  LOW Complexity : No comorbidities that affect functional and no verbal or physical assistance needed to complete eval tasks       Based on the above components, the patient evaluation is determined to be of the following complexity level: LOW   Pain:  Pain Scale 1: Numeric (0 - 10)  Pain Intensity 1: 2              Activity Tolerance:   Good    Please refer to the flowsheet for vital signs taken during this treatment. After treatment:   [x]  Patient left in no apparent distress sitting up in chair  []  Patient left in no apparent distress in bed  [x]  Call bell left within reach  [x]  Nursing notified  [x]  Caregiver present  []  Bed alarm activated    COMMUNICATION/EDUCATION:   Communication/Collaboration:  [x]      Home safety education was provided and the patient/caregiver indicated understanding. [x]      Patient/family have participated as able and agree with findings and recommendations. []      Patient is unable to participate in plan of care at this time.   Findings and recommendations were discussed with: Physical Therapist, Registered Nurse and     Sonu Husain, OT  Time Calculation: 34 mins

## 2018-08-30 NOTE — PROGRESS NOTES
Problem: Mobility Impaired (Adult and Pediatric)  Goal: *Acute Goals and Plan of Care (Insert Text)  Physical Therapy Goals  Initiated 8/30/2018    1. Patient will move from supine to sit and sit to supine  and roll side to side in bed with independence within 4 days. 2. Patient will perform sit to stand with independence within 4 days. 3. Patient will ambulate with independence for 300 feet with the least restrictive device within 4 days. 4. Patient will ascend/descend 4 stairs with 1 handrail(s) with independence within 4 days. 5. Patient will verbalize and demonstrate understanding of spinal precautions (No bending, lifting greater than 5 lbs, or twisting; log-roll technique; frequent repositioning as instructed) within 4 days. physical Therapy EVALUATION  Patient: Vickey  (74 y.o. female)  Date: 8/30/2018  Primary Diagnosis: SPINAL STENOSIS L3-4, STATUS POST SPINAL FUSION L4-S1  Spinal stenosis of lumbar region  Procedure(s) (LRB):  REVISION LUMBAR LAMINECTOMY OF FUSION L3-S1 (N/A) 1 Day Post-Op   Precautions:        ASSESSMENT :  Based on the objective data described below, the patient presents with back precautions, mild back pain, impaired balance and functional mobility below baseline following L3-S1 laminectomy and fusion POD#1. PTA pt was independent with mobility without AD and living with her  in a two story house. Pt tolerated session well. Receptive to education on log roll, back precautions and brace wear. CGA for bed mobility, transfers, and gait training with support of IV pole. No LOB, buckling or reports of increased pain with activity. Left up in chair with  present and all needs within reach. Anticipate steady progress with therapy and HHPT vs none upon discharge. Patient will benefit from skilled intervention to address the above impairments.   Patients rehabilitation potential is considered to be Excellent  Factors which may influence rehabilitation potential include:   [x]         None noted  []         Mental ability/status  []         Medical condition  []         Home/family situation and support systems  []         Safety awareness  []         Pain tolerance/management  []         Other:      PLAN :  Recommendations and Planned Interventions:  [x]           Bed Mobility Training             [x]    Neuromuscular Re-Education  [x]           Transfer Training                   []    Orthotic/Prosthetic Training  [x]           Gait Training                         []    Modalities  [x]           Therapeutic Exercises           []    Edema Management/Control  [x]           Therapeutic Activities            [x]    Patient and Family Training/Education  []           Other (comment):    Frequency/Duration: Patient will be followed by physical therapy  twice daily to address goals. Discharge Recommendations: Home Health and None  Further Equipment Recommendations for Discharge: None     SUBJECTIVE:   Patient stated I am so surprised I don;t need a walker.     OBJECTIVE DATA SUMMARY:   HISTORY:    Past Medical History:   Diagnosis Date    Acid reflux     Acoustic neuroma ([facility] [address] 05/2012    GAMMA KNIFE DR Mesfin Stewart    Chronic obstructive pulmonary disease (HCC)     Chronic pain     left  leg    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  Hypothyroid     Other ill-defined conditions(799.89)     cavinous sinus thrombosis    Shingles 2014    Stroke ([facility] [address] 08/2012    CAVERNOUS SINUS THROMBOSIS; RIGHT HAND WEAKNESS.      Past Surgical History:   Procedure Laterality Date    HC SLNG OBTURATOR SYS TVT GYNECA  2008    HX CATARACT REMOVAL  2014    HX CERVICAL FUSION  2011    C5-7    HX CHOLECYSTECTOMY  2010    HX COLONOSCOPY  2016    HX HEART CATHETERIZATION  2016    NEGATIVE PER PATIENT    HX HEENT Left 2017    BONE INDUCTION FOR HEARING AID    HX HYSTERECTOMY  1976    HX LUMBAR FUSION  2014    HX ORTHOPAEDIC  2006    TRIGGER FINGER    HX ORTHOPAEDIC Right 04/2018    HIP REPLACEMENT (DR SOLOMON)    HX OTHER SURGICAL  2012    GAMMA KNIFE RADIATION    HX REFRACTIVE SURGERY  2003    HX TONSILLECTOMY  1948    MO COMBINED ANT/POST COLPORRHAPHY  2008    MO REMOVAL OF ANAL FISSURE  1965     Prior Level of Function/Home Situation: INDEPENDENT  Personal factors and/or comorbidities impacting plan of care: stroke, chronic pain    Home Situation  Home Environment: Private residence  # Steps to Enter: 4  One/Two Story Residence: Two story, live on 1st floor  Interior Rails: None  Lift Chair Available: No  Living Alone: No  Support Systems: Spouse/Significant Other/Partner  Patient Expects to be Discharged to[de-identified] Private residence  Current DME Used/Available at Home: Walker, Raised toilet seat    EXAMINATION/PRESENTATION/DECISION MAKING:   Critical Behavior:  Neurologic State: Alert           Hearing: Auditory  Auditory Impairment: Hard of hearing, left side  Hearing Aids/Status: Does not own  Skin:    Edema:   Range Of Motion:  AROM: Within functional limits                       Strength:    Strength: Within functional limits                    Tone & Sensation:   Tone: Normal                              Coordination:  Coordination: Within functional limits  Vision:      Functional Mobility:  Bed Mobility:     Supine to Sit: Contact guard assistance  Sit to Supine: Contact guard assistance     Transfers:  Sit to Stand: Contact guard assistance  Stand to Sit: Contact guard assistance                       Balance:   Sitting: Intact  Standing: Impaired  Standing - Static: Good  Standing - Dynamic : Fair  Ambulation/Gait Training:  Distance (ft): 300 Feet (ft)     Ambulation - Level of Assistance: Contact guard assistance        Gait Abnormalities: Decreased step clearance; Path deviations        Base of Support: Narrowed     Speed/Kelsey: Pace decreased (<100 feet/min); Shuffled  Step Length: Right shortened;Left shortened                     Stairs: Therapeutic Exercises:       Functional Measure:  Tinetti test:    Sitting Balance: 1  Arises: 1  Attempts to Rise: 2  Immediate Standing Balance: 2  Standing Balance: 1  Nudged: 1  Eyes Closed: 1  Turn 360 Degrees - Continuous/Discontinuous: 1  Turn 360 Degrees - Steady/Unsteady: 1  Sitting Down: 1  Balance Score: 12  Indication of Gait: 1  R Step Length/Height: 1  L Step Length/Height: 1  R Foot Clearance: 1  L Foot Clearance: 1  Step Symmetry: 1  Step Continuity: 1  Path: 1  Trunk: 0  Walking Time: 1  Gait Score: 9  Total Score: 21       Tinetti Test and G-code impairment scale:  Percentage of Impairment CH    0%   CI    1-19% CJ    20-39% CK    40-59% CL    60-79% CM    80-99% CN     100%   Tinetti  Score 0-28 28 23-27 17-22 12-16 6-11 1-5 0       Tinetti Tool Score Risk of Falls  <19 = High Fall Risk  19-24 = Moderate Fall Risk  25-28 = Low Fall Risk  Tinetti ME. Performance-Oriented Assessment of Mobility Problems in Elderly Patients. Rashid 66; [id number]. (Scoring Description: PT Bulletin Feb. 10, 1993)    Older adults: Brayan Ta et al, 2009; n = 1000 [facility] elderly evaluated with ABC, SONJA, ADL, and IADL)  · Mean SONJA score for males aged 69-68 years = 26.21(3.40)  · Mean SONJA score for females age 69-68 years = 25.16(4.30)  · Mean SONJA score for males over 80 years = 23.29(6.02)  · Mean SONJA score for females over 80 years = 17.20(8.32)         G codes: In compliance with CMSs Claims Based Outcome Reporting, the following G-code set was chosen for this patient based on their primary functional limitation being treated: The outcome measure chosen to determine the severity of the functional limitation was the Tinetti with a score of 21/28 which was correlated with the impairment scale.     ? Mobility - Walking and Moving Around:     - CURRENT STATUS: CJ - 20%-39% impaired, limited or restricted    - GOAL STATUS: CI - 1%-19% impaired, limited or restricted    - D/C STATUS:  ---------------To be determined---------------      Physical Therapy Evaluation Charge Determination   History Examination Presentation Decision-Making   MEDIUM  Complexity : 1-2 comorbidities / personal factors will impact the outcome/ POC  MEDIUM Complexity : 3 Standardized tests and measures addressing body structure, function, activity limitation and / or participation in recreation  LOW Complexity : Stable, uncomplicated  Other outcome measures Tinetti  LOW       Based on the above components, the patient evaluation is determined to be of the following complexity level: LOW     Pain:  Pain Scale 1: Numeric (0 - 10)  Pain Intensity 1: 2              Activity Tolerance:   Good  Please refer to the flowsheet for vital signs taken during this treatment. After treatment:   [x]         Patient left in no apparent distress sitting up in chair  []         Patient left in no apparent distress in bed  [x]         Call bell left within reach  [x]         Nursing notified  []         Caregiver present  []         Bed alarm activated    COMMUNICATION/EDUCATION:   The patients plan of care was discussed with: Registered Nurse. [x]         Fall prevention education was provided and the patient/caregiver indicated understanding. [x]         Patient/family have participated as able in goal setting and plan of care. [x]         Patient/family agree to work toward stated goals and plan of care. []         Patient understands intent and goals of therapy, but is neutral about his/her participation. []         Patient is unable to participate in goal setting and plan of care.     Thank you for this referral.  Virginie Guzman, PT   Time Calculation: 32 mins

## 2018-08-31 LAB — HGB BLD-MCNC: 9.9 G/DL (ref 11.5–16)

## 2018-08-31 PROCEDURE — 74011000250 HC RX REV CODE- 250: Performed by: NURSE PRACTITIONER

## 2018-08-31 PROCEDURE — 65270000032 HC RM SEMIPRIVATE

## 2018-08-31 PROCEDURE — 94760 N-INVAS EAR/PLS OXIMETRY 1: CPT

## 2018-08-31 PROCEDURE — 77010033678 HC OXYGEN DAILY

## 2018-08-31 PROCEDURE — 74011250637 HC RX REV CODE- 250/637: Performed by: PHYSICIAN ASSISTANT

## 2018-08-31 PROCEDURE — 85018 HEMOGLOBIN: CPT | Performed by: PHYSICIAN ASSISTANT

## 2018-08-31 PROCEDURE — 97116 GAIT TRAINING THERAPY: CPT

## 2018-08-31 PROCEDURE — 36415 COLL VENOUS BLD VENIPUNCTURE: CPT | Performed by: PHYSICIAN ASSISTANT

## 2018-08-31 PROCEDURE — 94762 N-INVAS EAR/PLS OXIMTRY CONT: CPT

## 2018-08-31 PROCEDURE — 74011250637 HC RX REV CODE- 250/637: Performed by: ORTHOPAEDIC SURGERY

## 2018-08-31 RX ORDER — ADHESIVE BANDAGE
30 BANDAGE TOPICAL ONCE
Status: COMPLETED | OUTPATIENT
Start: 2018-08-31 | End: 2018-08-31

## 2018-08-31 RX ORDER — AMOXICILLIN 250 MG
1 CAPSULE ORAL DAILY
Qty: 30 TAB | Refills: 0 | Status: SHIPPED | OUTPATIENT
Start: 2018-08-31 | End: 2022-10-16

## 2018-08-31 RX ORDER — OXYCODONE HYDROCHLORIDE 5 MG/1
5 TABLET ORAL
Qty: 50 TAB | Refills: 0 | Status: ON HOLD | OUTPATIENT
Start: 2018-08-31 | End: 2021-04-26

## 2018-08-31 RX ORDER — POLYETHYLENE GLYCOL 3350 17 G/17G
17 POWDER, FOR SOLUTION ORAL 2 TIMES DAILY
Status: DISCONTINUED | OUTPATIENT
Start: 2018-08-31 | End: 2018-09-01 | Stop reason: HOSPADM

## 2018-08-31 RX ADMIN — FAMOTIDINE 20 MG: 20 TABLET ORAL at 09:01

## 2018-08-31 RX ADMIN — ACETAMINOPHEN 650 MG: 325 TABLET ORAL at 14:00

## 2018-08-31 RX ADMIN — OXYCODONE HYDROCHLORIDE 5 MG: 5 TABLET ORAL at 22:03

## 2018-08-31 RX ADMIN — OXYBUTYNIN CHLORIDE 5 MG: 5 TABLET, EXTENDED RELEASE ORAL at 09:01

## 2018-08-31 RX ADMIN — Medication 10 ML: at 05:55

## 2018-08-31 RX ADMIN — OXYCODONE HYDROCHLORIDE 5 MG: 5 TABLET ORAL at 09:01

## 2018-08-31 RX ADMIN — FAMOTIDINE 20 MG: 20 TABLET ORAL at 19:38

## 2018-08-31 RX ADMIN — LEVOTHYROXINE SODIUM 100 MCG: 100 TABLET ORAL at 07:29

## 2018-08-31 RX ADMIN — Medication 10 ML: at 19:38

## 2018-08-31 RX ADMIN — ACETAMINOPHEN 650 MG: 325 TABLET ORAL at 00:22

## 2018-08-31 RX ADMIN — Medication 10 ML: at 23:26

## 2018-08-31 RX ADMIN — Medication 1 TABLET: at 19:37

## 2018-08-31 RX ADMIN — MONTELUKAST SODIUM 10 MG: 10 TABLET, FILM COATED ORAL at 09:01

## 2018-08-31 RX ADMIN — Medication 1 TABLET: at 09:01

## 2018-08-31 RX ADMIN — ACETAMINOPHEN 650 MG: 325 TABLET ORAL at 23:25

## 2018-08-31 RX ADMIN — POLYETHYLENE GLYCOL 3350 17 G: 17 POWDER, FOR SOLUTION ORAL at 19:38

## 2018-08-31 RX ADMIN — ACETAMINOPHEN 650 MG: 325 TABLET ORAL at 19:37

## 2018-08-31 RX ADMIN — MAGNESIUM HYDROXIDE 30 ML: 400 SUSPENSION ORAL at 23:25

## 2018-08-31 RX ADMIN — DIAZEPAM 5 MG: 5 TABLET ORAL at 04:04

## 2018-08-31 RX ADMIN — ACETAMINOPHEN 650 MG: 325 TABLET ORAL at 05:55

## 2018-08-31 NOTE — PROGRESS NOTES
Problem: Mobility Impaired (Adult and Pediatric)  Goal: *Acute Goals and Plan of Care (Insert Text)  Physical Therapy Goals  Initiated 8/30/2018    1. Patient will move from supine to sit and sit to supine  and roll side to side in bed with independence within 4 days. 2. Patient will perform sit to stand with independence within 4 days. 3. Patient will ambulate with independence for 300 feet with the least restrictive device within 4 days. 4. Patient will ascend/descend 4 stairs with 1 handrail(s) with independence within 4 days. 5. Patient will verbalize and demonstrate understanding of spinal precautions (No bending, lifting greater than 5 lbs, or twisting; log-roll technique; frequent repositioning as instructed) within 4 days. physical Therapy TREATMENT  Patient: Stevan Rothman (58 y.o. female)  Date: 8/31/2018  Precautions:    Chart, physical therapy assessment, plan of care and goals were reviewed. ASSESSMENT:  Pt received sitting up in chair, brace donned. Agreeable to therapy. Reports 3/10 pain at rest, 8/10 when completing transfers. Pt amb 150FT this afternoon w/ Min and HHA. Reports BLE weakness and declined further gait. Pt returned to sitting EOB so RN can tent to drain. Pt able to doff brace w/ supervision-Min A for complete removal of brace. Pt completed/cleared steps 8/30. Pt ready for d/c when medically cleared. Progression toward goals:  [x]      Improving appropriately and progressing toward goals  []      Improving slowly and progressing toward goals  []      Not making progress toward goals and plan of care will be adjusted     PLAN:  Patient continues to benefit from skilled intervention to address the above impairments. Continue treatment per established plan of care. Discharge Recommendations:  Home Health  Further Equipment Recommendations for Discharge: Owns SPC and RW       SUBJECTIVE:   Patient stated My legs are just weak.    The patient stated 3/3 back precautions. Reviewed all 3 with patient. OBJECTIVE DATA SUMMARY:   Functional Mobility Training:  Bed Mobility:  Log Rolling:  (pt received in sidelying (R))  Supine to Sit:  (pt received OOB in chair)  Sit to Supine:  (pt returend to sittingEOB w/RN present)           Brace doffed w/ supervision-Min A  Transfers:  Sit to Stand: Contact guard assistance;Minimum assistance (Min A from BR toilet)  Stand to Sit: Contact guard assistance                             Ambulation/Gait Training:  Distance (ft): 150 Feet (ft)  Assistive Device: Brace/Splint;Gait belt  Ambulation - Level of Assistance: Contact guard assistance;Minimal assistance;Assist x1 (HHA -R)        Gait Abnormalities: Decreased step clearance;Trunk sway increased        Base of Support: Narrowed     Speed/Kelsey: Pace decreased (<100 feet/min)  Step Length: Left shortened;Right shortened                               Pain:  Pain Scale 1: Numeric (0 - 10)  Pain Intensity 1: 2        Pain Description 1: Sore;Aching     Activity Tolerance:   VSS  Please refer to the flowsheet for vital signs taken during this treatment.   After treatment:   []  Patient left in no apparent distress sitting up in chair  [x]  Patient left in no apparent distress sitting edge of bed  [x]  Call bell left within reach  [x]  Nursing notified  []  Caregiver present  []  Bed alarm activated    COMMUNICATION/COLLABORATION:   The patients plan of care was discussed with: Registered Nurse    Patrica Soto PTA   Time Calculation: 14 mins

## 2018-08-31 NOTE — DISCHARGE SUMMARY
[address]   [address] [place] [id number]    DISCHARGE SUMMARY     Patient: Timo DalyShavertown Nando Record Number: [id number]                : 1940  Age: 78 y.o. Admit Date: 2018  Discharge Date:   Admission Diagnosis: SPINAL STENOSIS L3-4, STATUS POST SPINAL FUSION L4-S1  Spinal stenosis of lumbar region  Discharge Diagnosis: SPINAL STENOSIS L3-4, STATUS POST SPINAL FUSION L4-S1  Procedures: Procedure(s):  REVISION LUMBAR LAMINECTOMY OF FUSION L3-S1  Surgeon: DALIA Rabago MD  Assistants:  Nestor Mayers PA-C  Anesthesia: general  Complications: None     History of Present Illness:  Elias Navarro is a 82-year-old female known to me having undergone a prior decompression and spinal fusion L4 to S1 for symptomatic lumbar stenosis. She has done quite well. She came to us with pain and weakness in the left lower extremity. Weakness in the hip flexor function in particular. Imaging demonstrates degrees of stenosis at the L3-4 level in particular with accompanying foraminal compromise. Given her degree of symptomatology and those findings clinically and radiologically, a revision decompression and spinal fusion offered. Potential benefits and complications reviewed.     Hospital Course:  Elias Navarro tolerated the procedure well. She was transferred to the Spinal Unit from the recovery room in stable condition. On postoperative day 1, the dressing was noted to be clean and dry, she was neurovascularly intact and afebrile, and her vital signs were stable. Hemoglobin was noted to be   Lab Results   Component Value Date/Time    HGB 9.9 (L) 2018 04:06 AM     On postoperative day 2-3,  Elias Navarro made excellent progress with physical therapy and was discharged to Home in stable condition on postoperative day 3.   She was given routine postoperative instructions and advised to follow up in the office in 2 weeks following discharge home. She was given the following prescriptions for pain medications. Discharge Medications:     Current Discharge Medication List      START taking these medications    Details   oxyCODONE IR (ROXICODONE) 5 mg immediate release tablet Take 1 Tab by mouth every four (4) hours as needed. Max Daily Amount: 30 mg.  Qty: 50 Tab, Refills: 0    Associated Diagnoses: Spinal stenosis of lumbar region, unspecified whether neurogenic claudication present      senna-docusate (PERICOLACE) 8.6-50 mg per tablet Take 1 Tab by mouth daily. Qty: 30 Tab, Refills: 0         CONTINUE these medications which have NOT CHANGED    Details   budesonide-formoterol (SYMBICORT) 160-4.5 mcg/actuation HFAA Take 2 Puffs by inhalation two (2) times a day. levothyroxine (SYNTHROID) 100 mcg tablet Take 100 mcg by mouth Daily (before breakfast). Calcium-Cholecalciferol, D3, 500 mg(1,250mg) -400 unit tab Take 1 Tab by mouth daily. montelukast (SINGULAIR) 10 mg tablet Take 10 mg by mouth daily. tolterodine ER (DETROL LA) 4 mg ER capsule Take 4 mg by mouth daily. raNITIdine (ZANTAC) 150 mg tablet Take 150 mg by mouth two (2) times a day. albuterol (PROAIR HFA) 90 mcg/actuation inhaler Take 2 Puffs by inhalation every four (4) hours as needed for Wheezing. tiotropium (SPIRIVA WITH HANDIHALER) 18 mcg inhalation capsule Take 1 Cap by inhalation daily. MULTIVITAMIN PO Take 1 Tab by mouth daily.          STOP taking these medications       aspirin 81 mg chewable tablet Comments:   Reason for Stopping:                   Signed by: Sudeep Mayers PA-C  8/31/2018

## 2018-08-31 NOTE — PROGRESS NOTES
ORTHOPAEDIC LUMBAR FUSION PROGRESS NOTE    NAME: Alejandro Andrade   :  1940   MRN:  [id number]   DATE:  2018    POD: 2 Day Post-Op  S/P: Procedure(s):  REVISION LUMBAR LAMINECTOMY OF FUSION L3-S1     SUBJECTIVE:    Patient found awake/oriented, c/o increased back pain today. She ambulated 350 feet yesterday with PT. -denies leg pain/numbness. Afebrile/VSS. Denies nausea/vomiting, chest pain, headache or shortness of breath. Discussed working with PT/OT today, OOB along with pain management. Recent Labs      18   0406  18   0410   HGB  9.9*  10.9*   NA   --   141   K   --   4.4   CL   --   107   CO2   --   24   BUN   --   10   CREA   --   0.69   GLU   --   112*     Patient Vitals for the past 12 hrs:   BP Temp Pulse Resp SpO2   18 0851 116/69 98.8 °F (37.1 °C) 93 16 97 %   18 0358 96/58 99 °F (37.2 °C) 87 16 92 %     Drain: 125 ml    EXAM:  Positive strength/ROM bilat lower ext. Neuro intact  Dressings clean, dry and intact     ASSESSMENT/PLAN:    1. POD#2 REVISION LUMBAR LAMINECTOMY OF FUSION L3-S1   -Continue multimodal pain control- repositioning, PRN oxycodone, scheduled APAP, ice to back   -D/c Hemovac  -voiding  -bowel regimen- stool softener + laxative  -PT/OT, OOB    2. COPD  -stable. Continue LABA-ICS nebs and ipratropium nebs  -Singulair  -encourage pulmonary hygiene    3. postoperative blood loss anemia, acute  -patient tolerating- hgb 9.9. Continue to monitor    4.  Hypothyoridism  -synthroid 100 mcg    D/c planning- possibly tomorrow if pain managed and medically stable    Osmin Clark, NP

## 2018-08-31 NOTE — PROGRESS NOTES
Cm followed up with patient to discuss home health. Patient is agreeable and would like to use [facility].  Referral sent and they can accept ([phone])  [employer] , ACM

## 2018-08-31 NOTE — DISCHARGE INSTRUCTIONS
After Hospital Care Plan:  Discharge Instructions Lumbar Fusion Surgery   DESI Marcelo PA-C    Patient Name [facility])    Date of procedure: 8/30/2018      Date of discharge:    Procedure (Procedure(s):  REVISION LUMBAR LAMINECTOMY OF FUSION L3-S1)    Surgeon (Surgeon(s) and Role:     * Farza Lam MD - Primary)       PCP:    Follow up appointments  -follow up with Dr. Kalina Arvizu in 2 weeks. Call [phone], ext [phone] to make an appointment as soon as you get home from the hospital.    [address]: _________________________   phone: _______________________  The agency will contact you to arrange dates/times for visits. Please call them if you do not hear from them within 24 hours after you are discharged      When to call your Orthopaedic Surgeon:  -Signs of infection-if your incision is red; continues to have drainage; drainage has a foul odor or if you have a persistent fever over 101 degrees for 24 hours  -nausea or vomiting, severe headache  -loss of bowel or bladder function, inability to urinate  -changes in sensation in your arms or legs (numbness, tingling, loss of color)  -increased weakness-greater than before your surgery  -severe pain or pain not relieved by medications  -Signs of a blood clot in your leg-calf pain, tenderness, redness, swelling of lower leg  When to call your Primary Care Physician:  -Concerns about medical conditions such as diabetes, high blood pressure, asthma, congestive heart failure  -Call if blood sugars are elevated, persistent headache or dizziness, coughing or congestion, constipation or diarrhea, burning with urination, abnormal heart rate  When to call 911and go to the nearest emergency room  -acute onset of chest pain, shortness of breath, difficulty breathing    Activity  - Your only exercise should be walking.   Start with short frequent walks and increase your walking distance each day.  -Limit the amount of time you sit to 20-30 minute intervals. Sitting for prolonged periods of time will be uncomfortable for you following surgery.  -Do NOT lift anything over 5 pounds  -Do NOT do any straining, twisting or bending  -When you are in bed, you may lay on your back or on either side. Do NOT lie on your stomach    Brace - if ordered by surgeon   -If you have a back brace, you should wear your brace at all times when you are out of bed. Do not wear the brace while in bed or showering.  -Remember to always wear a cotton t-shirt underneath your brace.  -Do not bend or twist when your brace is off      Driving  -You may not drive or return to work until instructed by your physician. However, you may ride in the car for short periods of time. Incision Care  Your incision has been closed with absorbable sutures and the Dermabond Prineo skin closure system. This is a combination of a mesh and a liquid adhesive that will assist with healing. The mesh is to remain on your incision for 2 weeks. A dry dressing (ABD and tape) will be placed over it and should be changed daily. Please make sure the person performing your dressing changes washes their hands before touching the dressing. You may take brief showers but do not run the water directly onto the wound. After your shower, remove your dressing and blot your incision dry with a clean, soft towel and replace the dry dressing. Do not allow the tape to come in contact with the mesh. Do not rub or apply any lotions or ointments to your incision site. Do not soak or scrub your wound. The mesh dressing will be removed during your two week follow-up appointment. If you experience drainage leaking from underneath the mesh or if it peels off before 2 weeks, please contact your orthopedic surgeons office.     Showering  -You may shower in approximately 4 days after your surgery.      -Leave the dressing on during your shower without allowing the water to run over it.    -Reminder- your brace can be removed while showering. Remember to not bend or twist while your brace is off.      -Do not take a tub bath. Preventing blood clots  -You have been given T.E.D. stockings to wear. Continue to wear these for 7 days after your discharge. Put them on in the morning and take them off at night.      -They are used to increase your circulation and prevent blood clots from forming in your legs    Pain management  -take pain medication as prescribed; decrease the amount you use as your pain lessens  -avoid alcoholic beverages while taking pain medication  -avoid NSAIDS (Aleve, Ibuprofen, Aspirin) until your surgeon approves it  -Please be aware that many medications contain Tylenol. We do not want you to over medicate so please read the information below as a guide. Do not take more than 4 Grams of Tylenol in a 24 hour period. (There are 1000 milligrams in one Gram)  Percocet contains 325 mg of Tylenol per tablet (do not take more than 12 tablets in 24 hours)  Lortab contains 500 mg of Tylenol per tablet (do not take more than 8 tablets in 24 hours)  Norco contains 325 mg of Tylenol per tablet (do not take more than 12 tablets in 24 hours). Diet  -resume usual diet; drink plenty of fluids; eat foods high in fiber  -It is important to have regular bowel movements. Pain medications may cause constipation. You may want to take a stool softener (such as Senokot-S or Colace) to prevent constipation. If constipation occurs, take a laxative (such as Dulcolax tablets, Milk of Magnesia, or a suppository).   Laxatives should only be used if the above preventable measures have failed and you still have not had a bowel movement after three days

## 2018-08-31 NOTE — PROGRESS NOTES
Problem: Mobility Impaired (Adult and Pediatric)  Goal: *Acute Goals and Plan of Care (Insert Text)  Physical Therapy Goals  Initiated 8/30/2018    1. Patient will move from supine to sit and sit to supine  and roll side to side in bed with independence within 4 days. 2. Patient will perform sit to stand with independence within 4 days. 3. Patient will ambulate with independence for 300 feet with the least restrictive device within 4 days. 4. Patient will ascend/descend 4 stairs with 1 handrail(s) with independence within 4 days. 5. Patient will verbalize and demonstrate understanding of spinal precautions (No bending, lifting greater than 5 lbs, or twisting; log-roll technique; frequent repositioning as instructed) within 4 days. physical Therapy TREATMENT  Patient: Elizabeth Raphael (75 y.o. female)  Date: 8/31/2018  Diagnosis: SPINAL STENOSIS L3-4, STATUS POST SPINAL FUSION L4-S1  Spinal stenosis of lumbar region <principal problem not specified>  Procedure(s) (LRB):  REVISION LUMBAR LAMINECTOMY OF FUSION L3-S1 (N/A) 2 Days Post-Op  Precautions:    Chart, physical therapy assessment, plan of care and goals were reviewed. ASSESSMENT:  Pt received in R sidelying, agreeable to PT. Noted HOB slightly elevated while pt in sidelying, provided education on proper positioning of HOB when in sidelying to maintain neutral spine. Pt demonstrated bed mobility w/ CGA. Able to stand to [place] brace w. Supervision-Set up. Pt amb to BR prior to gait in cheng; managed toileting/hygeine w/CGA-Mod I. Pt completed approx 100 Ft gait this morning. Noted RLE w/ minor delay in advancement, trunk sway noted as well. HHA provided on R, CGA-Min a for gait overall. Encouraged pt to use [facility]) for amb, especially when returned home. Pt reports she usually furniture walks and does not like to use the cane. Pt returned to chair at bedside w/ all items in reach/ needs met. Completed BLE exercises. Will f/u for afternoon PT.  Pt completed stair training 8/30/2018 and reports confidence in ability to complete stairs once home. Pt may not d/c until tomorrow (Saturday 9/1). Progression toward goals:  [x]      Improving appropriately and progressing toward goals  []      Improving slowly and progressing toward goals  []      Not making progress toward goals and plan of care will be adjusted     PLAN:  Patient continues to benefit from skilled intervention to address the above impairments. Continue treatment per established plan of care. Discharge Recommendations:  Home Health  Further Equipment Recommendations for Discharge:  Straight Cane (pt owns)     SUBJECTIVE:   Patient stated This will be easier when I have a mirror.  (donning of brace)  The patient stated 3/3 back precautions. Reviewed all 3 with patient. OBJECTIVE DATA SUMMARY:   Critical Behavior:  Neurologic State: Alert  Orientation Level: Oriented X4  Cognition: Appropriate for age attention/concentration, Appropriate decision making, Appropriate safety awareness, Follows commands  Safety/Judgement: Awareness of environment, Fall prevention  Functional Mobility Training:  Bed Mobility:  Log Rolling:  (pt received in sidelying (R))  Supine to Sit: Contact guard assistance  Sit to Supine:  (NT- pt returned to chair)           Brace donned with  supervision/set-up while standing at bedside.   Transfers:  Sit to Stand: Contact guard assistance  Stand to Sit: Contact guard assistance                             Balance:  Sitting: Intact  Standing: Impaired  Standing - Static: Good  Standing - Dynamic : Fair  Ambulation/Gait Training:  Distance (ft): 100 Feet (ft)  Assistive Device: Brace/Splint;Gait belt  Ambulation - Level of Assistance: Contact guard assistance;Minimal assistance;Assist x1 (HHA -R)        Gait Abnormalities: Decreased step clearance;Trunk sway increased        Base of Support: Narrowed     Speed/Kelsey: Pace decreased (<100 feet/min)  Step Length: Left shortened;Right shortened                             Therapeutic Exercises:   Seated: Marches, LAQ's, ankle pumps  Pain:  Pain Scale 1: Numeric (0 - 10)  Pain Intensity 1: 2        Pain Description 1: Sore;Aching     Activity Tolerance:   VSS  Please refer to the flowsheet for vital signs taken during this treatment.   After treatment:   []  Patient left in no apparent distress sitting up in chair  [x]  Patient left in no apparent distress in bed  [x]  Call bell left within reach  [x]  Nursing notified  []  Caregiver present  []  Bed alarm activated    COMMUNICATION/COLLABORATION:   The patients plan of care was discussed with: Registered Nurse    Bonnie SotoPTA   Time Calculation: 18 mins

## 2018-09-01 VITALS
SYSTOLIC BLOOD PRESSURE: 107 MMHG | DIASTOLIC BLOOD PRESSURE: 62 MMHG | WEIGHT: 140 LBS | TEMPERATURE: 98.2 F | HEART RATE: 86 BPM | RESPIRATION RATE: 16 BRPM | OXYGEN SATURATION: 96 % | HEIGHT: 61 IN | BODY MASS INDEX: 26.43 KG/M2

## 2018-09-01 PROCEDURE — 74011000250 HC RX REV CODE- 250: Performed by: PHYSICIAN ASSISTANT

## 2018-09-01 PROCEDURE — 94760 N-INVAS EAR/PLS OXIMETRY 1: CPT

## 2018-09-01 PROCEDURE — 74011250637 HC RX REV CODE- 250/637: Performed by: PHYSICIAN ASSISTANT

## 2018-09-01 RX ADMIN — ACETAMINOPHEN 650 MG: 325 TABLET ORAL at 08:27

## 2018-09-01 RX ADMIN — OXYBUTYNIN CHLORIDE 5 MG: 5 TABLET, EXTENDED RELEASE ORAL at 09:09

## 2018-09-01 RX ADMIN — CALCIUM CARBONATE-VITAMIN D TAB 500 MG-200 UNIT 1 TABLET: 500-200 TAB at 09:09

## 2018-09-01 RX ADMIN — MONTELUKAST SODIUM 10 MG: 10 TABLET, FILM COATED ORAL at 09:09

## 2018-09-01 RX ADMIN — LEVOTHYROXINE SODIUM 100 MCG: 100 TABLET ORAL at 07:33

## 2018-09-01 RX ADMIN — Medication 10 ML: at 07:36

## 2018-09-01 RX ADMIN — FAMOTIDINE 20 MG: 20 TABLET ORAL at 09:09

## 2018-09-01 NOTE — PROGRESS NOTES
Orthopaedics Daily Progress Note                            Date of Surgery:  8/29/2018      Patient: Enriqueta Gonzalez   YOB: 1940  Age: 78 y.o. SUBJECTIVE:   3 Days Post-Op following REVISION LUMBAR LAMINECTOMY OF FUSION L3-S1. The patient's post operative pain is well controlled. No CP/SOB. No N/V. The patient's mobility will be evaluated today during PT sessions. She is already OOB to chair this morning and looking forward to going home. OBJECTIVE:     Vital Signs:      Visit Vitals    /62 (BP 1 Location: Right arm, BP Patient Position: At rest)    Pulse 86    Temp 98.2 °F (36.8 °C)    Resp 16    Ht 5' 1\" (1.549 m)    Wt 63.5 kg (140 lb)    SpO2 96%    Breastfeeding No    BMI 26.45 kg/m2       Physical Exam:  General: A&Ox3. The patient is cooperative, and in no acute distress. OOB to chair. Respiratory: Respirations are unlabored. Surgical site(s): dressing clean, dry  Musculoskeletal: Calves are soft, supple, and non-tender upon palpation. Motor 5/5. Neurological:  Neurovascularly intact with good dorsi and plantar flexion. Pulses symmetrical.        PLAN:     S/P REVISION LUMBAR LAMINECTOMY OF FUSION L3-S1 -Continue WBAT. -Mobilize and continue with PT/OT until discharged     Hemodynamics Acute blood loss anemia as expected. Patient asymptomatic. Continue to monitor. Wound Monitor postop dressing; no postop dressing changes necessary. Reinforce PRN. Post Operative Pain Pain Control: stable, mild-to-moderate joint symptoms intermittently, reasonably well controlled by current meds. DVT Prophylaxis Continue with SCD'S, Ankle Pump Exercises. Discharge Disposition Discharge plan: Home with home health pending PT clearance & pain control today.        Signed By: Nelli Shen PA-C  September 1, 2018 8:42 AM

## 2020-11-18 ENCOUNTER — TRANSCRIBE ORDER (OUTPATIENT)
Dept: SCHEDULING | Age: 80
End: 2020-11-18

## 2020-11-18 DIAGNOSIS — Z12.31 SCREENING MAMMOGRAM, ENCOUNTER FOR: Primary | ICD-10-CM

## 2020-12-11 ENCOUNTER — HOSPITAL ENCOUNTER (OUTPATIENT)
Dept: MAMMOGRAPHY | Age: 80
Discharge: HOME | End: 2020-12-11
Attending: FAMILY MEDICINE
Payer: MEDICARE

## 2020-12-11 DIAGNOSIS — Z12.31 SCREENING MAMMOGRAM, ENCOUNTER FOR: ICD-10-CM

## 2020-12-11 PROCEDURE — 77067 SCR MAMMO BI INCL CAD: CPT

## 2021-04-14 ENCOUNTER — HOSPITAL ENCOUNTER (OUTPATIENT)
Dept: PREADMISSION TESTING | Age: 81
Discharge: HOME | End: 2021-04-14
Payer: MEDICARE

## 2021-04-14 VITALS
SYSTOLIC BLOOD PRESSURE: 173 MMHG | HEIGHT: 61 IN | OXYGEN SATURATION: 95 % | DIASTOLIC BLOOD PRESSURE: 73 MMHG | HEART RATE: 71 BPM | WEIGHT: 138.89 LBS | TEMPERATURE: 97.4 F | BODY MASS INDEX: 26.22 KG/M2

## 2021-04-14 LAB
ABO + RH BLD: NORMAL
ANION GAP SERPL CALC-SCNC: 3 MMOL/L (ref 5–15)
APPEARANCE UR: CLEAR
BACTERIA URNS QL MICRO: NEGATIVE /HPF
BILIRUB UR QL: NEGATIVE
BLOOD GROUP ANTIBODIES SERPL: NORMAL
BUN SERPL-MCNC: 20 MG/DL (ref 6–20)
BUN/CREAT SERPL: 27 (ref 12–20)
CALCIUM SERPL-MCNC: 9.9 MG/DL (ref 8.5–10.1)
CHLORIDE SERPL-SCNC: 105 MMOL/L (ref 97–108)
CO2 SERPL-SCNC: 30 MMOL/L (ref 21–32)
COLOR UR: ABNORMAL
CREAT SERPL-MCNC: 0.75 MG/DL (ref 0.55–1.02)
EPITH CASTS URNS QL MICRO: ABNORMAL /LPF
ERYTHROCYTE [DISTWIDTH] IN BLOOD BY AUTOMATED COUNT: 12.9 % (ref 11.5–14.5)
EST. AVERAGE GLUCOSE BLD GHB EST-MCNC: 103 MG/DL
GLUCOSE SERPL-MCNC: 96 MG/DL (ref 65–100)
GLUCOSE UR STRIP.AUTO-MCNC: NEGATIVE MG/DL
HBA1C MFR BLD: 5.2 % (ref 4–5.6)
HCT VFR BLD AUTO: 39.2 % (ref 35–47)
HGB BLD-MCNC: 12.7 G/DL (ref 11.5–16)
HGB UR QL STRIP: NEGATIVE
INR PPP: 1 (ref 0.9–1.1)
KETONES UR QL STRIP.AUTO: NEGATIVE MG/DL
LEUKOCYTE ESTERASE UR QL STRIP.AUTO: ABNORMAL
MCH RBC QN AUTO: 31 PG (ref 26–34)
MCHC RBC AUTO-ENTMCNC: 32.4 G/DL (ref 30–36.5)
MCV RBC AUTO: 95.6 FL (ref 80–99)
NITRITE UR QL STRIP.AUTO: NEGATIVE
NRBC # BLD: 0 K/UL (ref 0–0.01)
NRBC BLD-RTO: 0 PER 100 WBC
PH UR STRIP: 6.5 [PH] (ref 5–8)
PLATELET # BLD AUTO: 307 K/UL (ref 150–400)
PMV BLD AUTO: 10.6 FL (ref 8.9–12.9)
POTASSIUM SERPL-SCNC: 5.5 MMOL/L (ref 3.5–5.1)
PROT UR STRIP-MCNC: NEGATIVE MG/DL
PROTHROMBIN TIME: 10.2 SEC (ref 9–11.1)
RBC # BLD AUTO: 4.1 M/UL (ref 3.8–5.2)
RBC #/AREA URNS HPF: ABNORMAL /HPF (ref 0–5)
SODIUM SERPL-SCNC: 138 MMOL/L (ref 136–145)
SP GR UR REFRACTOMETRY: 1.01 (ref 1–1.03)
SPECIMEN EXP DATE BLD: NORMAL
UA: UC IF INDICATED,UAUC: ABNORMAL
UROBILINOGEN UR QL STRIP.AUTO: 0.2 EU/DL (ref 0.2–1)
WBC # BLD AUTO: 8.6 K/UL (ref 3.6–11)
WBC URNS QL MICRO: ABNORMAL /HPF (ref 0–4)

## 2021-04-14 PROCEDURE — 85610 PROTHROMBIN TIME: CPT

## 2021-04-14 PROCEDURE — 81001 URINALYSIS AUTO W/SCOPE: CPT

## 2021-04-14 PROCEDURE — 85027 COMPLETE CBC AUTOMATED: CPT

## 2021-04-14 PROCEDURE — 36415 COLL VENOUS BLD VENIPUNCTURE: CPT

## 2021-04-14 PROCEDURE — 80048 BASIC METABOLIC PNL TOTAL CA: CPT

## 2021-04-14 PROCEDURE — 93005 ELECTROCARDIOGRAM TRACING: CPT

## 2021-04-14 PROCEDURE — 83036 HEMOGLOBIN GLYCOSYLATED A1C: CPT

## 2021-04-14 PROCEDURE — 86901 BLOOD TYPING SEROLOGIC RH(D): CPT

## 2021-04-14 RX ORDER — FLUTICASONE PROPIONATE 50 MCG
2 SPRAY, SUSPENSION (ML) NASAL DAILY
Status: ON HOLD | COMMUNITY

## 2021-04-14 RX ORDER — FLUTICASONE FUROATE, UMECLIDINIUM BROMIDE AND VILANTEROL TRIFENATATE 100; 62.5; 25 UG/1; UG/1; UG/1
1 POWDER RESPIRATORY (INHALATION) DAILY
Status: ON HOLD | COMMUNITY

## 2021-04-14 RX ORDER — ASPIRIN 81 MG/1
81 TABLET ORAL DAILY
COMMUNITY
End: 2021-04-27

## 2021-04-14 RX ORDER — FAMOTIDINE 20 MG/1
20 TABLET, FILM COATED ORAL 2 TIMES DAILY
Status: ON HOLD | COMMUNITY

## 2021-04-14 RX ORDER — MELATONIN
2000 DAILY
Status: ON HOLD | COMMUNITY

## 2021-04-14 RX ORDER — DICLOFENAC SODIUM 10 MG/G
GEL TOPICAL 4 TIMES DAILY
COMMUNITY
End: 2021-04-27

## 2021-04-14 RX ORDER — DICLOFENAC SODIUM 50 MG/1
TABLET, DELAYED RELEASE ORAL 2 TIMES DAILY
COMMUNITY
End: 2021-04-27

## 2021-04-14 RX ORDER — LIDOCAINE 3.5 G/1
PATCH TOPICAL AS NEEDED
Status: ON HOLD | COMMUNITY

## 2021-04-14 NOTE — PERIOP NOTES
Preoperative instructions reviewed with patient. Patient given two bottles of CHG soap. Instructions (reviewed/to be reviewed in class) on use of CHG soap. Patient given SSI infection FAQS sheet, as well as a MRSA/MSSA treatment instruction sheet with an explanation to patient that they will be notified if treatment instructions need to be initiated. Patient was given the opportunity to ask questions on the information provided. INFORMATION REGARDING COVID 19 TESTING PRIOR TO SURGERY WAS PROVIDED TO THE PATIENT WITH THE OPPORTUNITY FOR QUESTIONS. PATIENT VERBALIZED UNDERSTANDING. PT'S B/P WAS ELEVATED DURING PAT APPT. B/P 173/73 IN LEFT ARM. PT DOES NOT TAKE ANY B/P MEDICATION. PT DENIES CHEST PAIN, HEADACHE, SOB. NP SILVINA [facility] EVALUATED PT.  PT OK TO LEAVE PAT.

## 2021-04-15 LAB
ATRIAL RATE: 55 BPM
BACTERIA SPEC CULT: NORMAL
BACTERIA SPEC CULT: NORMAL
CALCULATED P AXIS, ECG09: 60 DEGREES
CALCULATED R AXIS, ECG10: 53 DEGREES
CALCULATED T AXIS, ECG11: 73 DEGREES
DIAGNOSIS, 93000: NORMAL
P-R INTERVAL, ECG05: 148 MS
Q-T INTERVAL, ECG07: 422 MS
QRS DURATION, ECG06: 80 MS
QTC CALCULATION (BEZET), ECG08: 403 MS
SERVICE CMNT-IMP: NORMAL
VENTRICULAR RATE, ECG03: 55 BPM

## 2021-04-15 NOTE — PERIOP NOTES
PAT Nurse Practitioner   Pre-Operative Chart Review/Assessment:-ORTHOPEDIC                Patient Name:  Jose Carlos Mondragon                                                           Age:   68 y.o.    :  1940     Today's Date:  2021     Date of PAT:   2021      Date of Surgery:    2021      Procedure(s):  Right Total Knee Arthroplasty     Surgeon:   Dr. Mylene Montano                       PLAN:      1)  Medical Clearance:  Dr. Zora Warren      2)  Cardiac Clearance:  EKG and METs reviewed. No further cardiac testing requested. 3)  Diabetic Treatment Consult:  Not indicated. A1c-5.2      4)  Sleep Apnea evaluation:   Not indicated. SAUL Score 1.       5) Treatment for MRSA/Staph Aureus:  Neg      6) Additional Concerns:  Former smoker, GERD, COPD (followed by Dr. Marcella Hagen), Hx of CVA (R hand weakness), [place] L side    **Pt /73 during PAT visit. Pt w/o dx of HTN and not on any BP meds. She reports it sometimes is high when she goes to the doctor. No c/o HA, CP or dizziness. Pt reports being anxious about upcoming surgery. She has BP cuff at home but does not check it regularly. Instructed pt to check BP once home and settled and call PCP if SBP>160. No protein noted in urine. EKG WNL. Labs and EKG routed to PCP for continuity of care. **              Vital Signs:         Vitals:    21 1240   BP: (!) 173/73   Pulse: 71   Temp: 97.4 °F (36.3 °C)   SpO2: 95%   Weight: 63 kg (138 lb 14.2 oz)   Height: 5' 1\" (1.549 m)            ____________________________________________  PAST MEDICAL HISTORY  Past Medical History:   Diagnosis Date    Acid reflux     Acoustic neuroma ([address] 2012    GAMMA KNIFE DR Malia Maya    Arthritis     Chronic obstructive pulmonary disease (HCC)     Chronic pain     left  leg    GERD (gastroesophageal reflux disease)     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     Hypothyroid     Other ill-defined conditions(799.89)     cavinous sinus thrombosis    Shingles 2014    Stroke ([address] 08/2012    CAVERNOUS SINUS THROMBOSIS; RIGHT HAND WEAKNESS.      ____________________________________________  PAST SURGICAL HISTORY  Past Surgical History:   Procedure Laterality Date    HC SLNG OBTURATOR SYS TVT GYNECA  2008    HX CATARACT REMOVAL  2014    HX CERVICAL FUSION  2011    C5-7    HX CHOLECYSTECTOMY  2010    HX COLONOSCOPY  2016    HX GYN  2008    COLOPORRHAPHY    HX HEART CATHETERIZATION  2016    NEGATIVE PER PATIENT    HX HEENT Left 2017    BONE INDUCTION FOR HEARING AID    HX HIP REPLACEMENT Right 04/2018    DR SOLOMON    HX HYSTERECTOMY  1976    HX LUMBAR FUSION  2014    HX ORTHOPAEDIC  2006    TRIGGER FINGER    HX OTHER SURGICAL  2012    GAMMA KNIFE RADIATION    HX REFRACTIVE SURGERY Bilateral 2003    LASIK    HX TONSILLECTOMY  1948    NM COMBINED ANT/POST COLPORRHAPHY  2008    NM REMOVAL OF ANAL FISSURE  1965      ____________________________________________  HOME MEDICATIONS  Current Outpatient Medications   Medication Sig    aspirin delayed-release 81 mg tablet Take 81 mg by mouth daily.  fluticasone propionate (FLONASE) 50 mcg/actuation nasal spray 2 Sprays by Both Nostrils route daily.  fluticasone-umeclidinium-vilanterol (Trelegy Ellipta) 100-62.5-25 mcg inhaler Take 1 Puff by inhalation daily.  cholecalciferol (Vitamin D3) (1000 Units /25 mcg) tablet Take 2,000 Units by mouth daily.  diclofenac EC (VOLTAREN) 50 mg EC tablet Take  by mouth two (2) times a day.  famotidine (PEPCID) 20 mg tablet Take 20 mg by mouth two (2) times a day.  lidocaine 3.5 % ptmd by Apply Externally route as needed.  diclofenac (VOLTAREN) 1 % gel Apply  to affected area four (4) times daily.  trolamine salicylate (ASPERCREME EX) by Apply Externally route as needed.  levothyroxine (SYNTHROID) 100 mcg tablet Take 100 mcg by mouth Daily (before breakfast).  montelukast (SINGULAIR) 10 mg tablet Take 10 mg by mouth daily.  tolterodine ER (DETROL LA) 4 mg ER capsule Take 4 mg by mouth daily.  albuterol (PROAIR HFA) 90 mcg/actuation inhaler Take 2 Puffs by inhalation every four (4) hours as needed for Wheezing.  MULTIVITAMIN PO Take 1 Tab by mouth daily.  oxyCODONE IR (ROXICODONE) 5 mg immediate release tablet Take 1 Tab by mouth every four (4) hours as needed. Max Daily Amount: 30 mg.    senna-docusate (PERICOLACE) 8.6-50 mg per tablet Take 1 Tab by mouth daily.  budesonide-formoterol (SYMBICORT) 160-4.5 mcg/actuation HFAA Take 2 Puffs by inhalation two (2) times a day.  Calcium-Cholecalciferol, D3, 500 mg(1,250mg) -400 unit tab Take 1 Tab by mouth daily.  raNITIdine (ZANTAC) 150 mg tablet Take 150 mg by mouth two (2) times a day.  tiotropium (SPIRIVA WITH HANDIHALER) 18 mcg inhalation capsule Take 1 Cap by inhalation daily. No current facility-administered medications for this encounter.       ____________________________________________  ALLERGIES  Allergies   Allergen Reactions    Ceclor [Cefaclor] Hives      ____________________________________________  SOCIAL HISTORY  Social History     Tobacco Use    Smoking status: Former Smoker     Packs/day: 1.00     Years: 30.00     Pack years: 30.00     Quit date: 1987     Years since quittin.2    Smokeless tobacco: Never Used   Substance Use Topics    Alcohol use:  Yes     Alcohol/week: 5.8 standard drinks     Types: 7 Glasses of wine per week      ____________________________________________        Labs:     Hospital Outpatient Visit on 2021   Component Date Value Ref Range Status    Sodium 2021 138  136 - 145 mmol/L Final    Potassium 2021 5.5* 3.5 - 5.1 mmol/L Final    Chloride 2021 105  97 - 108 mmol/L Final    CO2 2021 30  21 - 32 mmol/L Final    Anion gap 2021 3* 5 - 15 mmol/L Final    Glucose 2021 96  65 - 100 mg/dL Final    BUN 2021 20  6 - 20 MG/DL Final    Creatinine 2021 0.75 0.55 - 1.02 MG/DL Final    BUN/Creatinine ratio 04/14/2021 27* 12 - 20   Final    GFR est AA 04/14/2021 >60  >60 ml/min/1.73m2 Final    GFR est non-AA 04/14/2021 >60  >60 ml/min/1.73m2 Final    Estimated GFR is calculated using the IDMS-traceable Modification of Diet in Renal Disease (MDRD) Study equation, reported for both  Americans (GFRAA) and non- Americans (GFRNA), and normalized to 1.73m2 body surface area. The physician must decide which value applies to the patient.  Calcium 04/14/2021 9.9  8.5 - 10.1 MG/DL Final    WBC 04/14/2021 8.6  3.6 - 11.0 K/uL Final    RBC 04/14/2021 4.10  3.80 - 5.20 M/uL Final    HGB 04/14/2021 12.7  11.5 - 16.0 g/dL Final    HCT 04/14/2021 39.2  35.0 - 47.0 % Final    MCV 04/14/2021 95.6  80.0 - 99.0 FL Final    MCH 04/14/2021 31.0  26.0 - 34.0 PG Final    MCHC 04/14/2021 32.4  30.0 - 36.5 g/dL Final    RDW 04/14/2021 12.9  11.5 - 14.5 % Final    PLATELET 18/11/4040 420  150 - 400 K/uL Final    MPV 04/14/2021 10.6  8.9 - 12.9 FL Final    NRBC 04/14/2021 0.0  0  WBC Final    ABSOLUTE NRBC 04/14/2021 0.00  0.00 - 0.01 K/uL Final    Crossmatch Expiration 04/14/2021 04/28/2021,2359   Final    ABO/Rh(D) 04/14/2021 A POSITIVE   Final    Antibody screen 04/14/2021 NEG   Final    INR 04/14/2021 1.0  0.9 - 1.1   Final    A single therapeutic range for Vit K antagonists may not be optimal for all indications - see June, 2008 issue of Chest, American College of Chest Physicians Evidence-Based Clinical Practice Guidelines, 8th Edition.     Prothrombin time 04/14/2021 10.2  9.0 - 11.1 sec Final    Color 04/14/2021 YELLOW/STRAW    Final    Color Reference Range: Straw, Yellow or Dark Yellow    Appearance 04/14/2021 CLEAR  CLEAR   Final    Specific gravity 04/14/2021 1.012  1.003 - 1.030   Final    pH (UA) 04/14/2021 6.5  5.0 - 8.0   Final    Protein 04/14/2021 Negative  NEG mg/dL Final    Glucose 04/14/2021 Negative  NEG mg/dL Final    Ketone 04/14/2021 Negative  NEG mg/dL Final    Bilirubin 04/14/2021 Negative  NEG   Final    Blood 04/14/2021 Negative  NEG   Final    Urobilinogen 04/14/2021 0.2  0.2 - 1.0 EU/dL Final    Nitrites 04/14/2021 Negative  NEG   Final    Leukocyte Esterase 04/14/2021 SMALL* NEG   Final    WBC 04/14/2021 0-4  0 - 4 /hpf Final    RBC 04/14/2021 0-5  0 - 5 /hpf Final    Epithelial cells 04/14/2021 FEW  FEW /lpf Final    Epithelial cell category consists of squamous cells and /or transitional urothelial cells. Renal tubular cells, if present, are separately identified as such.  Bacteria 04/14/2021 Negative  NEG /hpf Final    UA:UC IF INDICATED 04/14/2021 CULTURE NOT INDICATED BY UA RESULT  CNI   Final    Ventricular Rate 04/14/2021 55  BPM Final    Atrial Rate 04/14/2021 55  BPM Final    P-R Interval 04/14/2021 148  ms Final    QRS Duration 04/14/2021 80  ms Final    Q-T Interval 04/14/2021 422  ms Final    QTC Calculation (Bezet) 04/14/2021 403  ms Final    Calculated P Axis 04/14/2021 60  degrees Final    Calculated R Axis 04/14/2021 53  degrees Final    Calculated T Axis 04/14/2021 73  degrees Final    Diagnosis 04/14/2021    Final                    Value:Sinus bradycardia  Otherwise normal ECG  When compared with ECG of 14-MAR-2018 09:32,  premature ventricular complexes are no longer present  Vent.  rate has decreased BY  36 BPM  Nonspecific T wave abnormality no longer evident in Inferior leads  T wave inversion no longer evident in Anterior leads  QT has shortened  Confirmed by Fortino Keene MD. ([id number]) on 4/15/2021 4:17:49 AM      Hemoglobin A1c 04/14/2021 5.2  4.0 - 5.6 % Final    Comment: NEW METHOD  PLEASE NOTE NEW REFERENCE RANGE  (NOTE)  HbA1C Interpretive Ranges  <5.7              Normal  5.7 - 6.4         Consider Prediabetes  >6.5              Consider Diabetes      Est. average glucose 04/14/2021 103  mg/dL Final    Special Requests: 04/14/2021 NO SPECIAL REQUESTS    Final    Culture result: 04/14/2021 MRSA NOT PRESENT    Final       Skin:     Denies open wounds, cuts, sores, rashes or other areas of concern in PAT assessment.           Joshua Miguel NP

## 2021-04-15 NOTE — PERIOP NOTES
CALLED DR SOLOMON`S  OFFICE AND LEFT A VOICEMAIL FOR REJI MADISON  ABOUT ABNORMAL LABS. ALL LABS AND  EKG  HAVE BEEN FAXED OVER TO OFFIC [facility].

## 2021-04-23 NOTE — H&P
Ingrid Barajas  : 1940   / Language: [place] / Race: White  Female      History of Present Illness  The patient is a 68year old female who presents with a complaint of Knee Pain. The onset of the knee pain has been sudden and has been occurring in a persistent pattern for months. The course has been gradually worsening. The knee pain is severe. The knee pain is characterized as a dull aching. The knee pain is described as being located in the entire knee (right). The knee pain is aggravated by squatting, kneeling and prolonged standing. Note for \"Knee Pain\": Right knee pain.  She wanted me to check her left hip as well.  Right knee is painful along the lateral joint line.  Recently about the 2-1/2 months ago saw another orthopedist closer to her home who injected her informed her that she was bone-on-bone in her right knee. Additional reasons for visit:    Hip Pain is described as the following: The hip pain has been occurring in a persistent pattern for months. The hip pain is described as being located in the hip (left). Previous surgery has included total hip arthroplasty (right total hip).       Problem List/Past Medical   REVIEW OF SYSTEMS: Systems were reviewed by the provider.    STENOSIS (724.02)    SPONDYLOLISTHESIS, ACQ. (738.4)    History of lumbar fusion (V45.4  Z98.1)    Lumbosacral spinal stenosis (724.02  M48.07)    SCIATICA (724.3) (724.3  M54.30)    Primary osteoarthritis of right hip (715.15  M16.11)    Left lumbar radiculitis (724.4  M54.16)    Lumbar spondylolysis (738.4  M43.06)    Primary osteoarthritis of right knee (715.16  M17.11)    Aftercare following surgery of the musculoskeletal system (V58.78  Z47.89)    Lumbar spinal stenosis (724.02  M48.061)    Lumbar spondylosis (721.3) (721.3  M47.816)    Follow-up after surgery (V67.00  Z09)    Aftercare following surgery (V58.89  Z48.89)    Acquired spondylolisthesis (738.4) (738.4  M43.10)    Low back pain (724.2  M54.5)    History of total right hip arthroplasty (V43.64  Z96.641)    Sciatica of left side (724.3  M54.32)    Elective surgery (V50.9) (V50.9  Z41. 9)    DDD (degenerative disc disease), lumbar (722.52  M51.36)    Hypertension, goal below 140/90 (401.9  I10)      Allergies  Ceclor *CEPHALOSPORINS*    Allergies Reconciled      Family History   Bladder problems      Social History  Exercise   07/08/2013: Walks 3-4 times a week. Tobacco / smoke exposure   None. Tobacco use   Former smoker. Illicit drug use   90/47/5736: none  Seat Belt Use   07/08/2013: always  HIV risk factors   07/08/2013: no  Sun Exposure   07/08/2013: frequently  Alcohol use   07/08/2013: Drinks wine 7 times per week having 1-2 drinks per occasion, never having more than 5 drinks per occasion. Caffeine use   07/08/2013: Drinks coffee 7 or more times a day. Medication History  Aspirin  (81MG Tablet, 1 Oral DAILy) Active. Diclofenac Sodium  (50MG Tablet DR, Oral) Active. Famotidine  (20MG Tablet, Oral) Active. Levothyroxine Sodium  (100MCG Tablet, Oral) Active. Montelukast Sodium  (10MG Tablet, Oral) Active. predniSONE  (10MG Tablet, Oral) Active. Tolterodine Tartrate ER  (4MG Capsule ER 24HR, Oral) Active. raNITIdine HCl  (150MG Tablet, Oral) Active. Trelegy Ellipta  (100-62.5-25MCG/INH Aero Pow Br Act, Inhalation) Active. Medications Reconciled     Past Surgical History  Hysterectomy   non-cancerous: partial  Other Eye Surgery   bilateral  Neck Disc Surgery    Gallbladder Surgery   laparoscopic  Tonsillectomy    Anal Fissure Repair      Other Problems  Pulmonary Disease    Asthma    Thyroid Disease    Unspecified Diagnosis    DDD (degenerative disc disease), lumbar (722.52)          Review of Systems  General Not Present- Chills and Fatigue. Skin Not Present- Bruising, Pallor and Skin Color Changes. Respiratory Not Present- Cough and Difficulty Breathing.   Cardiovascular Not Present- Chest Pain, Fainting / Blacking Out and Rapid Heart Rate. Musculoskeletal Present- Joint Pain. Not Present- Decreased Range of Motion and Joint Swelling. Neurological Not Present- Dysesthesia, Paresthesias and Weakness In Extremities. Hematology Not Present- Abnormal Bleeding, Blood Clots and Petechiae. Vitals  Weight: 148 lb   Height: 62 in   Weight was reported by patient. Height was reported by patient. Body Surface Area: 1.68 m²   Body Mass Index: 27.07 kg/m²        Physical Exam   Musculoskeletal  Global Assessment  Examination of related systems reveals - well-developed, well-nourished, in no acute distress, alert and oriented x 3. Right Lower Extremity - Note: Hip was examined and has painless range of motion with negative impingement and apprehension sign. Straight leg raise and femoral nerve stretch test are negative. Lower extremity has palpable dorsalis pedis pulse. Skin is intact and foot is sensate and well-perfused. Knee exam shows valgus malalignment with pain over the lateral joint line and a moderate-sized effusion. Motor testing reveals 5 out of 5 strength of the hip flexors, quads, ankle plantar flexors, and ankle dorsiflexors. Left Lower Extremity - Note: Left hip has painless range of motion. Assessment & Plan    Primary osteoarthritis of right knee (715.16  M17.11)  Impression: Injected the patient's right knee. She is indicated for surgery at her discretion. Current Plans  Pt Education - How to [address] using Patient Portal and 3rd Party Apps: discussed with patient and provided information. Pt Education - Educational materials were provided.: discussed with patient and provided information. X-RAY EXAM OF HIP COMPLETE min 2 VIEWS (25889) (AP Pelvis and Left Lateral hip views were taken today using Digital Radiography. 2 views left hip.  Hip joint space remains well-maintained.)  X-RAY EXAM OF KNEE 3 VIEWS (69081) (PA flex, Right Lateral and bilateral Datto Patella views were taken today using Digital Radiography. 3 views right knee. Bone-on-bone lateral compartment. Tricompartmental osteophytes.)  MAJOR JOINT - KNEE, HIP, SHOULDER (99683)  GEL-ONE 30 MG / 3.0 ML () (Treatment options were discussed, including risks and benefits. The patient elected to proceed with Visco supplementation injections.  Under sterile prep the patient was injected.)    REVIEW OF SYSTEMS: Systems were reviewed by the provider.(V49.9)

## 2021-04-24 ENCOUNTER — ANESTHESIA EVENT (OUTPATIENT)
Dept: SURGERY | Age: 81
End: 2021-04-24
Payer: MEDICARE

## 2021-04-26 ENCOUNTER — HOSPITAL ENCOUNTER (OUTPATIENT)
Age: 81
Discharge: HOME | End: 2021-04-27
Attending: ORTHOPAEDIC SURGERY | Admitting: ORTHOPAEDIC SURGERY
Payer: MEDICARE

## 2021-04-26 ENCOUNTER — ANESTHESIA (OUTPATIENT)
Dept: SURGERY | Age: 81
End: 2021-04-26
Payer: MEDICARE

## 2021-04-26 DIAGNOSIS — M17.11 PRIMARY OSTEOARTHRITIS OF RIGHT KNEE: Primary | ICD-10-CM

## 2021-04-26 LAB
GLUCOSE BLD STRIP.AUTO-MCNC: 91 MG/DL (ref 65–100)
SERVICE CMNT-IMP: NORMAL

## 2021-04-26 PROCEDURE — 74011000258 HC RX REV CODE- 258: Performed by: ORTHOPAEDIC SURGERY

## 2021-04-26 PROCEDURE — 77030027138 HC INCENT SPIROMETER -A

## 2021-04-26 PROCEDURE — 77030008462 HC STPLR SKN PROX J&J -A: Performed by: ORTHOPAEDIC SURGERY

## 2021-04-26 PROCEDURE — C1776 JOINT DEVICE (IMPLANTABLE): HCPCS | Performed by: ORTHOPAEDIC SURGERY

## 2021-04-26 PROCEDURE — 74011000250 HC RX REV CODE- 250: Performed by: NURSE ANESTHETIST, CERTIFIED REGISTERED

## 2021-04-26 PROCEDURE — 74011000250 HC RX REV CODE- 250: Performed by: PHYSICIAN ASSISTANT

## 2021-04-26 PROCEDURE — 2709999900 HC NON-CHARGEABLE SUPPLY

## 2021-04-26 PROCEDURE — 77030040361 HC SLV COMPR DVT MDII -B

## 2021-04-26 PROCEDURE — 82962 GLUCOSE BLOOD TEST: CPT

## 2021-04-26 PROCEDURE — 77030003601 HC NDL NRV BLK BBMI -A

## 2021-04-26 PROCEDURE — 77030028907 HC WRP KNEE WO BGS SOLM -B

## 2021-04-26 PROCEDURE — 77030019905 HC CATH URETH INTMIT MDII -A: Performed by: ORTHOPAEDIC SURGERY

## 2021-04-26 PROCEDURE — 74011250637 HC RX REV CODE- 250/637: Performed by: ANESTHESIOLOGY

## 2021-04-26 PROCEDURE — 74011250636 HC RX REV CODE- 250/636: Performed by: ANESTHESIOLOGY

## 2021-04-26 PROCEDURE — 2709999900 HC NON-CHARGEABLE SUPPLY: Performed by: ORTHOPAEDIC SURGERY

## 2021-04-26 PROCEDURE — 77030031139 HC SUT VCRL2 J&J -A: Performed by: ORTHOPAEDIC SURGERY

## 2021-04-26 PROCEDURE — C1713 ANCHOR/SCREW BN/BN,TIS/BN: HCPCS | Performed by: ORTHOPAEDIC SURGERY

## 2021-04-26 PROCEDURE — 77030035236 HC SUT PDS STRATFX BARB J&J -B: Performed by: ORTHOPAEDIC SURGERY

## 2021-04-26 PROCEDURE — 74011250636 HC RX REV CODE- 250/636: Performed by: PHYSICIAN ASSISTANT

## 2021-04-26 PROCEDURE — 74011250636 HC RX REV CODE- 250/636: Performed by: ORTHOPAEDIC SURGERY

## 2021-04-26 PROCEDURE — 77030018673: Performed by: ORTHOPAEDIC SURGERY

## 2021-04-26 PROCEDURE — 97530 THERAPEUTIC ACTIVITIES: CPT

## 2021-04-26 PROCEDURE — 74011250637 HC RX REV CODE- 250/637: Performed by: PHYSICIAN ASSISTANT

## 2021-04-26 PROCEDURE — 77030010783 HC BOWL MX BN CEM J&J -B: Performed by: ORTHOPAEDIC SURGERY

## 2021-04-26 PROCEDURE — 77030026438 HC STYL ET INTUB CARD -A: Performed by: ANESTHESIOLOGY

## 2021-04-26 PROCEDURE — 74011250636 HC RX REV CODE- 250/636: Performed by: NURSE ANESTHETIST, CERTIFIED REGISTERED

## 2021-04-26 PROCEDURE — 74011000272 HC RX REV CODE- 272: Performed by: ORTHOPAEDIC SURGERY

## 2021-04-26 PROCEDURE — 77030018831 HC SOL IRR H20 BAXT -A: Performed by: ORTHOPAEDIC SURGERY

## 2021-04-26 PROCEDURE — 77030008684 HC TU ET CUF COVD -B: Performed by: ANESTHESIOLOGY

## 2021-04-26 PROCEDURE — 77030000032 HC CUF TRNQT ZIMM -B: Performed by: ORTHOPAEDIC SURGERY

## 2021-04-26 PROCEDURE — 74011000250 HC RX REV CODE- 250: Performed by: ORTHOPAEDIC SURGERY

## 2021-04-26 PROCEDURE — 76010000162 HC OR TIME 1.5 TO 2 HR INTENSV-TIER 1: Performed by: ORTHOPAEDIC SURGERY

## 2021-04-26 PROCEDURE — 94640 AIRWAY INHALATION TREATMENT: CPT

## 2021-04-26 PROCEDURE — 77030005513 HC CATH URETH FOL11 MDII -B: Performed by: ORTHOPAEDIC SURGERY

## 2021-04-26 PROCEDURE — 97116 GAIT TRAINING THERAPY: CPT

## 2021-04-26 PROCEDURE — 76210000016 HC OR PH I REC 1 TO 1.5 HR: Performed by: ORTHOPAEDIC SURGERY

## 2021-04-26 PROCEDURE — 76060000034 HC ANESTHESIA 1.5 TO 2 HR: Performed by: ORTHOPAEDIC SURGERY

## 2021-04-26 PROCEDURE — 94664 DEMO&/EVAL PT USE INHALER: CPT

## 2021-04-26 PROCEDURE — 77030008463 HC STPLR SKN PROX J&J -B: Performed by: ORTHOPAEDIC SURGERY

## 2021-04-26 PROCEDURE — 97161 PT EVAL LOW COMPLEX 20 MIN: CPT

## 2021-04-26 PROCEDURE — C9290 INJ, BUPIVACAINE LIPOSOME: HCPCS | Performed by: ORTHOPAEDIC SURGERY

## 2021-04-26 PROCEDURE — 77030040922 HC BLNKT HYPOTHRM STRY -A

## 2021-04-26 PROCEDURE — 77030041279 HC DRSG PRMSL AG MDII -B: Performed by: ORTHOPAEDIC SURGERY

## 2021-04-26 PROCEDURE — 77030006822 HC BLD SAW SAG BRSM -B: Performed by: ORTHOPAEDIC SURGERY

## 2021-04-26 DEVICE — IMPLANTABLE DEVICE
Type: IMPLANTABLE DEVICE | Site: KNEE | Status: FUNCTIONAL
Brand: PERSONA® VIVACIT-E®

## 2021-04-26 DEVICE — KNEE K1 TOT HEMI STD CEM IMPL CAPPED K1 ZIM: Type: IMPLANTABLE DEVICE | Site: KNEE | Status: FUNCTIONAL

## 2021-04-26 DEVICE — IMPLANTABLE DEVICE
Type: IMPLANTABLE DEVICE | Site: KNEE | Status: FUNCTIONAL
Brand: PERSONA®

## 2021-04-26 DEVICE — IMPLANTABLE DEVICE
Type: IMPLANTABLE DEVICE | Site: KNEE | Status: FUNCTIONAL
Brand: PERSONA® NATURAL TIBIA®

## 2021-04-26 DEVICE — SMARTSET GHV GENTAMICIN HIGH VISCOSITY BONE CEMENT 40G
Type: IMPLANTABLE DEVICE | Site: KNEE | Status: FUNCTIONAL
Brand: SMARTSET

## 2021-04-26 RX ORDER — OXYCODONE HYDROCHLORIDE 5 MG/1
5 TABLET ORAL
Status: DISCONTINUED | OUTPATIENT
Start: 2021-04-26 | End: 2021-04-27 | Stop reason: HOSPADM

## 2021-04-26 RX ORDER — SODIUM CHLORIDE 0.9 % (FLUSH) 0.9 %
5-40 SYRINGE (ML) INJECTION EVERY 8 HOURS
Status: DISCONTINUED | OUTPATIENT
Start: 2021-04-26 | End: 2021-04-26 | Stop reason: HOSPADM

## 2021-04-26 RX ORDER — FENTANYL CITRATE 50 UG/ML
25 INJECTION, SOLUTION INTRAMUSCULAR; INTRAVENOUS
Status: COMPLETED | OUTPATIENT
Start: 2021-04-26 | End: 2021-04-26

## 2021-04-26 RX ORDER — FENTANYL CITRATE 50 UG/ML
INJECTION, SOLUTION INTRAMUSCULAR; INTRAVENOUS AS NEEDED
Status: DISCONTINUED | OUTPATIENT
Start: 2021-04-26 | End: 2021-04-26 | Stop reason: HOSPADM

## 2021-04-26 RX ORDER — TOLTERODINE 4 MG/1
4 CAPSULE, EXTENDED RELEASE ORAL DAILY
Status: DISCONTINUED | OUTPATIENT
Start: 2021-04-27 | End: 2021-04-27 | Stop reason: HOSPADM

## 2021-04-26 RX ORDER — ACETAMINOPHEN 325 MG/1
650 TABLET ORAL ONCE
Status: COMPLETED | OUTPATIENT
Start: 2021-04-26 | End: 2021-04-26

## 2021-04-26 RX ORDER — ASPIRIN 325 MG
325 TABLET, DELAYED RELEASE (ENTERIC COATED) ORAL 2 TIMES DAILY
Status: DISCONTINUED | OUTPATIENT
Start: 2021-04-26 | End: 2021-04-27 | Stop reason: HOSPADM

## 2021-04-26 RX ORDER — SODIUM CHLORIDE 0.9 % (FLUSH) 0.9 %
5-40 SYRINGE (ML) INJECTION AS NEEDED
Status: DISCONTINUED | OUTPATIENT
Start: 2021-04-26 | End: 2021-04-26 | Stop reason: HOSPADM

## 2021-04-26 RX ORDER — OXYCODONE HYDROCHLORIDE 5 MG/1
10 TABLET ORAL
Status: DISCONTINUED | OUTPATIENT
Start: 2021-04-26 | End: 2021-04-27 | Stop reason: HOSPADM

## 2021-04-26 RX ORDER — SODIUM CHLORIDE 0.9 % (FLUSH) 0.9 %
5-40 SYRINGE (ML) INJECTION AS NEEDED
Status: DISCONTINUED | OUTPATIENT
Start: 2021-04-26 | End: 2021-04-27 | Stop reason: HOSPADM

## 2021-04-26 RX ORDER — ROPIVACAINE HYDROCHLORIDE 5 MG/ML
INJECTION, SOLUTION EPIDURAL; INFILTRATION; PERINEURAL
Status: COMPLETED | OUTPATIENT
Start: 2021-04-26 | End: 2021-04-26

## 2021-04-26 RX ORDER — DIPHENHYDRAMINE HYDROCHLORIDE 50 MG/ML
12.5 INJECTION, SOLUTION INTRAMUSCULAR; INTRAVENOUS AS NEEDED
Status: DISCONTINUED | OUTPATIENT
Start: 2021-04-26 | End: 2021-04-26 | Stop reason: HOSPADM

## 2021-04-26 RX ORDER — ONDANSETRON 2 MG/ML
INJECTION INTRAMUSCULAR; INTRAVENOUS AS NEEDED
Status: DISCONTINUED | OUTPATIENT
Start: 2021-04-26 | End: 2021-04-26 | Stop reason: HOSPADM

## 2021-04-26 RX ORDER — MONTELUKAST SODIUM 10 MG/1
10 TABLET ORAL DAILY
Status: DISCONTINUED | OUTPATIENT
Start: 2021-04-27 | End: 2021-04-27 | Stop reason: HOSPADM

## 2021-04-26 RX ORDER — LIDOCAINE HYDROCHLORIDE 10 MG/ML
0.1 INJECTION, SOLUTION EPIDURAL; INFILTRATION; INTRACAUDAL; PERINEURAL AS NEEDED
Status: DISCONTINUED | OUTPATIENT
Start: 2021-04-26 | End: 2021-04-26 | Stop reason: HOSPADM

## 2021-04-26 RX ORDER — DEXAMETHASONE SODIUM PHOSPHATE 4 MG/ML
INJECTION, SOLUTION INTRA-ARTICULAR; INTRALESIONAL; INTRAMUSCULAR; INTRAVENOUS; SOFT TISSUE AS NEEDED
Status: DISCONTINUED | OUTPATIENT
Start: 2021-04-26 | End: 2021-04-26 | Stop reason: HOSPADM

## 2021-04-26 RX ORDER — ROPIVACAINE HYDROCHLORIDE 5 MG/ML
30 INJECTION, SOLUTION EPIDURAL; INFILTRATION; PERINEURAL ONCE
Status: DISCONTINUED | OUTPATIENT
Start: 2021-04-26 | End: 2021-04-26 | Stop reason: HOSPADM

## 2021-04-26 RX ORDER — HYDROXYZINE HYDROCHLORIDE 10 MG/1
10 TABLET, FILM COATED ORAL
Status: DISCONTINUED | OUTPATIENT
Start: 2021-04-26 | End: 2021-04-27 | Stop reason: HOSPADM

## 2021-04-26 RX ORDER — ROCURONIUM BROMIDE 10 MG/ML
INJECTION, SOLUTION INTRAVENOUS AS NEEDED
Status: DISCONTINUED | OUTPATIENT
Start: 2021-04-26 | End: 2021-04-26 | Stop reason: HOSPADM

## 2021-04-26 RX ORDER — MIDAZOLAM HYDROCHLORIDE 1 MG/ML
1 INJECTION, SOLUTION INTRAMUSCULAR; INTRAVENOUS AS NEEDED
Status: DISCONTINUED | OUTPATIENT
Start: 2021-04-26 | End: 2021-04-26 | Stop reason: HOSPADM

## 2021-04-26 RX ORDER — ONDANSETRON 2 MG/ML
4 INJECTION INTRAMUSCULAR; INTRAVENOUS
Status: DISCONTINUED | OUTPATIENT
Start: 2021-04-26 | End: 2021-04-27 | Stop reason: HOSPADM

## 2021-04-26 RX ORDER — SODIUM CHLORIDE 9 MG/ML
25 INJECTION, SOLUTION INTRAVENOUS CONTINUOUS
Status: DISCONTINUED | OUTPATIENT
Start: 2021-04-26 | End: 2021-04-26 | Stop reason: HOSPADM

## 2021-04-26 RX ORDER — NALOXONE HYDROCHLORIDE 0.4 MG/ML
0.4 INJECTION, SOLUTION INTRAMUSCULAR; INTRAVENOUS; SUBCUTANEOUS AS NEEDED
Status: DISCONTINUED | OUTPATIENT
Start: 2021-04-26 | End: 2021-04-27 | Stop reason: HOSPADM

## 2021-04-26 RX ORDER — MORPHINE SULFATE 2 MG/ML
2 INJECTION, SOLUTION INTRAMUSCULAR; INTRAVENOUS
Status: DISCONTINUED | OUTPATIENT
Start: 2021-04-26 | End: 2021-04-26 | Stop reason: HOSPADM

## 2021-04-26 RX ORDER — ALBUTEROL SULFATE 90 UG/1
2 AEROSOL, METERED RESPIRATORY (INHALATION)
Status: DISCONTINUED | OUTPATIENT
Start: 2021-04-26 | End: 2021-04-26

## 2021-04-26 RX ORDER — SODIUM CHLORIDE, SODIUM LACTATE, POTASSIUM CHLORIDE, CALCIUM CHLORIDE 600; 310; 30; 20 MG/100ML; MG/100ML; MG/100ML; MG/100ML
125 INJECTION, SOLUTION INTRAVENOUS CONTINUOUS
Status: DISCONTINUED | OUTPATIENT
Start: 2021-04-26 | End: 2021-04-26 | Stop reason: HOSPADM

## 2021-04-26 RX ORDER — AMOXICILLIN 250 MG
1 CAPSULE ORAL 2 TIMES DAILY
Status: DISCONTINUED | OUTPATIENT
Start: 2021-04-26 | End: 2021-04-27 | Stop reason: HOSPADM

## 2021-04-26 RX ORDER — IPRATROPIUM BROMIDE 0.5 MG/2.5ML
0.5 SOLUTION RESPIRATORY (INHALATION) DAILY
Status: DISCONTINUED | OUTPATIENT
Start: 2021-04-27 | End: 2021-04-27 | Stop reason: HOSPADM

## 2021-04-26 RX ORDER — POLYETHYLENE GLYCOL 3350 17 G/17G
17 POWDER, FOR SOLUTION ORAL DAILY
Status: DISCONTINUED | OUTPATIENT
Start: 2021-04-27 | End: 2021-04-27 | Stop reason: HOSPADM

## 2021-04-26 RX ORDER — PROPOFOL 10 MG/ML
INJECTION, EMULSION INTRAVENOUS AS NEEDED
Status: DISCONTINUED | OUTPATIENT
Start: 2021-04-26 | End: 2021-04-26 | Stop reason: HOSPADM

## 2021-04-26 RX ORDER — LEVOTHYROXINE SODIUM 100 UG/1
100 TABLET ORAL
Status: DISCONTINUED | OUTPATIENT
Start: 2021-04-27 | End: 2021-04-27 | Stop reason: HOSPADM

## 2021-04-26 RX ORDER — ACETAMINOPHEN 325 MG/1
650 TABLET ORAL EVERY 6 HOURS
Status: DISCONTINUED | OUTPATIENT
Start: 2021-04-26 | End: 2021-04-27 | Stop reason: HOSPADM

## 2021-04-26 RX ORDER — ACETAMINOPHEN 500 MG
1000 TABLET ORAL EVERY 6 HOURS
Status: DISCONTINUED | OUTPATIENT
Start: 2021-04-26 | End: 2021-04-26

## 2021-04-26 RX ORDER — LIDOCAINE HYDROCHLORIDE 20 MG/ML
INJECTION, SOLUTION EPIDURAL; INFILTRATION; INTRACAUDAL; PERINEURAL AS NEEDED
Status: DISCONTINUED | OUTPATIENT
Start: 2021-04-26 | End: 2021-04-26 | Stop reason: HOSPADM

## 2021-04-26 RX ORDER — ALBUTEROL SULFATE 0.83 MG/ML
2.5 SOLUTION RESPIRATORY (INHALATION)
Status: DISCONTINUED | OUTPATIENT
Start: 2021-04-26 | End: 2021-04-27 | Stop reason: HOSPADM

## 2021-04-26 RX ORDER — MIDAZOLAM HYDROCHLORIDE 1 MG/ML
INJECTION, SOLUTION INTRAMUSCULAR; INTRAVENOUS AS NEEDED
Status: DISCONTINUED | OUTPATIENT
Start: 2021-04-26 | End: 2021-04-26 | Stop reason: HOSPADM

## 2021-04-26 RX ORDER — NEOSTIGMINE METHYLSULFATE 1 MG/ML
INJECTION INTRAVENOUS AS NEEDED
Status: DISCONTINUED | OUTPATIENT
Start: 2021-04-26 | End: 2021-04-26 | Stop reason: HOSPADM

## 2021-04-26 RX ORDER — FENTANYL CITRATE 50 UG/ML
50 INJECTION, SOLUTION INTRAMUSCULAR; INTRAVENOUS AS NEEDED
Status: DISCONTINUED | OUTPATIENT
Start: 2021-04-26 | End: 2021-04-26 | Stop reason: HOSPADM

## 2021-04-26 RX ORDER — KETAMINE HYDROCHLORIDE 10 MG/ML
INJECTION, SOLUTION INTRAMUSCULAR; INTRAVENOUS AS NEEDED
Status: DISCONTINUED | OUTPATIENT
Start: 2021-04-26 | End: 2021-04-26 | Stop reason: HOSPADM

## 2021-04-26 RX ORDER — ONDANSETRON 2 MG/ML
4 INJECTION INTRAMUSCULAR; INTRAVENOUS AS NEEDED
Status: DISCONTINUED | OUTPATIENT
Start: 2021-04-26 | End: 2021-04-26 | Stop reason: HOSPADM

## 2021-04-26 RX ORDER — GLYCOPYRROLATE 0.2 MG/ML
INJECTION INTRAMUSCULAR; INTRAVENOUS AS NEEDED
Status: DISCONTINUED | OUTPATIENT
Start: 2021-04-26 | End: 2021-04-26 | Stop reason: HOSPADM

## 2021-04-26 RX ORDER — SODIUM CHLORIDE, SODIUM LACTATE, POTASSIUM CHLORIDE, CALCIUM CHLORIDE 600; 310; 30; 20 MG/100ML; MG/100ML; MG/100ML; MG/100ML
75 INJECTION, SOLUTION INTRAVENOUS CONTINUOUS
Status: DISCONTINUED | OUTPATIENT
Start: 2021-04-26 | End: 2021-04-26 | Stop reason: HOSPADM

## 2021-04-26 RX ORDER — FLUTICASONE PROPIONATE 50 MCG
2 SPRAY, SUSPENSION (ML) NASAL DAILY
Status: DISCONTINUED | OUTPATIENT
Start: 2021-04-27 | End: 2021-04-27 | Stop reason: HOSPADM

## 2021-04-26 RX ORDER — HYDROMORPHONE HYDROCHLORIDE 1 MG/ML
0.2 INJECTION, SOLUTION INTRAMUSCULAR; INTRAVENOUS; SUBCUTANEOUS
Status: DISCONTINUED | OUTPATIENT
Start: 2021-04-26 | End: 2021-04-26 | Stop reason: HOSPADM

## 2021-04-26 RX ORDER — FACIAL-BODY WIPES
10 EACH TOPICAL DAILY PRN
Status: DISCONTINUED | OUTPATIENT
Start: 2021-04-28 | End: 2021-04-27 | Stop reason: HOSPADM

## 2021-04-26 RX ORDER — SODIUM CHLORIDE 0.9 % (FLUSH) 0.9 %
5-40 SYRINGE (ML) INJECTION EVERY 8 HOURS
Status: DISCONTINUED | OUTPATIENT
Start: 2021-04-26 | End: 2021-04-27 | Stop reason: HOSPADM

## 2021-04-26 RX ORDER — HYDROMORPHONE HYDROCHLORIDE 1 MG/ML
1 INJECTION, SOLUTION INTRAMUSCULAR; INTRAVENOUS; SUBCUTANEOUS
Status: DISCONTINUED | OUTPATIENT
Start: 2021-04-26 | End: 2021-04-27 | Stop reason: HOSPADM

## 2021-04-26 RX ORDER — SODIUM CHLORIDE 9 MG/ML
125 INJECTION, SOLUTION INTRAVENOUS CONTINUOUS
Status: DISPENSED | OUTPATIENT
Start: 2021-04-26 | End: 2021-04-27

## 2021-04-26 RX ORDER — HYDROMORPHONE HYDROCHLORIDE 1 MG/ML
INJECTION, SOLUTION INTRAMUSCULAR; INTRAVENOUS; SUBCUTANEOUS AS NEEDED
Status: DISCONTINUED | OUTPATIENT
Start: 2021-04-26 | End: 2021-04-26 | Stop reason: HOSPADM

## 2021-04-26 RX ORDER — FAMOTIDINE 20 MG/1
20 TABLET, FILM COATED ORAL 2 TIMES DAILY
Status: DISCONTINUED | OUTPATIENT
Start: 2021-04-26 | End: 2021-04-27 | Stop reason: HOSPADM

## 2021-04-26 RX ORDER — MIDAZOLAM HYDROCHLORIDE 1 MG/ML
0.5 INJECTION, SOLUTION INTRAMUSCULAR; INTRAVENOUS
Status: DISCONTINUED | OUTPATIENT
Start: 2021-04-26 | End: 2021-04-26 | Stop reason: HOSPADM

## 2021-04-26 RX ADMIN — ACETAMINOPHEN 650 MG: 325 TABLET, FILM COATED ORAL at 20:40

## 2021-04-26 RX ADMIN — ROCURONIUM BROMIDE 40 MG: 10 SOLUTION INTRAVENOUS at 09:06

## 2021-04-26 RX ADMIN — ARFORMOTEROL TARTRATE: 15 SOLUTION RESPIRATORY (INHALATION) at 21:00

## 2021-04-26 RX ADMIN — ACETAMINOPHEN 650 MG: 325 TABLET, FILM COATED ORAL at 15:05

## 2021-04-26 RX ADMIN — ROPIVACAINE HYDROCHLORIDE 30 ML: 5 INJECTION, SOLUTION EPIDURAL; INFILTRATION; PERINEURAL at 09:21

## 2021-04-26 RX ADMIN — MIDAZOLAM 2 MG: 1 INJECTION INTRAMUSCULAR; INTRAVENOUS at 09:03

## 2021-04-26 RX ADMIN — MIDAZOLAM HYDROCHLORIDE 2 MG: 1 INJECTION, SOLUTION INTRAMUSCULAR; INTRAVENOUS at 08:45

## 2021-04-26 RX ADMIN — FENTANYL CITRATE 25 MCG: 50 INJECTION, SOLUTION INTRAMUSCULAR; INTRAVENOUS at 11:05

## 2021-04-26 RX ADMIN — SODIUM CHLORIDE, POTASSIUM CHLORIDE, SODIUM LACTATE AND CALCIUM CHLORIDE 125 ML/HR: 600; 310; 30; 20 INJECTION, SOLUTION INTRAVENOUS at 08:39

## 2021-04-26 RX ADMIN — OXYCODONE 5 MG: 5 TABLET ORAL at 16:40

## 2021-04-26 RX ADMIN — FENTANYL CITRATE 25 MCG: 50 INJECTION, SOLUTION INTRAMUSCULAR; INTRAVENOUS at 11:40

## 2021-04-26 RX ADMIN — ONDANSETRON HYDROCHLORIDE 4 MG: 2 INJECTION, SOLUTION INTRAMUSCULAR; INTRAVENOUS at 10:31

## 2021-04-26 RX ADMIN — DOCUSATE SODIUM 50 MG AND SENNOSIDES 8.6 MG 1 TABLET: 8.6; 5 TABLET, FILM COATED ORAL at 13:42

## 2021-04-26 RX ADMIN — ACETAMINOPHEN 650 MG: 325 TABLET ORAL at 08:31

## 2021-04-26 RX ADMIN — KETAMINE HYDROCHLORIDE 30 MG: 10 INJECTION, SOLUTION INTRAMUSCULAR; INTRAVENOUS at 09:06

## 2021-04-26 RX ADMIN — HYDROMORPHONE HYDROCHLORIDE 0.5 MG: 1 INJECTION, SOLUTION INTRAMUSCULAR; INTRAVENOUS; SUBCUTANEOUS at 10:55

## 2021-04-26 RX ADMIN — HYDROMORPHONE HYDROCHLORIDE 0.5 MG: 1 INJECTION, SOLUTION INTRAMUSCULAR; INTRAVENOUS; SUBCUTANEOUS at 10:36

## 2021-04-26 RX ADMIN — SODIUM CHLORIDE: 900 INJECTION, SOLUTION INTRAVENOUS at 10:50

## 2021-04-26 RX ADMIN — FENTANYL CITRATE 50 MCG: 50 INJECTION, SOLUTION INTRAMUSCULAR; INTRAVENOUS at 08:45

## 2021-04-26 RX ADMIN — FAMOTIDINE 20 MG: 20 TABLET ORAL at 13:42

## 2021-04-26 RX ADMIN — NEOSTIGMINE METHYLSULFATE 3 MG: 1 INJECTION, SOLUTION INTRAVENOUS at 10:44

## 2021-04-26 RX ADMIN — MORPHINE SULFATE 2 MG: 2 INJECTION, SOLUTION INTRAMUSCULAR; INTRAVENOUS at 12:00

## 2021-04-26 RX ADMIN — OXYCODONE 5 MG: 5 TABLET ORAL at 20:40

## 2021-04-26 RX ADMIN — PHENYLEPHRINE HYDROCHLORIDE 40 MCG/MIN: 10 INJECTION INTRAVENOUS at 09:13

## 2021-04-26 RX ADMIN — MIDAZOLAM HYDROCHLORIDE 0.5 MG: 1 INJECTION, SOLUTION INTRAMUSCULAR; INTRAVENOUS at 11:05

## 2021-04-26 RX ADMIN — ROCURONIUM BROMIDE 10 MG: 10 SOLUTION INTRAVENOUS at 10:05

## 2021-04-26 RX ADMIN — GLYCOPYRROLATE 0.4 MG: 0.2 INJECTION, SOLUTION INTRAMUSCULAR; INTRAVENOUS at 10:44

## 2021-04-26 RX ADMIN — FENTANYL CITRATE 25 MCG: 50 INJECTION, SOLUTION INTRAMUSCULAR; INTRAVENOUS at 11:10

## 2021-04-26 RX ADMIN — MORPHINE SULFATE 2 MG: 2 INJECTION, SOLUTION INTRAMUSCULAR; INTRAVENOUS at 11:53

## 2021-04-26 RX ADMIN — FENTANYL CITRATE 50 MCG: 50 INJECTION, SOLUTION INTRAMUSCULAR; INTRAVENOUS at 09:37

## 2021-04-26 RX ADMIN — LIDOCAINE HYDROCHLORIDE 60 MG: 20 INJECTION, SOLUTION EPIDURAL; INFILTRATION; INTRACAUDAL; PERINEURAL at 09:06

## 2021-04-26 RX ADMIN — FAMOTIDINE 20 MG: 20 TABLET ORAL at 17:28

## 2021-04-26 RX ADMIN — ASPIRIN 325 MG: 325 TABLET, COATED ORAL at 18:03

## 2021-04-26 RX ADMIN — MORPHINE SULFATE 2 MG: 2 INJECTION, SOLUTION INTRAMUSCULAR; INTRAVENOUS at 11:45

## 2021-04-26 RX ADMIN — OXYCODONE 5 MG: 5 TABLET ORAL at 13:42

## 2021-04-26 RX ADMIN — SODIUM CHLORIDE 125 ML/HR: 9 INJECTION, SOLUTION INTRAVENOUS at 15:50

## 2021-04-26 RX ADMIN — WATER 2 G: 1 INJECTION INTRAMUSCULAR; INTRAVENOUS; SUBCUTANEOUS at 09:22

## 2021-04-26 RX ADMIN — FENTANYL CITRATE 25 MCG: 50 INJECTION, SOLUTION INTRAMUSCULAR; INTRAVENOUS at 11:25

## 2021-04-26 RX ADMIN — CEFAZOLIN SODIUM 2 G: 1 INJECTION, POWDER, FOR SOLUTION INTRAMUSCULAR; INTRAVENOUS at 16:41

## 2021-04-26 RX ADMIN — PROPOFOL 120 MG: 10 INJECTION, EMULSION INTRAVENOUS at 09:06

## 2021-04-26 RX ADMIN — MORPHINE SULFATE 2 MG: 2 INJECTION, SOLUTION INTRAMUSCULAR; INTRAVENOUS at 12:25

## 2021-04-26 RX ADMIN — DEXAMETHASONE SODIUM PHOSPHATE 8 MG: 4 INJECTION, SOLUTION INTRAMUSCULAR; INTRAVENOUS at 09:17

## 2021-04-26 RX ADMIN — DOCUSATE SODIUM 50 MG AND SENNOSIDES 8.6 MG 1 TABLET: 8.6; 5 TABLET, FILM COATED ORAL at 17:28

## 2021-04-26 RX ADMIN — FENTANYL CITRATE 50 MCG: 50 INJECTION, SOLUTION INTRAMUSCULAR; INTRAVENOUS at 09:06

## 2021-04-26 NOTE — PROGRESS NOTES
TRANSFER - IN REPORT:    Verbal report received from Aquiles White RN(name) on [facility] [place]  being received from PACU(unit) for routine post - op      Report consisted of patients Situation, Background, Assessment and   Recommendations(SBAR). Information from the following report(s) SBAR, Kardex, OR Summary, Procedure Summary, Intake/Output, MAR and Recent Results was reviewed with the receiving nurse. Opportunity for questions and clarification was provided. Assessment completed upon patients arrival to unit and care assumed.

## 2021-04-26 NOTE — PROGRESS NOTES
Bedside and Verbal shift change report given to Shannan Serrano (oncoming nurse) by Lauren Rivera (offgoing nurse). Report included the following information SBAR, Kardex, OR Summary, Intake/Output and MAR.

## 2021-04-26 NOTE — PROGRESS NOTES
Problem: Mobility Impaired (Adult and Pediatric)  Goal: *Acute Goals and Plan of Care (Insert Text)  Description: FUNCTIONAL STATUS PRIOR TO ADMISSION: Patient was independent and active without use of DME.    HOME SUPPORT PRIOR TO ADMISSION: The patient lived with  but did not require assist.    Physical Therapy Goals  Initiated 4/26/2021    1. Patient will move from supine to sit and sit to supine , scoot up and down, and roll side to side in bed with modified independence within 4 days. 2. Patient will perform sit to stand with modified independence within 4 days. 3. Patient will ambulate with modified independence for 150 feet with the least restrictive device within 4 days. 4. Patient will ascend/descend 4 stairs with Cane and one handrail(s) with modified independence within 4 days. 5. Patient will perform home exercise program per protocol with independence within 4 days. 6. Patient will demonstrate AROM 0-90 degrees in operative joint within 4 days. Outcome: Progressing Towards Goal   PHYSICAL THERAPY EVALUATION  Patient: Vicente Spring (70 y.o. female)  Date: 4/26/2021  Primary Diagnosis: Osteoarthritis of right knee [M17.11]  Procedure(s) (LRB):  RIGHT TOTAL KNEE ARTHROPLASTY (Right) Day of Surgery   Precautions:   Fall, WBAT    ASSESSMENT  Based on the objective data described below, the patient presents with  impairment in functional mobility, activity tolerance and balance s/p R TKA. Patient has had R ISATU in 2018 and cervical fusion in 2011. PLOF: Independent with ADLs and IADLs. Patient lives with  in a one story home with 4 steps and rail to enter. Patient's mobility was on target for POD#0. Will address more exercises, increase gait distance, negotiate stairs and assess for discharge at am PT session tomorrow. Patient instructed NOT to get up from bed, chair or commode without calling for assistance.  Initiated post TKA exercise protocol and wrote same on [employer]. Anticipate discharge after 1-2 more PT sessions. Current Level of Function Impacting Discharge (mobility/balance): Minimal assistance to manage RLE with supine-sit; Contact guard assistance for remaining mobility. Ambulated 54 ft with RW and gait belt, steady and slightly antalgic. Functional Outcome Measure: The patient scored 55/100 on the Barthel outcome measure which is indicative of moderate impaired ability to care for basic self-needs/dependency on others. .      Other factors to consider for discharge: Motivated/A & O x 4/Supportive Family/Independent PLOF      Patient will benefit from skilled therapy intervention to address the above noted impairments. PLAN :  Recommendations and Planned Interventions: bed mobility training, transfer training, gait training, therapeutic exercises, patient and family training/education, and therapeutic activities      Frequency/Duration: Patient will be followed by physical therapy:  twice daily to address goals. Recommendation for discharge: (in order for the patient to meet his/her long term goals)  Physical therapy at least 2 days/week in the home     This discharge recommendation:  Has been made in collaboration with the attending provider and/or case management    IF patient discharges home will need the following DME: patient owns DME required for discharge         SUBJECTIVE:   Patient stated I guess it is time to get up.     OBJECTIVE DATA SUMMARY:   HISTORY:    Past Medical History:   Diagnosis Date    Acid reflux     Acoustic neuroma ([address] 05/2012    GAMMA KNIFE DR Meri Moise    Arthritis     Chronic obstructive pulmonary disease (HCC)     Chronic pain     left  leg    GERD (gastroesophageal reflux disease)     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     Hypothyroid     Other ill-defined conditions(799.89)     cavinous sinus thrombosis    Shingles 2014    Stroke ([address] 08/2012    CAVERNOUS SINUS THROMBOSIS; RIGHT HAND WEAKNESS. Past Surgical History:   Procedure Laterality Date    [facility] OBTURATOR SYS TVT GYNECA  2008    HX CATARACT REMOVAL  2014    HX CERVICAL FUSION  2011    C5-7    HX CHOLECYSTECTOMY  2010    HX COLONOSCOPY  2016    HX GYN  2008    COLOPORRHAPHY    HX HEART CATHETERIZATION  2016    NEGATIVE PER PATIENT    HX HEENT Left 2017    BONE INDUCTION FOR HEARING AID    HX HIP REPLACEMENT Right 04/2018    DR SOLOMON    HX HYSTERECTOMY  1976    HX LUMBAR FUSION  2014    HX ORTHOPAEDIC  2006    TRIGGER FINGER    HX OTHER SURGICAL  2012    GAMMA KNIFE RADIATION    HX REFRACTIVE SURGERY Bilateral 2003    LASIK    HX TONSILLECTOMY  1948    MI COMBINED ANT/POST COLPORRHAPHY  2008    MI REMOVAL OF ANAL FISSURE  1965       Personal factors and/or comorbidities impacting plan of care:  Motivated/A & O x 4/Supportive Family/Independent PLOF     Home Situation  Home Environment: Private residence  # Steps to Enter: 4  Rails to Enter: Yes  Hand Rails : Right  Wheelchair Ramp: No  One/Two Story Residence: One story  Living Alone: No  Support Systems: Spouse/Significant Other/Partner  Patient Expects to be Discharged to[de-identified] Private residence  Current DME Used/Available at Home: Cane, straight, Raised toilet seat, Safety frame toliet, Shower chair, Walker, rolling  Tub or Shower Type: Shower    EXAMINATION/PRESENTATION/DECISION MAKING:   Critical Behavior:   A & O x 4  Appropriate safety awareness and decision making        Range Of Motion:  AROM: Generally decreased, functional           PROM: Generally decreased, functional           Strength:    Strength: Generally decreased, functional                    Tone & Sensation:   Tone: Normal              Sensation: Intact               Coordination:  Coordination: Within functional limits  Vision:      Functional Mobility:  Bed Mobility:     Supine to Sit: Minimum assistance(assist to manage RLE)  Sit to Supine: Contact guard assistance  Scooting: Contact guard assistance  Transfers:  Sit to Stand: Contact guard assistance  Stand to Sit: Contact guard assistance                       Balance:   Sitting: Intact  Standing: Impaired; Without support  Standing - Static: Good;Constant support  Standing - Dynamic : Good;Constant support  Ambulation/Gait Training:  Distance (ft): 55 Feet (ft)  Assistive Device: Walker, rolling;Gait belt  Ambulation - Level of Assistance: Contact guard assistance        Gait Abnormalities: Antalgic;Decreased step clearance; Step to gait  Right Side Weight Bearing: As tolerated           Therapeutic Exercises: Ankle Pumps  Ham Sets  Quad Sets  Heel Slides  X 10 each every hour     Functional Measure:  Barthel Index:    Bathin  Bladder: 10  Bowels: 10  Groomin  Dressin  Feeding: 10  Mobility: 0  Stairs: 0  Toilet Use: 5  Transfer (Bed to Chair and Back): 10  Total: 55/100       The Barthel ADL Index: Guidelines  1. The index should be used as a record of what a patient does, not as a record of what a patient could do. 2. The main aim is to establish degree of independence from any help, physical or verbal, however minor and for whatever reason. 3. The need for supervision renders the patient not independent. 4. A patient's performance should be established using the best available evidence. Asking the patient, friends/relatives and nurses are the usual sources, but direct observation and common sense are also important. However direct testing is not needed. 5. Usually the patient's performance over the preceding 24-48 hours is important, but occasionally longer periods will be relevant. 6. Middle categories imply that the patient supplies over 50 per cent of the effort. 7. Use of aids to be independent is allowed. Edgard Castañeda, Barthel, D.W. (7504). Functional evaluation: the Barthel Index. [facility] (14)2. ELAINE Kee, Florinda Medina., Jessica Spicer., Moose Barroso, [address] [place] [address] (). Measuring the change indisability after inpatient rehabilitation; comparison of the responsiveness of the Barthel Index and Functional [place] Measure. Journal of Neurology, Neurosurgery, and Psychiatry, 66(4), 989-723. KEZIA Mane, JAI Baumann, & Rahat Mitchell M.A. (2004.) Assessment of post-stroke quality of life in cost-effectiveness studies: The usefulness of the Barthel Index and the EuroQoL-5D. Quality of Life Research, 15, 540-65          Physical Therapy Evaluation Charge Determination   History Examination Presentation Decision-Making   LOW Complexity : Zero comorbidities / personal factors that will impact the outcome / POC LOW Complexity : 1-2 Standardized tests and measures addressing body structure, function, activity limitation and / or participation in recreation  LOW Complexity : Stable, uncomplicated  LOW Complexity : FOTO score of       Based on the above components, the patient evaluation is determined to be of the following complexity level: LOW     Pain Ratin/10    Activity Tolerance:   Good   Vitals:    21 1200 21 1254 21 1400 21 1500   BP: 130/62 124/86 (!) 144/66 (!) 146/75   BP 1 Location:  Right upper arm Right upper arm Right upper arm   BP Patient Position:       Pulse: 76 81 69 90   Resp: 19 18 16 16   Temp:  98.1 °F (36.7 °C) 97.8 °F (36.6 °C) 98.8 °F (37.1 °C)   SpO2: 98% 91% 95% 93%   Weight:       Height:           After treatment patient left in no apparent distress:   Supine in bed, Call bell within reach, Caregiver / family present, Side rails x 3, and nurse notified. COMMUNICATION/EDUCATION:   The patients plan of care was discussed with: Registered nurse. Fall prevention education was provided and the patient/caregiver indicated understanding., Patient/family have participated as able in goal setting and plan of care. , and Patient/family agree to work toward stated goals and plan of care.     Thank you for this referral.  Berta Velasquez   Time Calculation: 35 mins

## 2021-04-26 NOTE — ANESTHESIA PROCEDURE NOTES
Peripheral Block    Start time: 4/26/2021 8:41 AM  End time: 4/26/2021 8:45 AM  Performed by: Miranda Altamirano MD  Authorized by: Miranda Altamirano MD       Pre-procedure: Indications: at surgeon's request and post-op pain management    Preanesthetic Checklist: patient identified, risks and benefits discussed, site marked, timeout performed and patient being monitored    Timeout Time: 08:41          Block Type:   Block Type:   Adductor canal  Laterality:  Right  Monitoring:  Standard ASA monitoring, continuous pulse ox, frequent vital sign checks, heart rate, responsive to questions and oxygen  Injection Technique:  Single shot  Procedures: ultrasound guided    Patient Position: supine  Prep: DuraPrep    Needle Type:  Stimuplex  Needle Gauge:  22 G  Needle Localization:  Ultrasound guidance  Medication Injected:  Ropivacaine (PF) (NAROPIN)(0.5%) 5 mg/mL injection, 30 mL    Assessment:  Number of attempts:  1  Injection Assessment:  Incremental injection every 5 mL, local visualized surrounding nerve on ultrasound, negative aspiration for blood, no paresthesia and no intravascular symptoms  Patient tolerance:  Patient tolerated the procedure well with no immediate complications

## 2021-04-26 NOTE — ANESTHESIA POSTPROCEDURE EVALUATION
Post-Anesthesia Evaluation and Assessment    Patient: Lorne Snow MRN: [id number]  SSN: [id number]    YOB: 1940  Age: 68 y.o. Sex: female      I have evaluated the patient and they are stable and ready for discharge from the PACU. Cardiovascular Function/Vital Signs  Visit Vitals  BP (!) 146/65 (BP 1 Location: Right upper arm, BP Patient Position: At rest)   Pulse 72   Temp 36.4 °C (97.6 °F)   Resp 13   Ht 5' 1\" (1.549 m)   Wt 63 kg (138 lb 14 oz)   SpO2 98%   BMI 26.24 kg/m²       Patient is status post General anesthesia for Procedure(s):  RIGHT TOTAL KNEE ARTHROPLASTY. Nausea/Vomiting: None    Postoperative hydration reviewed and adequate. Pain:  Pain Scale 1: Numeric (0 - 10) (04/26/21 1145)  Pain Intensity 1: 6 (04/26/21 1145)   Managed    Neurological Status:   Neuro (WDL): Exceptions to WDL (04/26/21 1145)  Neuro  RLE Motor Response: Weak (04/26/21 1145)   At baseline    Mental Status, Level of Consciousness: Alert and  oriented to person, place, and time    Pulmonary Status:   O2 Device: Nasal cannula (04/26/21 1145)   Adequate oxygenation and airway patent    Complications related to anesthesia: None    Post-anesthesia assessment completed. No concerns    Signed By: Edwardo Clayton MD     April 26, 2021              Procedure(s):  RIGHT TOTAL KNEE ARTHROPLASTY. general    <BSHSIANPOST>    INITIAL Post-op Vital signs:   Vitals Value Taken Time   /65 04/26/21 1145   Temp 36.4 °C (97.6 °F) 04/26/21 1145   Pulse 72 04/26/21 1154   Resp 19 04/26/21 1154   SpO2 98 % 04/26/21 1154   Vitals shown include unvalidated device data.

## 2021-04-26 NOTE — PROGRESS NOTES
Ortho Post-Op Note    4/26/2021  4:18 PM    POD:  Day of Surgery  S/P:  Procedure(s):  RIGHT TOTAL KNEE ARTHROPLASTY    Afebrile/VSS, NAD, A&O x 3  Doing well without complaints of nausea  Pain well controlled  Calves soft/NTTP Bilaterally  Leg soft. Dressing clean and dry  Moving lower extremities well. Neurocirculatory exam intact and within normal range. Lab Results   Component Value Date/Time    HGB 12.7 04/14/2021 11:53 AM    INR 1.0 04/14/2021 11:53 AM     Recent Labs     04/14/21  1153   CREA 0.75   BUN 20     Estimated Creatinine Clearance: 52.6 mL/min (by C-G formula based on SCr of 0.75 mg/dL).   Visit Vitals  /71 (BP 1 Location: Right upper arm)   Pulse 100   Temp 97.9 °F (36.6 °C)   Resp 16   Ht 5' 1\" (1.549 m)   Wt 63 kg (138 lb 14 oz)   SpO2 94%   BMI 26.24 kg/m²       PLAN:  DVT prophylaxis:  mg bid  WBAT with PT-mobilization  Pain Control: Tylenol, oxycodone, dilaudid  Plan to D/C home in 1-2 days       Brooklyn Mcmahan, [address], [address]    Department of Orthopaedics  [phone]

## 2021-04-26 NOTE — H&P
Date of Surgery Update:  Zhou Perez was seen and examined. History and physical has been reviewed. The patient has been examined.  There have been no significant clinical changes since the completion of the originally dated History and Physical.    Signed By: Bandar John MD     April 26, 2021 7:22 AM

## 2021-04-26 NOTE — PROGRESS NOTES
Primary Nurse Raysa Tanner and Annita Craig RN performed a dual skin assessment on this patient. Impairment noted as follows:    L heel red/blanchable      Alfonso score is 19        1640-Bedside and Verbal shift change report given to Annita Craig RN (oncoming nurse) by Jimi Hughes RN (offgoing nurse). Report included the following information SBAR, OR Summary, Procedure Summary, Intake/Output, MAR and Recent Results.

## 2021-04-26 NOTE — PERIOP NOTES
TRANSFER - OUT REPORT:    Verbal report given to Shiva Kyle RN (name) on Jeffy Colon  being transferred to  (unit) for routine post - op       Report consisted of patients Situation, Background, Assessment and   Recommendations(SBAR). Time Pre op antibiotic given: 0922  Anesthesia Stop time: 1059  Osorio Present on Transfer to floor: No  Order for Osorio on Chart: N/A  Discharge Prescriptions with Chart: N/A    Information from the following report(s) OR Summary, Intake/Output, MAR, Recent Results, Med Rec Status and Cardiac Rhythm SR was reviewed with the receiving nurse. Opportunity for questions and clarification was provided. Is the patient on 02? YES       L/Min 2    Is the patient on a monitor? NO    Is the nurse transporting with the patient? NO    Surgical Waiting Area notified of patient's transfer from PACU? YES      The following personal items collected during your admission accompanied patient upon transfer:   Dental Appliance: Dental Appliances: None  Vision:    Hearing Aid: Hearing Aid: Left  Jewelry:    Clothing: Clothing: Other (comment)(clothing bag returned to patient in PACU)  Other Valuables:  Other Valuables: Eyeglasses(returned to patient in PACU)  Valuables sent to safe:

## 2021-04-26 NOTE — ANESTHESIA PREPROCEDURE EVALUATION
Anesthetic History   No history of anesthetic complications            Review of Systems / Medical History  Patient summary reviewed, nursing notes reviewed and pertinent labs reviewed    Pulmonary            Asthma        Neuro/Psych       CVA       Cardiovascular  Within defined limits                     GI/Hepatic/Renal  Within defined limits              Endo/Other      Hypothyroidism  Arthritis     Other Findings              Physical Exam    Airway  Mallampati: II  TM Distance: > 6 cm  Neck ROM: normal range of motion   Mouth opening: Normal     Cardiovascular  Regular rate and rhythm,  S1 and S2 normal,  no murmur, click, rub, or gallop             Dental  No notable dental hx       Pulmonary  Breath sounds clear to auscultation               Abdominal  GI exam deferred       Other Findings            Anesthetic Plan    ASA: 3  Anesthesia type: general      Post-op pain plan if not by surgeon: peripheral nerve block single    Induction: Intravenous  Anesthetic plan and risks discussed with: Patient

## 2021-04-26 NOTE — OP NOTES
Name: Jw Ramos  MRN:  [id number]  : 1940  Age:  68 y.o. Surgery Date: 2021      OPERATIVE REPORT - RIGHT TOTAL KNEE REPLACEMENT    PREOPERATIVE DIAGNOSIS: Osteoarthritis, right knee. POSTOPERATIVE DIAGNOSIS: Osteoarthritis, right knee. PROCEDURE PERFORMED: Right total knee arthroplasty. SURGEON: Tito Hammond MD    FIRST ASSISTANT:   Noé Jacques PA-C    ANESTHESIA: General    PRE-OP ANTIBIOTIC: Ancef 2g    COMPLICATIONS: None. ESTIMATED BLOOD LOSS: 50 mL. SPECIMENS REMOVED: None. COMPONENTS IMPLANTED:   Implant Name Type Inv. Item Serial No.  Lot No. LRB No. Used Action   CEMENT BNE 40GM FULL DOSE PMMA W/ GENT HI VISC RADPQ LNG - SN/A  CEMENT BNE 40GM FULL DOSE PMMA W/ GENT HI VISC RADPQ LNG N/A [facility] [employer]_ [id number] Right 1 Implanted   CEMENT BNE 40GM FULL DOSE PMMA W/ GENT HI VISC RADPQ LNG - SN/A  CEMENT BNE 40GM FULL DOSE PMMA W/ GENT HI VISC RADPQ LNG N/A [facility] [employer]_ [id number] Right 1 Implanted   COMPONENT PAT [id number] THK8. 5MM STD KNEE VIVACIT-E RONALD - SN/A  COMPONENT PAT [id number] THK8. 5MM STD KNEE VIVACIT-E RONALD N/A [facility] [employer]_ [id number] Right 1 Implanted   PSN MC VE ASF R 10MM 6-7/EF - SN/A  PSN MC VE ASF R 10MM 6-7/EF N/A [employer]_ [id number] Right 1 Implanted   COMPONENT FEM SZ 7 THERESA R KNEE CO CHROM RONALD CRUCE RET COR - SN/A  COMPONENT FEM SZ 7 THERESA R KNEE CO CHROM RONALD CRUCE RET COR N/A [employer]_ [id number] Right 1 Implanted   persona the personalized knee system, natural tibia, cemented   N/A [employer] [id number] Right 1 Implanted       INDICATIONS: The patient is an 68 yrs female with progressive debilitating right knee pain due to severe osteoarthritis. Symptoms have progressed despite comprehensive conservative treatment and the patient presents for right total knee replacement.  Risks, benefits, alternatives of the procedure were reviewed in detail and the patient elects to proceed. The patient understands the risk for perioperative medical complications. DESCRIPTION OF PROCEDURE: Spinal anesthesia was initiated. Preoperative dose of antibiotic was given. Osorio catheter was not placed. The right lower extremity was prepped and draped in the usual sterile fashion. The skin was covered with Ioban occlusive dressing. The right lower extremity was exsanguinated. A medial parapatellar incision was made to the right knee. The right knee was exposed and the distal femur was cut at 5 degrees distal femoral valgus. Femur was sized for a size 7. An AP cutting block was placed, rotated based on bony landmarks. Femoral cuts were completed for a size 7. The tibia was subluxed and a neutral varus/valgus proximal tibial cut was made matching the patient's slope. The meniscal remnants were removed. The posterior osteophytes were removed from the femur. Gaps were examined. The flexion and extension gaps were 10 mm. The femur was finished for a 7, tibia for a E. Trials were placed. Patella was cut and a 32 mm trial was fitted . The knee tracked well with all trial implants. The trials were then removed. Bony surfaces were drilled, lavaged, and dried. All components were cemented. Excess cement was removed. A 10 polyethylene component was placed. After the cement had fully cured, the knee was ranged and  irrigated copiously with pulsatile lavage. All surrounding soft tissues were infiltrated with local anesthetic. The arthrotomy was closed with #2 Vicryl sutures and 0 Vicryl sutures. Skin and subcutaneous tissues were irrigated and closed in standard fashion. Sterile dressing was applied. There were no complications. The procedure was a RIGHT TOTAL KNEE REPLACEMENT. A Yamil total knee construct was utilized. No specimens were obtained/sent. The patient was transferred to the recovery room in stable condition. ITB release for valgus deformity.     Noé Jacques PA-C was critical throughout the case to assist with positioning, retraction and closure. There were no other available residents or surgical assistants to assist during this procedure.       Landon Hernandez MD

## 2021-04-27 VITALS
RESPIRATION RATE: 17 BRPM | OXYGEN SATURATION: 92 % | TEMPERATURE: 98.9 F | HEART RATE: 90 BPM | DIASTOLIC BLOOD PRESSURE: 54 MMHG | SYSTOLIC BLOOD PRESSURE: 118 MMHG | HEIGHT: 61 IN | BODY MASS INDEX: 26.22 KG/M2 | WEIGHT: 138.88 LBS

## 2021-04-27 LAB
ANION GAP SERPL CALC-SCNC: 6 MMOL/L (ref 5–15)
BUN SERPL-MCNC: 10 MG/DL (ref 6–20)
BUN/CREAT SERPL: 14 (ref 12–20)
CALCIUM SERPL-MCNC: 8.4 MG/DL (ref 8.5–10.1)
CHLORIDE SERPL-SCNC: 107 MMOL/L (ref 97–108)
CO2 SERPL-SCNC: 25 MMOL/L (ref 21–32)
CREAT SERPL-MCNC: 0.72 MG/DL (ref 0.55–1.02)
GLUCOSE SERPL-MCNC: 103 MG/DL (ref 65–100)
HGB BLD-MCNC: 9.7 G/DL (ref 11.5–16)
POTASSIUM SERPL-SCNC: 4.1 MMOL/L (ref 3.5–5.1)
SODIUM SERPL-SCNC: 138 MMOL/L (ref 136–145)

## 2021-04-27 PROCEDURE — 74011250637 HC RX REV CODE- 250/637: Performed by: PHYSICIAN ASSISTANT

## 2021-04-27 PROCEDURE — 74011250636 HC RX REV CODE- 250/636: Performed by: PHYSICIAN ASSISTANT

## 2021-04-27 PROCEDURE — 85018 HEMOGLOBIN: CPT

## 2021-04-27 PROCEDURE — 80048 BASIC METABOLIC PNL TOTAL CA: CPT

## 2021-04-27 PROCEDURE — 36415 COLL VENOUS BLD VENIPUNCTURE: CPT

## 2021-04-27 PROCEDURE — 74011000250 HC RX REV CODE- 250: Performed by: PHYSICIAN ASSISTANT

## 2021-04-27 PROCEDURE — 97116 GAIT TRAINING THERAPY: CPT

## 2021-04-27 RX ORDER — OXYCODONE HYDROCHLORIDE 5 MG/1
5 TABLET ORAL
Qty: 50 TAB | Refills: 0 | Status: SHIPPED | OUTPATIENT
Start: 2021-04-27 | End: 2021-05-11

## 2021-04-27 RX ORDER — ASPIRIN 325 MG
325 TABLET, DELAYED RELEASE (ENTERIC COATED) ORAL 2 TIMES DAILY
Qty: 60 TAB | Refills: 0 | Status: ON HOLD | OUTPATIENT
Start: 2021-04-27

## 2021-04-27 RX ADMIN — DOCUSATE SODIUM 50 MG AND SENNOSIDES 8.6 MG 1 TABLET: 8.6; 5 TABLET, FILM COATED ORAL at 08:33

## 2021-04-27 RX ADMIN — OXYCODONE 5 MG: 5 TABLET ORAL at 10:39

## 2021-04-27 RX ADMIN — TOLTERODINE 4 MG: 4 CAPSULE, EXTENDED RELEASE ORAL at 08:35

## 2021-04-27 RX ADMIN — ACETAMINOPHEN 650 MG: 325 TABLET, FILM COATED ORAL at 02:53

## 2021-04-27 RX ADMIN — FLUTICASONE PROPIONATE 2 SPRAY: 50 SPRAY, METERED NASAL at 08:33

## 2021-04-27 RX ADMIN — OXYCODONE 5 MG: 5 TABLET ORAL at 00:14

## 2021-04-27 RX ADMIN — ASPIRIN 325 MG: 325 TABLET, COATED ORAL at 08:33

## 2021-04-27 RX ADMIN — LEVOTHYROXINE SODIUM 100 MCG: 0.1 TABLET ORAL at 07:05

## 2021-04-27 RX ADMIN — MONTELUKAST 10 MG: 10 TABLET, FILM COATED ORAL at 08:33

## 2021-04-27 RX ADMIN — OXYCODONE 5 MG: 5 TABLET ORAL at 07:05

## 2021-04-27 RX ADMIN — FAMOTIDINE 20 MG: 20 TABLET ORAL at 08:33

## 2021-04-27 RX ADMIN — OXYCODONE 5 MG: 5 TABLET ORAL at 02:53

## 2021-04-27 RX ADMIN — POLYETHYLENE GLYCOL 3350 17 G: 17 POWDER, FOR SOLUTION ORAL at 08:34

## 2021-04-27 RX ADMIN — CEFAZOLIN SODIUM 2 G: 1 INJECTION, POWDER, FOR SOLUTION INTRAMUSCULAR; INTRAVENOUS at 00:14

## 2021-04-27 RX ADMIN — ACETAMINOPHEN 650 MG: 325 TABLET, FILM COATED ORAL at 08:33

## 2021-04-27 NOTE — NURSE NAVIGATOR
[facility]  SB[facility] Orthopaedic Pathway Handoff     FROM:                                TO: [facility]                                                      ([facility] or Facility name)  Lluvia Perez 57  [address] [place] [address]  Dept: [address] [facility] [address]: [phone]                                      Room#:  553/01                                                       Nurse Navigator:  Boston Deng RN         SITUATION      ASAScore: ASA 3 - Patient with moderate systemic disease with functional limitations    Admitted:  4/26/2021  Hospital Day: 2      Attending Provider:  Ann Xie MD     Consultations:  None    PCP:  Pamela Marie MD   [phone]     Admitting Dx:  Osteoarthritis of right knee [M17.11]       Active Problems:    Osteoarthritis of right knee (4/26/2021)      1 Day Post-Op of   Procedure(s):  RIGHT TOTAL KNEE ARTHROPLASTY   BY: Ann Xie MD             ON: 4/26/2021                  Code Status: Full Code             Advance Directive? Verified (Send w/patient)     Isolation:  There are currently no Active Isolations       MDRO: No current active infections    BACKGROUND     Allergies: Allergies   Allergen Reactions    Ceclor [Cefaclor] Hives       Past Medical History:   Diagnosis Date    Acid reflux     Acoustic neuroma ([address] 05/2012    GAMMA KNIFE DR Allison Stephens    Arthritis     Chronic obstructive pulmonary disease (HCC)     Chronic pain     left  leg    GERD (gastroesophageal reflux disease)     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  Hypothyroid     Other ill-defined conditions(799.89)     cavinous sinus thrombosis    Shingles 2014    Stroke ([address] 08/2012    CAVERNOUS SINUS THROMBOSIS; RIGHT HAND WEAKNESS.        Past Surgical History:   Procedure Laterality Date    HC SLNG OBTURATOR SYS TVT GYNECA  2008    HX CATARACT REMOVAL  2014  HX CERVICAL FUSION  2011    C5-7    HX CHOLECYSTECTOMY  2010    HX COLONOSCOPY  2016    HX GYN  2008    COLOPORRHAPHY    HX HEART CATHETERIZATION  2016    NEGATIVE PER PATIENT    HX HEENT Left 2017    BONE INDUCTION FOR HEARING AID    HX HIP REPLACEMENT Right 2018    DR SOLOMON    HX HYSTERECTOMY  1976    HX LUMBAR FUSION  2014    HX ORTHOPAEDIC  2006    TRIGGER FINGER    HX OTHER SURGICAL  2012    GAMMA KNIFE RADIATION    HX REFRACTIVE SURGERY Bilateral 2003    LASIK    HX TONSILLECTOMY  1948    MT COMBINED ANT/POST COLPORRHAPHY  2008    MT REMOVAL OF ANAL FISSURE  1965       Prior to Admission Medications   Prescriptions Last Dose Informant Patient Reported? Taking? MULTIVITAMIN PO 2021  Yes No   Sig: Take 1 Tab by mouth daily. albuterol (PROAIR HFA) 90 mcg/actuation inhaler 3/26/2021 at Unknown time  Yes Yes   Sig: Take 2 Puffs by inhalation every four (4) hours as needed for Wheezing. aspirin delayed-release 81 mg tablet 2021  Yes No   Sig: Take 81 mg by mouth daily. cholecalciferol (Vitamin D3) (1000 Units /25 mcg) tablet 2021  Yes No   Sig: Take 2,000 Units by mouth daily. diclofenac (VOLTAREN) 1 % gel 2021  Yes No   Sig: Apply  to affected area four (4) times daily. diclofenac EC (VOLTAREN) 50 mg EC tablet 2021  Yes No   Sig: Take  by mouth two (2) times a day. famotidine (PEPCID) 20 mg tablet 2021 at 0530  Yes Yes   Sig: Take 20 mg by mouth two (2) times a day. fluticasone propionate (FLONASE) 50 mcg/actuation nasal spray 2021  Yes No   Si Sprays by Both Nostrils route daily. fluticasone-umeclidinium-vilanterol (Trelegy Ellipta) 100-62.5-25 mcg inhaler 2021 at 0530  Yes Yes   Sig: Take 1 Puff by inhalation daily. levothyroxine (SYNTHROID) 100 mcg tablet 2021 at 0530  Yes Yes   Sig: Take 100 mcg by mouth Daily (before breakfast). lidocaine 3.5 % ptmd 2021  Yes No   Sig: by Apply Externally route as needed. montelukast (SINGULAIR) 10 mg tablet 4/26/2021 at 0530  Yes Yes   Sig: Take 10 mg by mouth daily. raNITIdine (ZANTAC) 150 mg tablet 4/25/2021 at Unknown time  Yes Yes   Sig: Take 150 mg by mouth two (2) times a day. senna-docusate (PERICOLACE) 8.6-50 mg per tablet Unknown at Unknown time  No No   Sig: Take 1 Tab by mouth daily. tolterodine ER (DETROL LA) 4 mg ER capsule 4/26/2021 at 0530  Yes Yes   Sig: Take 4 mg by mouth daily. trolamine salicylate (ASPERCREME EX) 4/12/2021  Yes No   Sig: by Apply Externally route as needed. Facility-Administered Medications: None       Vaccinations: There is no immunization history on file for this patient. ASSESSMENT   Age: 68 y.o. Gender: female        Height: Height: 5' 1\" (154.9 cm)                    Weight:Weight: 63 kg (138 lb 14 oz)     Patient Vitals for the past 8 hrs:   Temp Pulse Resp BP SpO2   04/27/21 0832 98.9 °F (37.2 °C) 90 17 (!) 118/54 92 %            Active Orders   There are no active orders of the following types: Diet. Orientation: Orientation Level: Oriented X4    Active Lines/Drains:  (Peg Tube / Osorio / CL or S/L?):no    Urinary Status: Voiding      Last BM: Last Bowel Movement Date: 04/26/21     Skin Integrity: Incision (comment)(R knee)             Mobility: Slightly limited   Weight Bearing Status: WBAT (Weight Bearing as Tolerated)      Gait Training  Assistive Device: Gait belt, Walker, rolling  Ambulation - Level of Assistance: Contact guard assistance  Distance (ft): 100 Feet (ft)  Stairs - Level of Assistance: Contact guard assistance  Number of Stairs Trained: 4  Rail Use: Right   Interventions: Visual/Demos, Safety awareness training     On Anticoagulation?  YES  Aspirin                                         Pain Medications given:  oxycodone                                   Lab Results   Component Value Date/Time    Glucose 103 (H) 04/27/2021 03:04 AM    Hemoglobin A1c 5.2 04/14/2021 11:53 AM    INR 1.0 04/14/2021 11:53 AM    INR 1.0 03/14/2018 09:45 AM    HGB 9.7 (L) 04/27/2021 03:04 AM    HGB 12.7 04/14/2021 11:53 AM    HGB 9.9 (L) 08/31/2018 04:06 AM    HGB 10.9 (L) 08/30/2018 04:10 AM       Readmission Risks:  Score:         RECOMMENDATION     See After Visit Summary (AVS) for:  · Discharge instructions  · After [address]   · Medication Reconciliation          [facility] Orthopaedic Nurse Navigator  VALERY Joy, RN-BC       Office  [phone]  Cell      [phone]  Fax      [phone]  [email or web address]             . [place]

## 2021-04-27 NOTE — PROGRESS NOTES
Problem: Mobility Impaired (Adult and Pediatric)  Goal: *Acute Goals and Plan of Care (Insert Text)  Description: FUNCTIONAL STATUS PRIOR TO ADMISSION: Patient was independent and active without use of DME.    HOME SUPPORT PRIOR TO ADMISSION: The patient lived with  but did not require assist.    Physical Therapy Goals  Initiated 4/26/2021    1. Patient will move from supine to sit and sit to supine , scoot up and down, and roll side to side in bed with modified independence within 4 days. 2. Patient will perform sit to stand with modified independence within 4 days. 3. Patient will ambulate with modified independence for 150 feet with the least restrictive device within 4 days. 4. Patient will ascend/descend 4 stairs with Cane and one handrail(s) with modified independence within 4 days. 5. Patient will perform home exercise program per protocol with independence within 4 days. 6. Patient will demonstrate AROM 0-90 degrees in operative joint within 4 days. Outcome: Progressing Towards Goal    PHYSICAL THERAPY TREATMENT  Patient: Zhou Perez (43 y.o. female)  Date: 4/27/2021  Diagnosis: Osteoarthritis of right knee [M17.11] <principal problem not specified>  Procedure(s) (LRB):  RIGHT TOTAL KNEE ARTHROPLASTY (Right) 1 Day Post-Op  Precautions: Fall, WBAT  Chart, physical therapy assessment, plan of care and goals were reviewed. ASSESSMENT  Patient continues with skilled PT services and is progressing towards goals. Pt received supine in bed and willing to work with therapy. Pt able to tolerate bed LE exercises with no increase of pain after VC/TC for form. Pt showed good bed mobility to EOB with LE hooking. Pt continued to tolerate gait training to gym with RW and CGA, VC for sequencing and pacing. Pt continued with stair training up to 4 steps with RHR and CGA . Pt continued with gait training with RW to return to room with RW and improved sequencing. Pt had slight increase of pain with activity, but decreased on return back to bed. Pt completed session supine in bed with call bell within reach, ice on to decrease pain, and all needs met at the time. Rn notified of session. Current Level of Function Impacting Discharge (mobility/balance): CGA for mobility    Other factors to consider for discharge: pain management         PLAN :  Patient continues to benefit from skilled intervention to address the above impairments. Continue treatment per established plan of care. to address goals. Recommendation for discharge: (in order for the patient to meet his/her long term goals)  Physical therapy at least 2 days/week in the home     This discharge recommendation:  Has not yet been discussed the attending provider and/or case management    IF patient discharges home will need the following DME: patient owns DME required for discharge       SUBJECTIVE:   Patient stated I am ready to go home.  Flora Nguyễn    OBJECTIVE DATA SUMMARY:   Critical Behavior:  Neurologic State: Alert  Orientation Level: Oriented X4  Cognition: Appropriate decision making, Appropriate for age attention/concentration, Appropriate safety awareness, Follows commands    Functional Mobility Training:  Bed Mobility:  Supine to Sit: Contact guard assistance  Sit to Supine: Contact guard assistance  Scooting: Contact guard assistance     Transfers:  Sit to Stand: Contact guard assistance  Stand to Sit: Contact guard assistance     Balance:  Sitting: Intact  Standing: Impaired  Standing - Static: Constant support;Good  Standing - Dynamic : Constant support;Good    Ambulation/Gait Training:  Distance (ft): 100 Feet (ft)  Assistive Device: Gait belt;Walker, rolling  Ambulation - Level of Assistance: Contact guard assistance  Gait Abnormalities: Antalgic;Decreased step clearance  Right Side Weight Bearing: As tolerated  Base of Support: Widened;Shift to left  Stance: Right decreased  Speed/Kelsey: Pace decreased (<100 feet/min)  Interventions: Visual/Demos; Safety awareness training           Stairs:  Number of Stairs Trained: 4  Stairs - Level of Assistance: Contact guard assistance   Rail Use: Both, and RHR    Therapeutic Exercises:     EXERCISE   Sets   Reps   Active Active Assist   Passive Self ROM   Comments   Ankle Pumps 1 10 [x]                                        []                                        []                                        []                                           Quad Sets 1 10 [x]                                        []                                        []                                        []                                           Hamstring Sets   []                                        []                                        []                                        []                                           Glute sets 1 10 [x]                                        []                                        []                                        []                                           Knee Extension Stretch     []                                          []                                          []                                          []                                           Heel Slides   []                                        []                                        []                                        []                                           Long Arc Quads   []                                        []                                        []                                        []                                           Knee Flexion Stretch   []                                        []                                        []                                        []                                           Straight Leg Raises   []                                        []                                        []                                        [] Pain Rating:  3/10 at rest 6/10 worst with acitivty  Activity Tolerance:   Good    After treatment patient left in no apparent distress:   Supine in bed and Call bell within reach    COMMUNICATION/COLLABORATION:   The patients plan of care was discussed with: Registered nurse.      Maureen Irby PTA   Time Calculation: 22 mins

## 2021-04-27 NOTE — PROGRESS NOTES
Bedside and Verbal shift change report given to Vidya Gonzales RN (oncoming nurse) by Alfreda Mendoza RN (offgoing nurse). Report included the following information SBAR, Kardex, OR Summary, Procedure Summary, Intake/Output, MAR, Accordion and Recent Results.

## 2021-04-27 NOTE — DISCHARGE INSTRUCTIONS
Discharge Instructions Knee Replacement  Dr. Leonardo Corona      Patient Name  Lorne Snow  Date of procedure  4/26/2021    Procedure  Procedure(s):  RIGHT TOTAL KNEE ARTHROPLASTY  Surgeon  Surgeon(s) and Role:     Caryl Benavides MD - Primary  Date of discharge: 4/ /2021  PCP: Ashli Cardona MD    Follow up care   Follow up visit with Dr. Leonardo Corona in 3 weeks. Call [phone]  to make an appointment   You do have staples in your knee incision. They will be taken out in 14 days by [facility] staff. Activity at home   Take a short walk every hour; except at night when sleeping   Do your Home Exercise Program 3 times every day    After exercising lie down and elevate your leg on pillows for 15-30 minutes to decrease swelling   Refer to your patient notebook for more information    Bathing and caring for your incision   You may take a shower with your waterproof dressing on your knee.  The waterproof dressing is to stay on your knee for 7 days.  On the 7th day have someone gently peel the dressing off by lifting the edge and stretching it to break the seal.   You may then leave your incision open to air unless you see drainage from your knee. Preventing blood clots   Take Aspirin 325 mg twice daily with foor for 3 weeks, then reduce dose to once daily for 3 weeks, then discontinue.  Call Dr. Leonardo Corona if you have side effects of blood thinning medication: bleeding, bruising, upset stomach, or diarrhea.  Call Dr. Leonardo Corona for signs of a blood clot in your leg: calf pain, tenderness, redness, swelling of lower leg    Preventing lung congestion   Use your incentive spirometer 4 times a day; do 10 repetitions each time   Remember to keep the small blue ball between the two arrows when taking a slow, deep breath     Pain Management   Get up and walk a short distance to relieve pain and stiffness.  Place ice wrap on your knee except when you are walking.  The gel ice packs should be changed about every 4 hours   Elevate your leg on pillows for 15-30 minutes    Take Tylenol 650mg (take two 325mg tablets) every 6 hours for pain.  If needed, take a narcotic pain pill every 4-6 hours as prescribed.  Take all medications with a small amount of food.  As your pain decreases, take the narcotics less often or take ½ of a pill   Call Dr. Oma Barksdale if you have side effects from your narcotic pain medication: itching, drowsiness, dizziness, upset stomach, dry mouth, constipation or if you medication is not relieving your pain. Diet after surgery   You may resume your normal diet. Include vegetables, fruit, whole grains, lean meats, and low-fat dairy products. Eat food high in fiber    Drink plenty of fluids, including 8 cups of water daily   Take Senokot-s twice a day to prevent constipation    Avoid after surgery   Do not take any over-the-counter medication for pain except Tylenol   Do not take more than 3000mg (3 grams) of Tylenol in 24 hours.  Do not drink alcoholic beverages   Do not smoke   Do not drive until seen for follow up appointment   Do not place frozen gel pack directly on your skin. It can cause frostbite.  Do not take a tub bath, swim or get in a hot tub for 6 weeks  Prevention of falls and safety at home   Set up an area where you can rest comfortably leaving space around furniture to allow you to walk with your walker   Keep stairs, hallways and bathrooms well lit; especially at night   Arrange for care for your pets   Keep your home free of clutter      Call Dr. Oma Barksdale at [phone] for:   Pain that is not relieved by pain medication, ice and activity   Side effects of medications   Increased/spread of bruising   Warning signs of infection:  ? persistent fever greater than 100 degrees  ?  shaking or chills  ? increased redness, tenderness, swelling or drainage from incision  ? increased pain during activity or rest   Warning signs of a blood clot in your leg:  ? increased pain in your calf  ? tenderness or redness  ? increased swelling or knee, calf, ankle or foot    Call [phone] after 5pm or on a weekend.  The on call physician will return your phone call   Call your Primary Care Doctor for:    Concerns about your medical conditions such as diabetes, high blood pressure, asthma, congestive heart failure   Blood sugars greater than 180   Persistent headache or dizziness   Coughing or congestion   Constipation or diarrhea   Burning when you go to the bathroom   Abnormal heart rate (fast or slow)     Call 911 and go to the nearest hospital for:    Sudden increased shortness of breath   Sudden onset of chest pain   Difficulty breathing   Localized chest pain with coughing or taking a deep breath

## 2021-04-27 NOTE — PROGRESS NOTES
ORTHO PROGRESS NOTE    2021  Admit Date:   2021        Subjective:    Jeffy Colon is a 68 y.o. WHITE  BLACK/ female who is 1 Day Post-Op Procedure(s):  RIGHT TOTAL KNEE ARTHROPLASTY. The patient states moderate pain, otherwise without c/o at this time. The patient denies CP, SOB, N/V. Vital Signs:    Patient Vitals for the past 8 hrs:   BP Temp Pulse Resp SpO2   21 0253 116/62 97.7 °F (36.5 °C) 85 16 93 %     Temp (24hrs), Av °F (36.7 °C), Min:97.5 °F (36.4 °C), Max:98.8 °F (37.1 °C)      Pain Control:   Pain Assessment  Pain Scale 1: Numeric (0 - 10)  Pain Intensity 1: 4  Pain Onset 1: post op  Pain Location 1: Knee  Pain Orientation 1: Right  Pain Description 1: Aching  Pain Intervention(s) 1: Medication (see MAR)    LAB:    Recent Labs     21  0304   HGB 9.7*      K 4.1      CO2 25   BUN 10   CREA 0.72   *       Assessment & Physician's Comment:  A&O X3, NAD  Respirations unlabored  Abdomen S/NT  DF/EHL/PF intact  Distal pulses intact  Calves S/NT, SILT bilateral lower extremities  Dressing is clean, dry, and intact    Procedure:  Procedure(s):  RIGHT TOTAL KNEE ARTHROPLASTY    Plan:  1) Pain Control: Adequate, continue current regimen. 2) Hemodynamics: Stable. Will continue to monitor. 3) Wound: Change dressing if saturated. 4) Activity: WBAT, mobilize with assist.  5) DVT Prophylaxis: ASA, SCD's, encourage ankle pump exercise, mobilize. 6) Disposition: Case management for discharge planning. Plan on home today/tomorrow depending upon progress mobilizing with PT. Will continue to follow progress closely.         Nanci Guerrero PA-C

## 2021-04-27 NOTE — PROGRESS NOTES
VANESSA: Home with  and HH. [facility] has accepted patient. Patient owns a rolling walker. Family to transport patient home via car. Care Management Interventions  PCP Verified by CM: Yes  Mode of Transport at Discharge: Other (see comment)(family/car)  Transition of Care Consult (CM Consult): Home Health, Discharge Planning  [place] [address] [address]: No  Reason Outside [place]: Out of service area, Patient already serviced by other home care/hospice agency  MyChart Signup: No  Discharge Durable Medical Equipment: No  Physical Therapy Consult: Yes  Occupational Therapy Consult: Yes  Speech Therapy Consult: No  Current Support Network: Lives with Spouse, Own Home  Confirm Follow Up Transport: Family  The Patient and/or Patient Representative was Provided with a Choice of Provider and Agrees with the Discharge Plan?: Yes  Freedom of Choice List was Provided with Basic Dialogue that Supports the Patient's Individualized Plan of Care/Goals, Treatment Preferences and Shares the Quality Data Associated with the Providers?: Yes  Discharge Location  Discharge Placement: Home with home health    Reason for Admission:  RIGHT TOTAL KNEE ARTHROPLASTY                      RUR Score:       N/A (outpatient status)              Plan for utilizing home health: The Plan for Transition of Care is related to the following treatment goals: home health    The Patient and/or patient representative  was provided with a choice of provider and agrees   with the discharge plan. [x] Yes [] No    Freedom of choice list was provided with basic dialogue that supports the patient's individualized plan of care/goals, treatment preferences and shares the quality data associated with the providers.  [x] Yes [] No      PCP: First and Last name:  Carmen Palmer MD     Name of Practice:    Are you a current patient: Yes/No: yes   Approximate date of last visit:    Can you participate in a virtual visit with your PCP:                     Current Advanced Directive/Advance Care Plan: Full Code      Healthcare Decision Maker:   Click here to complete [address] including selection of the Healthcare Decision Maker Relationship (ie \"Primary\")                             Transition of Care Plan:    Home with [facility]                    Observation notice provided in writing to patient and/or caregiver as well as verbal explanation of the policy. Patients who are in outpatient status also receive the Observation notice.             PRESTON Beth/CRM

## 2021-05-03 ENCOUNTER — PATIENT OUTREACH (OUTPATIENT)
Dept: CASE MANAGEMENT | Age: 81
End: 2021-05-03

## 2021-05-03 NOTE — PROGRESS NOTES
This note will not be viewable in [address] Post Discharge Follow-up contact after Joint Replacement    Patient discharged on 4/27/21  By  Santino Ratliff   following  right knee Arthroplasty. Spoke with patient today, who reports they are \" having a tough time - my knee is extremely swollen. \"  Denies Fever, Shortness of Breath or Chest Pain. Home Health has visited. Patient also reports:  Aquacel dressing is clean, dry, intact  Calf is non-tender, operative extremity has moderate swelling. Pain is well managed. Discussed use of ice & elevation. Patient is progressing with therapy and is exercising independently. Taking Aspirin for anticoagulation, Tylenol for pain. She is avoiding oxycodone due to stomach upset. Patient is not experiencing symptoms of constipation & urinating without difficulty. Discussed side effects of anticoagulants & pain medications (bleeding/bruising, constipation, lightheaded/dizziness)  Follow up appointment is scheduled. Discussed calling surgeon Dr Gertrudis Ames  for drainage, bleeding, swelling in operative extremity, fever or pain. Discussed calling PCP Dr Mendez Zimmer with other medical issues.

## 2022-01-17 ENCOUNTER — TRANSCRIBE ORDER (OUTPATIENT)
Dept: SCHEDULING | Age: 82
End: 2022-01-17

## 2022-01-17 DIAGNOSIS — Z12.31 SCREENING MAMMOGRAM FOR HIGH-RISK PATIENT: Primary | ICD-10-CM

## 2022-01-20 ENCOUNTER — HOSPITAL ENCOUNTER (OUTPATIENT)
Dept: MAMMOGRAPHY | Age: 82
Discharge: HOME | End: 2022-01-20
Attending: FAMILY MEDICINE
Payer: MEDICARE

## 2022-01-20 DIAGNOSIS — Z12.31 SCREENING MAMMOGRAM FOR HIGH-RISK PATIENT: ICD-10-CM

## 2022-01-20 PROCEDURE — 77063 BREAST TOMOSYNTHESIS BI: CPT

## 2022-03-18 PROBLEM — M17.11 OSTEOARTHRITIS OF RIGHT KNEE: Status: ACTIVE | Noted: 2021-04-26

## 2022-03-18 PROBLEM — M16.11 OSTEOARTHRITIS OF RIGHT HIP: Status: ACTIVE | Noted: 2018-04-09

## 2022-03-19 PROBLEM — J44.9 COPD (CHRONIC OBSTRUCTIVE PULMONARY DISEASE) (HCC): Status: ACTIVE | Noted: 2018-03-19

## 2022-03-19 PROBLEM — H91.93 BILATERAL HEARING LOSS: Status: ACTIVE | Noted: 2018-03-19

## 2022-03-19 PROBLEM — M48.061 SPINAL STENOSIS OF LUMBAR REGION: Status: ACTIVE | Noted: 2018-08-29

## 2022-03-20 PROBLEM — K21.9 GERD (GASTROESOPHAGEAL REFLUX DISEASE): Status: ACTIVE | Noted: 2018-03-19

## 2022-06-27 PROBLEM — E87.5 HYPERKALEMIA: Status: ACTIVE | Noted: 2022-06-27

## 2022-06-27 PROBLEM — E03.9 HYPOTHYROIDISM, UNSPECIFIED: Status: ACTIVE | Noted: 2022-06-27

## 2022-06-27 PROBLEM — I63.9 CEREBRAL INFARCT (HCC): Status: ACTIVE | Noted: 2022-06-27

## 2022-08-29 ENCOUNTER — OFFICE VISIT (OUTPATIENT)
Dept: GASTROENTEROLOGY | Age: 82
End: 2022-08-29
Payer: MEDICARE

## 2022-08-29 VITALS
SYSTOLIC BLOOD PRESSURE: 160 MMHG | DIASTOLIC BLOOD PRESSURE: 70 MMHG | BODY MASS INDEX: 27.68 KG/M2 | HEIGHT: 61 IN | TEMPERATURE: 97.8 F | OXYGEN SATURATION: 96 % | WEIGHT: 146.6 LBS | RESPIRATION RATE: 14 BRPM | HEART RATE: 81 BPM

## 2022-08-29 DIAGNOSIS — K52.9 CHRONIC DIARRHEA: Primary | ICD-10-CM

## 2022-08-29 DIAGNOSIS — K52.9 CHRONIC DIARRHEA: ICD-10-CM

## 2022-08-29 DIAGNOSIS — R19.8 CHANGE IN BOWEL MOVEMENT: ICD-10-CM

## 2022-08-29 DIAGNOSIS — J44.9 CHRONIC OBSTRUCTIVE PULMONARY DISEASE, UNSPECIFIED COPD TYPE (HCC): ICD-10-CM

## 2022-08-29 DIAGNOSIS — K62.5 RECTAL BLEEDING: ICD-10-CM

## 2022-08-29 DIAGNOSIS — K64.8 INTERNAL HEMORRHOIDS WITH COMPLICATION: ICD-10-CM

## 2022-08-29 PROCEDURE — G8510 SCR DEP NEG, NO PLAN REQD: HCPCS | Performed by: INTERNAL MEDICINE

## 2022-08-29 PROCEDURE — G8427 DOCREV CUR MEDS BY ELIG CLIN: HCPCS | Performed by: INTERNAL MEDICINE

## 2022-08-29 PROCEDURE — 1123F ACP DISCUSS/DSCN MKR DOCD: CPT | Performed by: INTERNAL MEDICINE

## 2022-08-29 PROCEDURE — G8536 NO DOC ELDER MAL SCRN: HCPCS | Performed by: INTERNAL MEDICINE

## 2022-08-29 PROCEDURE — G8417 CALC BMI ABV UP PARAM F/U: HCPCS | Performed by: INTERNAL MEDICINE

## 2022-08-29 PROCEDURE — G8400 PT W/DXA NO RESULTS DOC: HCPCS | Performed by: INTERNAL MEDICINE

## 2022-08-29 PROCEDURE — 1101F PT FALLS ASSESS-DOCD LE1/YR: CPT | Performed by: INTERNAL MEDICINE

## 2022-08-29 PROCEDURE — 99204 OFFICE O/P NEW MOD 45 MIN: CPT | Performed by: INTERNAL MEDICINE

## 2022-08-29 PROCEDURE — 1090F PRES/ABSN URINE INCON ASSESS: CPT | Performed by: INTERNAL MEDICINE

## 2022-08-29 RX ORDER — MONTELUKAST SODIUM 4 MG/1
1 TABLET, CHEWABLE ORAL 2 TIMES DAILY
Qty: 60 TABLET | Refills: 3 | Status: ON HOLD | OUTPATIENT
Start: 2022-08-29

## 2022-08-29 RX ORDER — OMEPRAZOLE 20 MG/1
20 TABLET, DELAYED RELEASE ORAL DAILY
Status: ON HOLD | COMMUNITY

## 2022-08-29 NOTE — PROGRESS NOTES
1. Have you been to the ER, urgent care clinic since your last visit? Hospitalized since your last visit? no    2. Have you seen or consulted any other health care providers outside of the [address] [place] [address] since your last visit? Include any pap smears or colon screening.   No   Chief Complaint   Patient presents with    New Patient     Diarrhea and Gas     Visit Vitals  BP (!) 160/70 (BP 1 Location: Right upper arm, BP Patient Position: Sitting, BP Cuff Size: Adult)   Pulse 81   Temp 97.8 °F (36.6 °C) (Temporal) Comment: room air   Resp 14   Ht 5' 1\" (1.549 m)   Wt 66.5 kg (146 lb 9.6 oz)   SpO2 96% Comment: room air   BMI 27.70 kg/m²

## 2022-08-29 NOTE — PROGRESS NOTES
Patient was given [facility] stool specimen container to take home and collect specimen. 8--Patient brought in stool specimen. It was sent out to [facility] by [employer].

## 2022-08-30 NOTE — PROGRESS NOTES
Gypsy Stewart is a 80 y.o. female who presents today for the following:  Chief Complaint   Patient presents with    New Patient     Diarrhea and Gas for 8 mos. Immodium nor prednisone has helped. Allergies   Allergen Reactions    Ceclor [Cefaclor] Hives    Golytely [Peg-Electrolyte Soln] Other (comments)       Current Outpatient Medications   Medication Sig    omeprazole (PriLOSEC OTC) 20 mg tablet Take 20 mg by mouth daily. colestipoL (COLESTID) 1 gram tablet Take 1 Tablet by mouth two (2) times a day. aspirin delayed-release 325 mg tablet Take 1 Tab by mouth two (2) times a day. Indications: post op DVT prevention    fluticasone propionate (FLONASE) 50 mcg/actuation nasal spray 2 Sprays by Both Nostrils route daily. fluticasone-umeclidinium-vilanterol (Trelegy Ellipta) 100-62.5-25 mcg inhaler Take 1 Puff by inhalation daily. cholecalciferol (VITAMIN D3) (1000 Units /25 mcg) tablet Take 2,000 Units by mouth daily. lidocaine 3.5 % ptmd by Apply Externally route as needed. levothyroxine (SYNTHROID) 100 mcg tablet Take 100 mcg by mouth Daily (before breakfast). montelukast (SINGULAIR) 10 mg tablet Take 10 mg by mouth daily. tolterodine ER (DETROL LA) 4 mg ER capsule Take 4 mg by mouth daily. MULTIVITAMIN PO Take 1 Tab by mouth daily. famotidine (PEPCID) 20 mg tablet Take 20 mg by mouth two (2) times a day. (Patient not taking: Reported on 8/29/2022)    senna-docusate (PERICOLACE) 8.6-50 mg per tablet Take 1 Tab by mouth daily. (Patient not taking: Reported on 8/29/2022)    raNITIdine (ZANTAC) 150 mg tablet Take 150 mg by mouth two (2) times a day. (Patient not taking: Reported on 8/29/2022)    albuterol (PROVENTIL HFA, VENTOLIN HFA, PROAIR HFA) 90 mcg/actuation inhaler Take 2 Puffs by inhalation every four (4) hours as needed for Wheezing. No current facility-administered medications for this visit.        Past Medical History:   Diagnosis Date    Abdominal pain, left lower quadrant     Acid reflux     Acoustic neuroma ([facility] 05/2012    GAMMA KNIFE DR Flaquito Foster    Arthritis     Cerebral infarct ([facility] 6/27/2022    Change in bowel movement     Chronic obstructive pulmonary disease (HCC)     Chronic pain     left  leg    Colon polyps     BENIGN    Contusion of left wrist 12-    Degeneration of intervertebral disc of lumbar region     Degenerative lumbar disc     Diarrhea     Diverticulitis     Diverticulosis     Dysarthria     GERD (gastroesophageal reflux disease)     GERD (gastroesophageal reflux disease)     H/O fall 12/14/2021    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 Hearing loss of left ear     Heartburn     Hemorrhage of rectum     Hemorrhoids     Hyperkalemia 6/27/2022    Hypothyroid     Hypothyroidism, unspecified 6/27/2022    Intracranial and intraspinal phlebitis and thrombophlebitis     Loose stools     Monoplegia (HCC)     Nausea & vomiting     Other ill-defined conditions(799.89)     cavinous sinus thrombosis    Rib pain 12/14/2021    Shingles 2014    Slurred speech     Stroke ([facility] 08/2012    CAVERNOUS SINUS THROMBOSIS; RIGHT HAND WEAKNESS.     Unspecified lack of coordination        Past Surgical History:   Procedure Laterality Date    COLONOSCOPY  2015    COLONOSCOPY  01/09/2003    FLEXIBLE SIGMOIDOSCOPY    COLONOSCOPY  04/12/2006    COLONOSCOPY  07/10/2009    COLONOSCOPY,DIAGNOSTIC      HC SLNG OBTURATOR SYS TVT GYNECA  2008    HX CATARACT REMOVAL  2014    HX CERVICAL FUSION  2011    C5-7    HX CHOLECYSTECTOMY  2010    HX COLONOSCOPY  2016    HX GYN  2008    COLOPORRHAPHY    HX HEART CATHETERIZATION  2016    NEGATIVE PER PATIENT    HX HEENT Left 2017    BONE INDUCTION FOR HEARING AID    HX HIP REPLACEMENT Right 04/2018    DR Bridget Juárez    HX HYSTERECTOMY  1976    HX HYSTERECTOMY  1976    HX LUMBAR FUSION  2014    HX ORTHOPAEDIC  2006    TRIGGER FINGER    HX ORTHOPAEDIC      R KNEE REPLACEMENT    HX ORTHOPAEDIC      HAND SURGERY    HX ORTHOPAEDIC      CERVICAL FUSION    HX OTHER SURGICAL  2012    GAMMA KNIFE RADIATION    HX OTHER SURGICAL      BLADDER SURGERY    HX OTHER SURGICAL  1965    ANAL FISTULA    HX REFRACTIVE SURGERY Bilateral 2003    LASIK    HX TONSILLECTOMY  1948    HX TONSILLECTOMY      LA COMBINED ANT/POST COLPORRHAPHY  2008    LA REMOVAL OF ANAL FISSURE  1965       Family History   Problem Relation Age of Onset    Other Mother         MYASTHENIA GRAVIS    Diabetes Mother [de-identified]    Cancer Brother         bladder    Clotting Disorder Father     No Known Problems Son     No Known Problems Son     No Known Problems Daughter     Heart Disease Other     Hypertension Other     Anesth Problems Neg Hx        Social History     Socioeconomic History    Marital status:      Spouse name: Not on file    Number of children: Not on file    Years of education: Not on file    Highest education level: Not on file   Occupational History    Not on file   Tobacco Use    Smoking status: Former     Packs/day: 1.00     Years: 30.00     Pack years: 30.00     Types: Cigarettes     Quit date: 1987     Years since quittin.5    Smokeless tobacco: Never   Vaping Use    Vaping Use: Never used   Substance and Sexual Activity    Alcohol use:  Yes     Alcohol/week: 5.8 standard drinks     Types: 7 Glasses of wine per week    Drug use: Not Currently    Sexual activity: Not on file   Other Topics Concern    Not on file   Social History Narrative    Not on file     Social Determinants of Health     Financial Resource Strain: Not on file   Food Insecurity: Not on file   Transportation Needs: Not on file   Physical Activity: Not on file   Stress: Not on file   Social Connections: Not on file   Intimate Partner Violence: Not on file   Housing Stability: Not on file         HPI  82-year-old female with history of gastroesophageal reflux disease, diverticula disease, internal hemorrhoids, status post CVA, COPD, osteoarthritis, and hearing loss who comes in for evaluation of diarrhea and increased gas. He states he has had loose stools for the last 9 proximately 8 months. No known weight loss. Good appetite. No abdominal pain but she has fullness or bloating. She states she has multiple serial bowel movements particularly in the a.m. She states every stool is loose. No one else in the family has a similar such problem. Stop use of caffeine as well as her vitamins without help. She took a dose pack of prednisone without improvement. She also was given a fixed dose of prednisone daily without help. She was given loperamide 2 mg without help present not on any medication for this problem. Patient states she does have COPD and does cough a lot. Aceteff she coughs it may cause her to have fecal incontinence. No gross GI bleeding. Patient has history of hemorrhoids and may see a little bright red blood on wiping at times. She states she had pelvic floor support surgery approximately 20 years ago. Patient last had a colonoscopy on 12/11/2015 which showed sigmoid diverticulosis, inflamed internal hemorrhoids, and angiodysplasias involving ascending colon. On a previous colonoscopy in 2006 colon polyps were removed. Review of Systems   Constitutional: Negative. HENT: Negative. Negative for nosebleeds. Eyes: Negative. Respiratory:  Positive for cough. Cardiovascular: Negative. Gastrointestinal:  Positive for blood in stool and diarrhea. Negative for abdominal pain, constipation, heartburn, melena, nausea and vomiting. Genitourinary: Negative. Musculoskeletal:  Positive for joint pain. Skin: Negative. Neurological: Negative. Endo/Heme/Allergies: Negative. Psychiatric/Behavioral: Negative. All other systems reviewed and are negative.       Visit Vitals  BP (!) 160/70 (BP 1 Location: Right upper arm, BP Patient Position: Sitting, BP Cuff Size: Adult)   Pulse 81   Temp 97.8 °F (36.6 °C) (Temporal) Comment: room air   Resp 14   Ht 5' 1\" (1.549 m)   Wt 66.5 kg (146 lb 9.6 oz)   SpO2 96% Comment: room air   BMI 27.70 kg/m²     Physical Exam  Vitals and nursing note reviewed. Constitutional:       Appearance: Normal appearance. HENT:      Head: Normocephalic and atraumatic. Nose: Nose normal.      Mouth/Throat:      Mouth: Mucous membranes are moist.      Pharynx: Oropharynx is clear. Eyes:      General: No scleral icterus. Conjunctiva/sclera: Conjunctivae normal.      Pupils: Pupils are equal, round, and reactive to light. Cardiovascular:      Rate and Rhythm: Normal rate and regular rhythm. Pulses: Normal pulses. Heart sounds: Normal heart sounds. Pulmonary:      Effort: Pulmonary effort is normal.      Breath sounds: Normal breath sounds. Abdominal:      General: Bowel sounds are normal. There is no distension. Palpations: Abdomen is soft. There is no mass. Tenderness: There is no abdominal tenderness. There is no right CVA tenderness, left CVA tenderness, guarding or rebound. Hernia: No hernia is present. Musculoskeletal:         General: Normal range of motion. Cervical back: Normal range of motion and neck supple. Skin:     General: Skin is warm and dry. Coloration: Skin is not jaundiced. Neurological:      General: No focal deficit present. Mental Status: She is alert and oriented to person, place, and time. Psychiatric:         Mood and Affect: Mood normal.         Behavior: Behavior normal.         Thought Content: Thought content normal.         Judgment: Judgment normal.          1. Chronic diarrhea  Will checks stool for infectious cause of his diarrhea. Gave patient a trial of colestipol to hopefully bind possible toxins and decrease diarrhea. Patient was instructed if the above he does not improve symptoms or no infectious causes found, she may need to have a repeat colonoscopy.   Was also instructed after the stool sample she may consider beginning a probiotic.  - GASTROINTESTINAL PROFILE, STOOL, PCR; Future  - colestipoL (COLESTID) 1 gram tablet; Take 1 Tablet by mouth two (2) times a day. Dispense: 60 Tablet; Refill: 3    2. Change in bowel movement      3. Chronic obstructive pulmonary disease, unspecified COPD type ([facility]      4. Rectal bleeding  Probably secondary to the chronic bleeding and local such as hemorrhoids    5.  Internal hemorrhoids with complication

## 2022-09-13 ENCOUNTER — TELEPHONE (OUTPATIENT)
Dept: GASTROENTEROLOGY | Age: 82
End: 2022-09-13

## 2022-10-13 ENCOUNTER — OFFICE VISIT (OUTPATIENT)
Dept: GASTROENTEROLOGY | Age: 82
End: 2022-10-13
Payer: MEDICARE

## 2022-10-13 VITALS
DIASTOLIC BLOOD PRESSURE: 60 MMHG | RESPIRATION RATE: 14 BRPM | OXYGEN SATURATION: 95 % | HEART RATE: 69 BPM | BODY MASS INDEX: 27.75 KG/M2 | WEIGHT: 147 LBS | TEMPERATURE: 97.4 F | SYSTOLIC BLOOD PRESSURE: 160 MMHG | HEIGHT: 61 IN

## 2022-10-13 DIAGNOSIS — R19.8 CHANGE IN BOWEL MOVEMENT: ICD-10-CM

## 2022-10-13 DIAGNOSIS — K52.9 CHRONIC DIARRHEA: ICD-10-CM

## 2022-10-13 DIAGNOSIS — A04.0 ENTERITIS, ENTEROPATHOGENIC E. COLI: Primary | ICD-10-CM

## 2022-10-13 DIAGNOSIS — K21.00 GASTROESOPHAGEAL REFLUX DISEASE WITH ESOPHAGITIS WITHOUT HEMORRHAGE: ICD-10-CM

## 2022-10-13 PROCEDURE — 1123F ACP DISCUSS/DSCN MKR DOCD: CPT | Performed by: INTERNAL MEDICINE

## 2022-10-13 PROCEDURE — G8400 PT W/DXA NO RESULTS DOC: HCPCS | Performed by: INTERNAL MEDICINE

## 2022-10-13 PROCEDURE — 99214 OFFICE O/P EST MOD 30 MIN: CPT | Performed by: INTERNAL MEDICINE

## 2022-10-13 PROCEDURE — G8510 SCR DEP NEG, NO PLAN REQD: HCPCS | Performed by: INTERNAL MEDICINE

## 2022-10-13 PROCEDURE — 1101F PT FALLS ASSESS-DOCD LE1/YR: CPT | Performed by: INTERNAL MEDICINE

## 2022-10-13 PROCEDURE — G8417 CALC BMI ABV UP PARAM F/U: HCPCS | Performed by: INTERNAL MEDICINE

## 2022-10-13 PROCEDURE — 1090F PRES/ABSN URINE INCON ASSESS: CPT | Performed by: INTERNAL MEDICINE

## 2022-10-13 PROCEDURE — G8536 NO DOC ELDER MAL SCRN: HCPCS | Performed by: INTERNAL MEDICINE

## 2022-10-13 PROCEDURE — G8427 DOCREV CUR MEDS BY ELIG CLIN: HCPCS | Performed by: INTERNAL MEDICINE

## 2022-10-13 RX ORDER — ESCITALOPRAM OXALATE 10 MG/1
TABLET ORAL
Status: ON HOLD | COMMUNITY
Start: 2022-10-01

## 2022-10-13 RX ORDER — AZITHROMYCIN 500 MG/1
500 TABLET, FILM COATED ORAL DAILY
Qty: 3 TABLET | Refills: 0 | Status: ON HOLD | OUTPATIENT
Start: 2022-10-13

## 2022-10-13 RX ORDER — GUAIFENESIN 1200 MG/1
1200 TABLET, EXTENDED RELEASE ORAL 2 TIMES DAILY
Status: ON HOLD | COMMUNITY

## 2022-10-13 NOTE — PROGRESS NOTES
1. Have you been to the ER, urgent care clinic since your last visit? Hospitalized since your last visit? no    2. Have you seen or consulted any other health care providers outside of the [address] [place] [address] since your last visit? Include any pap smears or colon screening.  No   Chief Complaint   Patient presents with    Follow-up     1 month follow up    Diarrhea     Visit Vitals  BP (!) 160/60 (BP 1 Location: Right upper arm, BP Patient Position: Sitting, BP Cuff Size: Adult)   Pulse 69   Temp 97.4 °F (36.3 °C) (Temporal)   Resp 14   Ht 5' 1\" (1.549 m)   Wt 66.7 kg (147 lb)   SpO2 95% Comment: room air   BMI 27.78 kg/m²

## 2022-10-16 NOTE — PROGRESS NOTES
Tal Kelley is a [de-identified] y.o. female who presents today for the following:  Chief Complaint   Patient presents with    Follow-up     1 month follow up    Diarrhea     Still the same         Allergies   Allergen Reactions    Ceclor [Cefaclor] Hives    Golytely [Peg-Electrolyte Soln] Other (comments)       Current Outpatient Medications   Medication Sig    escitalopram oxalate (LEXAPRO) 10 mg tablet     guaiFENesin (Mucinex) 1,200 mg Ta12 ER tablet Take 1,200 mg by mouth two (2) times a day. azithromycin (ZITHROMAX) 500 mg tab Take 1 Tablet by mouth daily. omeprazole (PRILOSEC OTC) 20 mg tablet Take 20 mg by mouth daily. colestipoL (COLESTID) 1 gram tablet Take 1 Tablet by mouth two (2) times a day. (Patient taking differently: Take 1 g by mouth two (2) times a day. Not helping)    aspirin delayed-release 325 mg tablet Take 1 Tab by mouth two (2) times a day. Indications: post op DVT prevention    fluticasone propionate (FLONASE) 50 mcg/actuation nasal spray 2 Sprays by Both Nostrils route daily. fluticasone-umeclidinium-vilanterol (Trelegy Ellipta) 100-62.5-25 mcg inhaler Take 1 Puff by inhalation daily. cholecalciferol (VITAMIN D3) (1000 Units /25 mcg) tablet Take 2,000 Units by mouth daily. famotidine (PEPCID) 20 mg tablet Take 20 mg by mouth two (2) times a day. levothyroxine (SYNTHROID) 100 mcg tablet Take 100 mcg by mouth Daily (before breakfast). montelukast (SINGULAIR) 10 mg tablet Take 10 mg by mouth daily. tolterodine ER (DETROL LA) 4 mg ER capsule Take 4 mg by mouth daily. MULTIVITAMIN PO Take 1 Tab by mouth daily. lidocaine 3.5 % ptmd by Apply Externally route as needed. senna-docusate (PERICOLACE) 8.6-50 mg per tablet Take 1 Tab by mouth daily. (Patient not taking: No sig reported)    raNITIdine (ZANTAC) 150 mg tablet Take 150 mg by mouth two (2) times a day.  (Patient not taking: No sig reported)    albuterol (PROVENTIL HFA, VENTOLIN HFA, PROAIR HFA) 90 mcg/actuation inhaler Take 2 Puffs by inhalation every four (4) hours as needed for Wheezing. No current facility-administered medications for this visit. Past Medical History:   Diagnosis Date    Abdominal pain, left lower quadrant     Acid reflux     Acoustic neuroma ([address] 05/2012    GAMMA KNIFE DR Avilez Needs    Arthritis     Cerebral infarct ([facility] 6/27/2022    Change in bowel movement     Chronic obstructive pulmonary disease (HCC)     Chronic pain     left  leg    Colon polyps     BENIGN    Contusion of left wrist 12-    Degeneration of intervertebral disc of lumbar region     Degenerative lumbar disc     Diarrhea     Diverticulitis     Diverticulosis     Dysarthria     GERD (gastroesophageal reflux disease)     GERD (gastroesophageal reflux disease)     H/O fall 12/14/2021    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 Hearing loss of left ear     Heartburn     Hemorrhage of rectum     Hemorrhoids     Hyperkalemia 6/27/2022    Hypothyroid     Hypothyroidism, unspecified 6/27/2022    Intracranial and intraspinal phlebitis and thrombophlebitis     Loose stools     Monoplegia (HCC)     Nausea & vomiting     Other ill-defined conditions(799.89)     cavinous sinus thrombosis    Rib pain 12/14/2021    Shingles 2014    Slurred speech     Stroke ([facility] [address] 08/2012    CAVERNOUS SINUS THROMBOSIS; RIGHT HAND WEAKNESS.     Unspecified lack of coordination        Past Surgical History:   Procedure Laterality Date    COLONOSCOPY  2015    COLONOSCOPY  01/09/2003    FLEXIBLE SIGMOIDOSCOPY    COLONOSCOPY  04/12/2006    COLONOSCOPY  07/10/2009    COLONOSCOPY,DIAGNOSTIC      HC SLNG OBTURATOR SYS TVT GYNECA  2008    HX CATARACT REMOVAL  2014    HX CERVICAL FUSION  2011    C5-7    HX CHOLECYSTECTOMY  2010    HX COLONOSCOPY  2016    HX GYN  2008    COLOPORRHAPHY    HX HEART CATHETERIZATION  2016    NEGATIVE PER PATIENT    HX HEENT Left 2017    BONE INDUCTION FOR HEARING AID    HX HIP REPLACEMENT Right 04/2018 DR SOLOMON    HX HYSTERECTOMY  1976    HX HYSTERECTOMY  1976    HX LUMBAR FUSION  2014    HX ORTHOPAEDIC  2006    TRIGGER FINGER    HX ORTHOPAEDIC      R KNEE REPLACEMENT    HX ORTHOPAEDIC      HAND SURGERY    HX ORTHOPAEDIC      CERVICAL FUSION    HX OTHER SURGICAL  2012    GAMMA KNIFE RADIATION    HX OTHER SURGICAL      BLADDER SURGERY    HX OTHER SURGICAL  1965    ANAL FISTULA    HX REFRACTIVE SURGERY Bilateral 2003    LASIK    HX TONSILLECTOMY  1948    HX TONSILLECTOMY      OR COMBINED ANT/POST COLPORRHAPHY  2008    OR REMOVAL OF ANAL FISSURE  1965       Family History   Problem Relation Age of Onset    Other Mother         MYASTHENIA GRAVIS    Diabetes Mother [de-identified]    Cancer Brother         bladder    Clotting Disorder Father     No Known Problems Son     No Known Problems Son     No Known Problems Daughter     Heart Disease Other     Hypertension Other     Anesth Problems Neg Hx        Social History     Socioeconomic History    Marital status:      Spouse name: Not on file    Number of children: Not on file    Years of education: Not on file    Highest education level: Not on file   Occupational History    Not on file   Tobacco Use    Smoking status: Former     Packs/day: 1.00     Years: 30.00     Pack years: 30.00     Types: Cigarettes     Quit date: 1987     Years since quittin.7    Smokeless tobacco: Never   Vaping Use    Vaping Use: Never used   Substance and Sexual Activity    Alcohol use:  Yes     Alcohol/week: 5.8 standard drinks     Types: 7 Glasses of wine per week    Drug use: Not Currently    Sexual activity: Not on file   Other Topics Concern    Not on file   Social History Narrative    Not on file     Social Determinants of Health     Financial Resource Strain: Not on file   Food Insecurity: Not on file   Transportation Needs: Not on file   Physical Activity: Not on file   Stress: Not on file   Social Connections: Not on file   Intimate Partner Violence: Not on file   Housing Stability: Not on file         HPI  21-year-old female with history of gastroesophageal reflux disease, diverticular disease, hypothyroidism, status post CVA, COPD, and osteoarthritis who comes in for follow-up visit for chronic diarrhea. States she continues to have a lot of diarrhea. She states that the colestipol did not help much. Still no gross GI bleeding. She did have stool sample that was sent and came back positive for enteropathogenic E. coli. Gigi Mulligan He has not been treated for that as of yet. Review of Systems   Constitutional: Negative. HENT: Negative. Negative for nosebleeds. Eyes: Negative. Respiratory: Negative. Cardiovascular: Negative. Gastrointestinal:  Positive for abdominal pain and diarrhea. Negative for blood in stool, constipation, heartburn, melena, nausea and vomiting. Genitourinary: Negative. Musculoskeletal:  Positive for joint pain. Skin: Negative. Neurological: Negative. Endo/Heme/Allergies: Negative. Psychiatric/Behavioral: Negative. All other systems reviewed and are negative. Visit Vitals  BP (!) 160/60 (BP 1 Location: Right upper arm, BP Patient Position: Sitting, BP Cuff Size: Adult)   Pulse 69   Temp 97.4 °F (36.3 °C) (Temporal)   Resp 14   Ht 5' 1\" (1.549 m)   Wt 66.7 kg (147 lb)   SpO2 95% Comment: room air   BMI 27.78 kg/m²     Physical Exam  Vitals and nursing note reviewed. Constitutional:       Appearance: Normal appearance. HENT:      Head: Normocephalic and atraumatic. Nose: Nose normal.      Mouth/Throat:      Mouth: Mucous membranes are moist.      Pharynx: Oropharynx is clear. Eyes:      General: No scleral icterus. Conjunctiva/sclera: Conjunctivae normal.      Pupils: Pupils are equal, round, and reactive to light. Cardiovascular:      Rate and Rhythm: Normal rate and regular rhythm. Pulses: Normal pulses. Heart sounds: Normal heart sounds.    Pulmonary:      Effort: Pulmonary effort is normal. Breath sounds: Normal breath sounds. Abdominal:      General: Bowel sounds are normal. There is no distension. Palpations: Abdomen is soft. There is no mass. Tenderness: There is no abdominal tenderness. There is no right CVA tenderness, left CVA tenderness, guarding or rebound. Hernia: No hernia is present. Musculoskeletal:         General: Normal range of motion. Cervical back: Normal range of motion and neck supple. Skin:     General: Skin is warm and dry. Coloration: Skin is not jaundiced. Neurological:      General: No focal deficit present. Mental Status: She is alert and oriented to person, place, and time. Psychiatric:         Mood and Affect: Mood normal.         Behavior: Behavior normal.         Thought Content: Thought content normal.         Judgment: Judgment normal.          1. Enteritis, enteropathogenic E. coli  We will treat the infection will very 3-day course of azithromycin as a standard treatment for that infection. - azithromycin (ZITHROMAX) 500 mg tab; Take 1 Tablet by mouth daily. Dispense: 3 Tablet; Refill: 0    2. Chronic diarrhea  Problems secondary to the above infection. After treatment we will see if the infection persists. If you have diarrhea persist then will consider obtaining a second specimen. 3. Change in bowel movement      4.  Gastroesophageal reflux disease with esophagitis without hemorrhage

## 2022-11-02 ENCOUNTER — HOSPITAL ENCOUNTER (OUTPATIENT)
Dept: CT IMAGING | Age: 82
Discharge: HOME | End: 2022-11-02
Attending: FAMILY MEDICINE
Payer: MEDICARE

## 2022-11-02 ENCOUNTER — HOSPITAL ENCOUNTER (OUTPATIENT)
Dept: LAB | Age: 82
Discharge: HOME | End: 2022-11-02
Attending: FAMILY MEDICINE
Payer: MEDICARE

## 2022-11-02 ENCOUNTER — TRANSCRIBE ORDER (OUTPATIENT)
Dept: SCHEDULING | Age: 82
End: 2022-11-02

## 2022-11-02 ENCOUNTER — HOSPITAL ENCOUNTER (INPATIENT)
Age: 82
LOS: 7 days | Discharge: HOME | DRG: 329 | End: 2022-11-10
Attending: EMERGENCY MEDICINE | Admitting: INTERNAL MEDICINE
Payer: MEDICARE

## 2022-11-02 ENCOUNTER — TRANSCRIBE ORDER (OUTPATIENT)
Dept: REGISTRATION | Age: 82
End: 2022-11-02

## 2022-11-02 ENCOUNTER — APPOINTMENT (OUTPATIENT)
Dept: GENERAL RADIOLOGY | Age: 82
DRG: 329 | End: 2022-11-02
Attending: EMERGENCY MEDICINE
Payer: MEDICARE

## 2022-11-02 DIAGNOSIS — R14.0 DISTENDED ABDOMEN: ICD-10-CM

## 2022-11-02 DIAGNOSIS — R10.9 STOMACH ACHE: ICD-10-CM

## 2022-11-02 DIAGNOSIS — K56.2 CECAL VOLVULUS (HCC): Primary | ICD-10-CM

## 2022-11-02 DIAGNOSIS — R10.9 ABDOMINAL PAIN: Primary | ICD-10-CM

## 2022-11-02 DIAGNOSIS — R10.9 ABDOMINAL PAIN: ICD-10-CM

## 2022-11-02 DIAGNOSIS — R10.9 STOMACH ACHE: Primary | ICD-10-CM

## 2022-11-02 LAB
ALBUMIN SERPL-MCNC: 3.9 G/DL (ref 3.5–5)
ALBUMIN/GLOB SERPL: 1.2 {RATIO} (ref 1.1–2.2)
ALP SERPL-CCNC: 93 U/L (ref 45–117)
ALT SERPL-CCNC: 32 U/L (ref 12–78)
ANION GAP SERPL CALC-SCNC: 7 MMOL/L (ref 5–15)
AST SERPL W P-5'-P-CCNC: 27 U/L (ref 15–37)
BASOPHILS # BLD: 0 K/UL (ref 0–0.1)
BASOPHILS NFR BLD: 0 % (ref 0–1)
BILIRUB SERPL-MCNC: 0.7 MG/DL (ref 0.2–1)
BUN SERPL-MCNC: 19 MG/DL (ref 6–20)
BUN/CREAT SERPL: 23 (ref 12–20)
CA-I BLD-MCNC: 9.7 MG/DL (ref 8.5–10.1)
CHLORIDE SERPL-SCNC: 100 MMOL/L (ref 97–108)
CO2 SERPL-SCNC: 29 MMOL/L (ref 21–32)
CREAT SERPL-MCNC: 0.81 MG/DL (ref 0.55–1.02)
DIFFERENTIAL METHOD BLD: ABNORMAL
EOSINOPHIL # BLD: 0.3 K/UL (ref 0–0.4)
EOSINOPHIL NFR BLD: 2 % (ref 0–7)
ERYTHROCYTE [DISTWIDTH] IN BLOOD BY AUTOMATED COUNT: 12.4 % (ref 11.5–14.5)
GLOBULIN SER CALC-MCNC: 3.3 G/DL (ref 2–4)
GLUCOSE SERPL-MCNC: 124 MG/DL (ref 65–100)
HCT VFR BLD AUTO: 43.5 % (ref 35–47)
HGB BLD-MCNC: 14.3 G/DL (ref 11.5–16)
IMM GRANULOCYTES # BLD AUTO: 0.1 K/UL (ref 0–0.04)
IMM GRANULOCYTES NFR BLD AUTO: 0 % (ref 0–0.5)
LYMPHOCYTES # BLD: 2 K/UL (ref 0.8–3.5)
LYMPHOCYTES NFR BLD: 12 % (ref 12–49)
MCH RBC QN AUTO: 31.6 PG (ref 26–34)
MCHC RBC AUTO-ENTMCNC: 32.9 G/DL (ref 30–36.5)
MCV RBC AUTO: 96.2 FL (ref 80–99)
MONOCYTES # BLD: 1.1 K/UL (ref 0–1)
MONOCYTES NFR BLD: 7 % (ref 5–13)
NEUTS SEG # BLD: 12.5 K/UL (ref 1.8–8)
NEUTS SEG NFR BLD: 79 % (ref 32–75)
NRBC # BLD: 0 K/UL (ref 0–0.01)
NRBC BLD-RTO: 0 PER 100 WBC
PLATELET # BLD AUTO: 354 K/UL (ref 150–400)
PMV BLD AUTO: 10.2 FL (ref 8.9–12.9)
POTASSIUM SERPL-SCNC: 4 MMOL/L (ref 3.5–5.1)
PROT SERPL-MCNC: 7.2 G/DL (ref 6.4–8.2)
RBC # BLD AUTO: 4.52 M/UL (ref 3.8–5.2)
SODIUM SERPL-SCNC: 136 MMOL/L (ref 136–145)
WBC # BLD AUTO: 15.9 K/UL (ref 3.6–11)

## 2022-11-02 PROCEDURE — 74176 CT ABD & PELVIS W/O CONTRAST: CPT

## 2022-11-02 PROCEDURE — 71045 X-RAY EXAM CHEST 1 VIEW: CPT

## 2022-11-02 PROCEDURE — 99285 EMERGENCY DEPT VISIT HI MDM: CPT

## 2022-11-02 PROCEDURE — 36415 COLL VENOUS BLD VENIPUNCTURE: CPT

## 2022-11-02 PROCEDURE — 80053 COMPREHEN METABOLIC PANEL: CPT

## 2022-11-02 PROCEDURE — 85025 COMPLETE CBC W/AUTO DIFF WBC: CPT

## 2022-11-02 PROCEDURE — 74011000636 HC RX REV CODE- 636: Performed by: FAMILY MEDICINE

## 2022-11-02 RX ADMIN — DIATRIZOATE MEGLUMINE AND DIATRIZOATE SODIUM 30 ML: 660; 100 LIQUID ORAL; RECTAL at 18:03

## 2022-11-03 ENCOUNTER — TRANSCRIBE ORDER (OUTPATIENT)
Dept: SCHEDULING | Age: 82
End: 2022-11-03

## 2022-11-03 PROBLEM — K56.2: Status: ACTIVE | Noted: 2022-11-03

## 2022-11-03 LAB
GLUCOSE BLD STRIP.AUTO-MCNC: 105 MG/DL (ref 65–100)
LACTATE SERPL-SCNC: 1 MMOL/L (ref 0.4–2)
LACTATE SERPL-SCNC: 1.1 MMOL/L (ref 0.4–2)
PERFORMED BY, TECHID: ABNORMAL

## 2022-11-03 PROCEDURE — 65270000029 HC RM PRIVATE

## 2022-11-03 PROCEDURE — 99222 1ST HOSP IP/OBS MODERATE 55: CPT | Performed by: SURGERY

## 2022-11-03 PROCEDURE — 94640 AIRWAY INHALATION TREATMENT: CPT

## 2022-11-03 PROCEDURE — 36569 INSJ PICC 5 YR+ W/O IMAGING: CPT

## 2022-11-03 PROCEDURE — 74011250636 HC RX REV CODE- 250/636: Performed by: INTERNAL MEDICINE

## 2022-11-03 PROCEDURE — 74011250636 HC RX REV CODE- 250/636: Performed by: SURGERY

## 2022-11-03 PROCEDURE — 36415 COLL VENOUS BLD VENIPUNCTURE: CPT

## 2022-11-03 PROCEDURE — 74011250637 HC RX REV CODE- 250/637: Performed by: INTERNAL MEDICINE

## 2022-11-03 PROCEDURE — 94762 N-INVAS EAR/PLS OXIMTRY CONT: CPT

## 2022-11-03 PROCEDURE — 82962 GLUCOSE BLOOD TEST: CPT

## 2022-11-03 PROCEDURE — 02HV33Z INSERTION OF INFUSION DEVICE INTO SUPERIOR VENA CAVA, PERCUTANEOUS APPROACH: ICD-10-PCS

## 2022-11-03 PROCEDURE — 74011000250 HC RX REV CODE- 250: Performed by: INTERNAL MEDICINE

## 2022-11-03 PROCEDURE — 83605 ASSAY OF LACTIC ACID: CPT

## 2022-11-03 RX ORDER — SODIUM CHLORIDE 0.9 % (FLUSH) 0.9 %
5-40 SYRINGE (ML) INJECTION AS NEEDED
Status: DISCONTINUED | OUTPATIENT
Start: 2022-11-03 | End: 2022-11-05

## 2022-11-03 RX ORDER — ENOXAPARIN SODIUM 100 MG/ML
40 INJECTION SUBCUTANEOUS DAILY
Status: DISCONTINUED | OUTPATIENT
Start: 2022-11-03 | End: 2022-11-05

## 2022-11-03 RX ORDER — HYDROMORPHONE HYDROCHLORIDE 1 MG/ML
1 INJECTION, SOLUTION INTRAMUSCULAR; INTRAVENOUS; SUBCUTANEOUS
Status: DISCONTINUED | OUTPATIENT
Start: 2022-11-03 | End: 2022-11-05

## 2022-11-03 RX ORDER — INSULIN LISPRO 100 [IU]/ML
INJECTION, SOLUTION INTRAVENOUS; SUBCUTANEOUS EVERY 6 HOURS
Status: DISCONTINUED | OUTPATIENT
Start: 2022-11-03 | End: 2022-11-08

## 2022-11-03 RX ORDER — ALBUTEROL SULFATE 90 UG/1
2 AEROSOL, METERED RESPIRATORY (INHALATION)
Status: DISCONTINUED | OUTPATIENT
Start: 2022-11-03 | End: 2022-11-10 | Stop reason: HOSPADM

## 2022-11-03 RX ORDER — INSULIN LISPRO 100 [IU]/ML
INJECTION, SOLUTION INTRAVENOUS; SUBCUTANEOUS
Status: DISCONTINUED | OUTPATIENT
Start: 2022-11-03 | End: 2022-11-03

## 2022-11-03 RX ORDER — MORPHINE SULFATE 2 MG/ML
2 INJECTION, SOLUTION INTRAMUSCULAR; INTRAVENOUS
Status: DISCONTINUED | OUTPATIENT
Start: 2022-11-03 | End: 2022-11-03

## 2022-11-03 RX ORDER — ONDANSETRON 2 MG/ML
4 INJECTION INTRAMUSCULAR; INTRAVENOUS
Status: DISCONTINUED | OUTPATIENT
Start: 2022-11-03 | End: 2022-11-05

## 2022-11-03 RX ORDER — LABETALOL HCL 20 MG/4 ML
10 SYRINGE (ML) INTRAVENOUS
Status: COMPLETED | OUTPATIENT
Start: 2022-11-03 | End: 2022-11-08

## 2022-11-03 RX ORDER — ACETAMINOPHEN 650 MG/1
650 SUPPOSITORY RECTAL
Status: DISCONTINUED | OUTPATIENT
Start: 2022-11-03 | End: 2022-11-10 | Stop reason: HOSPADM

## 2022-11-03 RX ORDER — ACETAMINOPHEN 325 MG/1
650 TABLET ORAL
Status: DISCONTINUED | OUTPATIENT
Start: 2022-11-03 | End: 2022-11-10 | Stop reason: HOSPADM

## 2022-11-03 RX ORDER — SODIUM CHLORIDE 0.9 % (FLUSH) 0.9 %
5-40 SYRINGE (ML) INJECTION EVERY 8 HOURS
Status: DISCONTINUED | OUTPATIENT
Start: 2022-11-03 | End: 2022-11-05

## 2022-11-03 RX ORDER — MAGNESIUM SULFATE 100 %
4 CRYSTALS MISCELLANEOUS AS NEEDED
Status: DISCONTINUED | OUTPATIENT
Start: 2022-11-03 | End: 2022-11-10 | Stop reason: HOSPADM

## 2022-11-03 RX ORDER — ONDANSETRON 4 MG/1
4 TABLET, ORALLY DISINTEGRATING ORAL
Status: DISCONTINUED | OUTPATIENT
Start: 2022-11-03 | End: 2022-11-05

## 2022-11-03 RX ORDER — BUDESONIDE AND FORMOTEROL FUMARATE DIHYDRATE 160; 4.5 UG/1; UG/1
1 AEROSOL RESPIRATORY (INHALATION)
Status: DISCONTINUED | OUTPATIENT
Start: 2022-11-03 | End: 2022-11-05

## 2022-11-03 RX ORDER — DEXTROSE MONOHYDRATE 100 MG/ML
0-250 INJECTION, SOLUTION INTRAVENOUS AS NEEDED
Status: DISCONTINUED | OUTPATIENT
Start: 2022-11-03 | End: 2022-11-10 | Stop reason: HOSPADM

## 2022-11-03 RX ADMIN — TIOTROPIUM BROMIDE INHALATION SPRAY 2 PUFF: 3.12 SPRAY, METERED RESPIRATORY (INHALATION) at 08:37

## 2022-11-03 RX ADMIN — HYDROMORPHONE HYDROCHLORIDE 1 MG: 1 INJECTION, SOLUTION INTRAMUSCULAR; INTRAVENOUS; SUBCUTANEOUS at 17:19

## 2022-11-03 RX ADMIN — SODIUM CHLORIDE, PRESERVATIVE FREE 10 ML: 5 INJECTION INTRAVENOUS at 22:38

## 2022-11-03 RX ADMIN — BUDESONIDE AND FORMOTEROL FUMARATE DIHYDRATE 1 PUFF: 160; 4.5 AEROSOL RESPIRATORY (INHALATION) at 08:37

## 2022-11-03 RX ADMIN — MORPHINE SULFATE 2 MG: 2 INJECTION, SOLUTION INTRAMUSCULAR; INTRAVENOUS at 10:59

## 2022-11-03 RX ADMIN — SODIUM CHLORIDE, PRESERVATIVE FREE 10 ML: 5 INJECTION INTRAVENOUS at 07:00

## 2022-11-03 RX ADMIN — ONDANSETRON 4 MG: 2 INJECTION INTRAMUSCULAR; INTRAVENOUS at 03:13

## 2022-11-03 RX ADMIN — ONDANSETRON 4 MG: 2 INJECTION INTRAMUSCULAR; INTRAVENOUS at 17:16

## 2022-11-03 RX ADMIN — SODIUM CHLORIDE, PRESERVATIVE FREE 10 ML: 5 INJECTION INTRAVENOUS at 03:14

## 2022-11-03 RX ADMIN — MORPHINE SULFATE 2 MG: 2 INJECTION, SOLUTION INTRAMUSCULAR; INTRAVENOUS at 03:18

## 2022-11-03 RX ADMIN — ENOXAPARIN SODIUM 40 MG: 100 INJECTION SUBCUTANEOUS at 08:04

## 2022-11-03 NOTE — ED PROVIDER NOTES
EMERGENCY DEPARTMENT HISTORY AND PHYSICAL EXAM      Date: 11/2/2022  Patient Name: Emili Martinez  Patient Age and Sex: [de-identified] y.o. female     History of Presenting Illness     Chief Complaint   Patient presents with    Abdominal Pain       History Provided By: Patient    HPI: Emili Martinez is an 42-year-old female with a history of partial hysterectomy presenting for abnormal CT and abdominal distention. Patient states that for the past 4 to 5 days has been having abdominal distention, bloating, abdominal pain as well as nausea vomiting. She is also had decreased bowel movements and decreased gas. Saw her primary care doctor today who did an x-ray at first and then was concerned to ordered a CT. Was called that the CT showed a cecal volvulus so sent here. There are no other complaints, changes, or physical findings at this time. PCP: Vee Macdonald MD    Current Facility-Administered Medications on File Prior to Encounter   Medication Dose Route Frequency Provider Last Rate Last Admin    [COMPLETED] diatrizoate mart-diatrizoat sod (MD-GASTROVIEW,GASTROGRAFIN) 66-10 % contrast solution 30 mL  30 mL Oral RAD Sudarshan Magdaleno MD   30 mL at 11/02/22 1803     Current Outpatient Medications on File Prior to Encounter   Medication Sig Dispense Refill    escitalopram oxalate (LEXAPRO) 10 mg tablet       guaiFENesin (Mucinex) 1,200 mg Ta12 ER tablet Take 1,200 mg by mouth two (2) times a day. azithromycin (ZITHROMAX) 500 mg tab Take 1 Tablet by mouth daily. 3 Tablet 0    omeprazole (PRILOSEC OTC) 20 mg tablet Take 20 mg by mouth daily. colestipoL (COLESTID) 1 gram tablet Take 1 Tablet by mouth two (2) times a day. (Patient taking differently: Take 1 g by mouth two (2) times a day. Not helping) 60 Tablet 3    aspirin delayed-release 325 mg tablet Take 1 Tab by mouth two (2) times a day.  Indications: post op DVT prevention 60 Tab 0    fluticasone propionate (FLONASE) 50 mcg/actuation nasal spray 2 Sprays by Both Nostrils route daily. fluticasone-umeclidinium-vilanterol (Trelegy Ellipta) 100-62.5-25 mcg inhaler Take 1 Puff by inhalation daily. cholecalciferol (VITAMIN D3) (1000 Units /25 mcg) tablet Take 2,000 Units by mouth daily. famotidine (PEPCID) 20 mg tablet Take 20 mg by mouth two (2) times a day. lidocaine 3.5 % ptmd by Apply Externally route as needed. levothyroxine (SYNTHROID) 100 mcg tablet Take 100 mcg by mouth Daily (before breakfast). montelukast (SINGULAIR) 10 mg tablet Take 10 mg by mouth daily. tolterodine ER (DETROL LA) 4 mg ER capsule Take 4 mg by mouth daily. albuterol (PROVENTIL HFA, VENTOLIN HFA, PROAIR HFA) 90 mcg/actuation inhaler Take 2 Puffs by inhalation every four (4) hours as needed for Wheezing. MULTIVITAMIN PO Take 1 Tab by mouth daily. Past History     Past Medical History:  Past Medical History:   Diagnosis Date    Abdominal pain, left lower quadrant     Acid reflux     Acoustic neuroma ([facility] 05/2012    GAMMA KNIFE DR Alton Rosa    Arthritis     Cerebral infarct ([facility] 6/27/2022    Change in bowel movement     Chronic obstructive pulmonary disease (HCC)     Chronic pain     left  leg    Colon polyps     BENIGN    Contusion of left wrist 12-    Degeneration of intervertebral disc of lumbar region     Degenerative lumbar disc     Diarrhea     Diverticulitis     Diverticulosis     Dysarthria     GERD (gastroesophageal reflux disease)     GERD (gastroesophageal reflux disease)     H/O fall 12/14/2021    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     Hearing loss of left ear     Heartburn     Hemorrhage of rectum     Hemorrhoids     Hyperkalemia 6/27/2022    Hypothyroid     Hypothyroidism, unspecified 6/27/2022    Intracranial and intraspinal phlebitis and thrombophlebitis     Loose stools     Monoplegia (HCC)     Nausea & vomiting     Other ill-defined conditions(799.89)     cavinous sinus thrombosis    Rib pain 2021    Shingles 2014    Slurred speech     Stroke ([address] 2012    CAVERNOUS SINUS THROMBOSIS; RIGHT HAND WEAKNESS.     Unspecified lack of coordination        Past Surgical History:  Past Surgical History:   Procedure Laterality Date    COLONOSCOPY  2015    COLONOSCOPY  2003    FLEXIBLE SIGMOIDOSCOPY    COLONOSCOPY  2006    COLONOSCOPY  07/10/2009    COLONOSCOPY,DIAGNOSTIC      HC SLNG OBTURATOR SYS TVT GYNECA      HX CATARACT REMOVAL      HX CERVICAL FUSION  2011    C5-7    HX CHOLECYSTECTOMY  2010    HX COLONOSCOPY  2016    HX GYN  2008    COLOPORRHAPHY    HX HEART CATHETERIZATION  2016    NEGATIVE PER PATIENT    HX HEENT Left     BONE INDUCTION FOR HEARING AID    HX HIP REPLACEMENT Right 2018    DR Donavan Mata    HX HYSTERECTOMY  1976    HX HYSTERECTOMY  1976    HX LUMBAR FUSION  2014    HX ORTHOPAEDIC  2006    TRIGGER FINGER    HX ORTHOPAEDIC      R KNEE REPLACEMENT    HX ORTHOPAEDIC      HAND SURGERY    HX ORTHOPAEDIC      CERVICAL FUSION    HX OTHER SURGICAL  2012    GAMMA KNIFE RADIATION    HX OTHER SURGICAL      BLADDER SURGERY    HX OTHER SURGICAL  1965    ANAL FISTULA    HX REFRACTIVE SURGERY Bilateral     LASIK    HX TONSILLECTOMY  1948    HX TONSILLECTOMY      ME COMBINED ANT/POST COLPORRHAPHY      ME REMOVAL OF ANAL FISSURE  1965       Family History:  Family History   Problem Relation Age of Onset    Other Mother         MYASTHENIA GRAVIS    Diabetes Mother [de-identified]    Cancer Brother         bladder    Clotting Disorder Father     No Known Problems Son     No Known Problems Son     No Known Problems Daughter     Heart Disease Other     Hypertension Other     Anesth Problems Neg Hx        Social History:  Social History     Tobacco Use    Smoking status: Former     Packs/day: 1.00     Years: 30.00     Pack years: 30.00     Types: Cigarettes     Quit date: 1987     Years since quittin.7    Smokeless tobacco: Never   Vaping Use    Vaping Use: Never used   Substance Use Topics    Alcohol use: Yes     Alcohol/week: 5.8 standard drinks     Types: 7 Glasses of wine per week    Drug use: Not Currently       Allergies: Allergies   Allergen Reactions    Ceclor [Cefaclor] Hives    Golytely [Peg-Electrolyte Soln] Other (comments)         Review of Systems   Review of Systems   Constitutional:  Negative for chills and fever. Respiratory:  Negative for cough and shortness of breath. Cardiovascular:  Negative for chest pain. Gastrointestinal:  Positive for abdominal distention, abdominal pain, constipation, nausea and vomiting. Genitourinary:  Negative for dysuria, frequency and hematuria. Neurological:  Negative for weakness and numbness. All other systems reviewed and are negative. Physical Exam   Physical Exam  Vitals and nursing note reviewed. Constitutional:       Appearance: She is well-developed. HENT:      Head: Normocephalic and atraumatic. Nose: Nose normal.      Mouth/Throat:      Mouth: Mucous membranes are moist.   Eyes:      Extraocular Movements: Extraocular movements intact. Conjunctiva/sclera: Conjunctivae normal.   Cardiovascular:      Rate and Rhythm: Normal rate and regular rhythm. Pulmonary:      Effort: Pulmonary effort is normal. No respiratory distress. Breath sounds: Normal breath sounds. Abdominal:      General: There is no distension. Palpations: Abdomen is soft. Tenderness: There is generalized abdominal tenderness. Comments: Abdomen is distended but soft   Musculoskeletal:         General: Normal range of motion. Cervical back: Normal range of motion and neck supple. Skin:     General: Skin is warm and dry. Neurological:      General: No focal deficit present. Mental Status: She is alert and oriented to person, place, and time. Mental status is at baseline.    Psychiatric:         Mood and Affect: Mood normal.        Diagnostic Study Results     Labs -     Recent Results (from the past 12 hour(s))   CBC WITH AUTOMATED DIFF    Collection Time: 11/02/22 10:27 PM   Result Value Ref Range    WBC 15.9 (H) 3.6 - 11.0 K/uL    RBC 4.52 3.80 - 5.20 M/uL    HGB 14.3 11.5 - 16.0 g/dL    HCT 43.5 35.0 - 47.0 %    MCV 96.2 80.0 - 99.0 FL    MCH 31.6 26.0 - 34.0 PG    MCHC 32.9 30.0 - 36.5 g/dL    RDW 12.4 11.5 - 14.5 %    PLATELET 338 241 - 289 K/uL    MPV 10.2 8.9 - 12.9 FL    NRBC 0.0 0.0  WBC    ABSOLUTE NRBC 0.00 0.00 - 0.01 K/uL    NEUTROPHILS 79 (H) 32 - 75 %    LYMPHOCYTES 12 12 - 49 %    MONOCYTES 7 5 - 13 %    EOSINOPHILS 2 0 - 7 %    BASOPHILS 0 0 - 1 %    IMMATURE GRANULOCYTES 0 0 - 0.5 %    ABS. NEUTROPHILS 12.5 (H) 1.8 - 8.0 K/UL    ABS. LYMPHOCYTES 2.0 0.8 - 3.5 K/UL    ABS. MONOCYTES 1.1 (H) 0.0 - 1.0 K/UL    ABS. EOSINOPHILS 0.3 0.0 - 0.4 K/UL    ABS. BASOPHILS 0.0 0.0 - 0.1 K/UL    ABS. IMM. GRANS. 0.1 (H) 0.00 - 0.04 K/UL    DF AUTOMATED         Radiologic Studies -   No orders to display     CT Results  (Last 48 hours)                 11/02/22 1802  CT ABD PELV WO CONT Final result    Impression:  Concern for cecal volvulus with distention of the cecum and distal small bowel. The findings were discussed with the referring physician on 11/2/2022 at 8:00 PM   by myself. [id number]       Narrative:  EXAM: CT ABD PELV WO CONT       INDICATION: Abdominal pain and distention       COMPARISON: None       IV CONTRAST: None. ORAL CONTRAST: Oral contrast was administered to better evaluate the bowel. TECHNIQUE:    Thin axial images were obtained through the abdomen and pelvis. Coronal and   sagittal reformats were generated. CT dose reduction was achieved through use of   a standardized protocol tailored for this examination and automatic exposure   control for dose modulation. The absence of intravenous contrast material reduces the sensitivity for   evaluation of the vasculature and solid organs.        FINDINGS:    LOWER THORAX: No significant abnormality in the incidentally imaged lower chest.   LIVER: The contour the liver is somewhat nodular suggesting underlying   cirrhosis. No focal abnormality is identified. BILIARY TREE: Status post cholecystectomy. CBD is not dilated. SPLEEN: within normal limits. PANCREAS: No focal abnormality. ADRENALS: Unremarkable. KIDNEYS/URETERS: Small left renal cysts, no follow-up required. No renal or   ureteral stone or evidence of hydronephrosis. STOMACH: Small hiatal hernia. SMALL BOWEL: Distention of the distal ileum is noted. COLON: No substantial distention of the cecum concerning for cecal volvulus. APPENDIX: Not distended. PERITONEUM: No ascites or pneumoperitoneum. RETROPERITONEUM: No lymphadenopathy or aortic aneurysm. REPRODUCTIVE ORGANS: The uterus is surgically absent. URINARY BLADDER: Decompressed. BONES: The patient is status post right total hip replacement. Postoperative   changes are seen in the lumbar spine. ABDOMINAL WALL: No mass or hernia. ADDITIONAL COMMENTS: N/A                 CXR Results  (Last 48 hours)      None              Medical Decision Making   I am the first provider for this patient. I reviewed the vital signs, available nursing notes, past medical history, past surgical history, family history and social history. Vital Signs-Reviewed the patient's vital signs. Patient Vitals for the past 12 hrs:   Temp Pulse Resp BP SpO2   11/02/22 2203 97.9 °F (36.6 °C) (!) 104 18 (!) 161/75 96 %       Records Reviewed: Nursing Notes and Old Medical Records    Provider Notes (Medical Decision Making):   Patient presenting for known cecal volvulus seen on CT. Reviewed the CT which definitely shows this volvulus. Will get labs and speak with GI and general surgery about admission. ED Course:   Initial assessment performed. The patients presenting problems have been discussed, and they are in agreement with the care plan formulated and outlined with them.   I have encouraged them to ask questions as they arise throughout their visit. ED Course as of 11/02/22 2255   Wed Nov 02, 2022 2237 Spoke with CHIKIS David who recommended keeping patient n.p.o. and consulting surgery [JS]   2238 With Dr. Leyla Grace, who recommended NG tube given slight abdominal distention inpatient. Agrees with admission to hospitalist service. [JS]      ED Course User Index  [JS] Vera Peralta MD     Critical Care Time:   0    Disposition:    Admission Note:  Patient is being admitted to the hospital by Dr. Albertina Casper, Service: Hospitalist.  The results of their tests and reasons for their admission have been discussed with them and available family. They convey agreement and understanding for the need to be admitted and for their admission diagnosis. Diagnosis     Clinical Impression:   1. Cecal volvulus (HCC)        Attestations:  Yaron OSUNA M.D., am the primary clinician of record. Please note that this dictation was completed with [employer], the computer voice recognition software. Quite often unanticipated grammatical, syntax, homophones, and other interpretive errors are inadvertently transcribed by the computer software. Please disregard these errors. Please excuse any errors that have escaped final proofreading. Thank you.

## 2022-11-03 NOTE — PROGRESS NOTES
ENCOUNTER NOTE    NG tube decompression  PICC live for IV nutrition   Possibly laproscopic R- hemicolectomy for cecal volvolus.

## 2022-11-03 NOTE — ED NOTES
Assumed care of this pt at this time from [facility].  No distress noted at this time

## 2022-11-03 NOTE — CONSULTS
Consult    Patient: Smita Barlow MRN: [id number]  SSN: [id number]    YOB: 1940  Age: [de-identified] y.o. Sex: female      Subjective:      Smita Barlow is a [de-identified] y.o. female who is being seen for abnormal CT. Dacia Garcia History from medical records, patient had difficult hearing   she presented to the ED with chief complaint of nausea and vomiting for days  Worsening today. Had lower quadrant abdominal pain and abdominal bloating. Saw her primary care doctor today who did an x-ray at first and then was concerned that  CT showed a cecal volvulus so sent here. in ER her vital stable NG tube placed, on suction, IV hydrations  Past Medical History:   Diagnosis Date    Abdominal pain, left lower quadrant     Acid reflux     Acoustic neuroma ([address] 05/2012    GAMMA KNIFE DR Sonido Briggs    Arthritis     Cerebral infarct ([facility] [address] 6/27/2022    Change in bowel movement     Chronic obstructive pulmonary disease (HCC)     Chronic pain     left  leg    Colon polyps     BENIGN    Contusion of left wrist 12-    Degeneration of intervertebral disc of lumbar region     Degenerative lumbar disc     Diarrhea     Diverticulitis     Diverticulosis     Dysarthria     GERD (gastroesophageal reflux disease)     GERD (gastroesophageal reflux disease)     H/O fall 12/14/2021    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 Hearing loss of left ear     Heartburn     Hemorrhage of rectum     Hemorrhoids     Hyperkalemia 6/27/2022    Hypothyroid     Hypothyroidism, unspecified 6/27/2022    Intracranial and intraspinal phlebitis and thrombophlebitis     Loose stools     Monoplegia (HCC)     Nausea & vomiting     Other ill-defined conditions(799.89)     cavinous sinus thrombosis    Rib pain 12/14/2021    Shingles 2014    Slurred speech     Stroke ([address] 08/2012    CAVERNOUS SINUS THROMBOSIS; RIGHT HAND WEAKNESS.     Unspecified lack of coordination      Past Surgical History:   Procedure Laterality Date COLONOSCOPY  2015    COLONOSCOPY  2003    FLEXIBLE SIGMOIDOSCOPY    COLONOSCOPY  2006    COLONOSCOPY  07/10/2009    COLONOSCOPY,DIAGNOSTIC      HC SLNG OBTURATOR SYS TVT GYNECA  2008    HX CATARACT REMOVAL      HX CERVICAL FUSION  2011    C5-7    HX CHOLECYSTECTOMY  2010    HX COLONOSCOPY  2016    HX GYN  2008    COLOPORRHAPHY    HX HEART CATHETERIZATION  2016    NEGATIVE PER PATIENT    HX HEENT Left 2017    BONE INDUCTION FOR HEARING AID    HX HIP REPLACEMENT Right 2018    DR Bridget Dawson [place]    HX HYSTERECTOMY  1976    HX HYSTERECTOMY  1976    HX LUMBAR FUSION  2014    HX ORTHOPAEDIC  2006    TRIGGER FINGER    HX ORTHOPAEDIC      R KNEE REPLACEMENT    HX ORTHOPAEDIC      HAND SURGERY    HX ORTHOPAEDIC      CERVICAL FUSION    HX OTHER SURGICAL  2012    GAMMA KNIFE RADIATION    HX OTHER SURGICAL      BLADDER SURGERY    HX OTHER SURGICAL  1965    ANAL FISTULA    HX REFRACTIVE SURGERY Bilateral     LASIK    HX TONSILLECTOMY  1948    HX TONSILLECTOMY      OR COMBINED ANT/POST COLPORRHAPHY      OR REMOVAL OF ANAL FISSURE  1965      Family History   Problem Relation Age of Onset    Other Mother         MYASTHENIA GRAVIS    Diabetes Mother [de-identified]    Cancer Brother         bladder    Clotting Disorder Father     No Known Problems Son     No Known Problems Son     No Known Problems Daughter     Heart Disease Other     Hypertension Other     Anesth Problems Neg Hx      Social History     Tobacco Use    Smoking status: Former     Packs/day: 1.00     Years: 30.00     Pack years: 30.00     Types: Cigarettes     Quit date: 1987     Years since quittin.7    Smokeless tobacco: Never   Substance Use Topics    Alcohol use:  Yes     Alcohol/week: 5.8 standard drinks     Types: 7 Glasses of wine per week      Current Facility-Administered Medications   Medication Dose Route Frequency Provider Last Rate Last Admin    albuterol (PROVENTIL HFA, VENTOLIN HFA, PROAIR HFA) inhaler 2 Puff  2 Puff Inhalation Q4H PRN Tabatha Baldwin MD        budesonide-formoteroL [facility]) 160-4.5 mcg/actuation HFA inhaler 1 Puff  1 Puff Inhalation BID RT Tabatha Baldwin MD   1 Puff at 11/03/22 0837    And    tiotropium bromide (SPIRIVA RESPIMAT) 2.5 mcg /actuation  2 Puff Inhalation DAILY Bridget Martin MD   2 Puff at 11/03/22 0837    sodium chloride (NS) flush 5-40 mL  5-40 mL IntraVENous Q8H Bridget Martin MD   10 mL at 11/03/22 0700    sodium chloride (NS) flush 5-40 mL  5-40 mL IntraVENous PRN Tabatha Baldwin MD        acetaminophen (TYLENOL) tablet 650 mg  650 mg Oral Q6H PRN Bridget Martin MD        Or    acetaminophen (TYLENOL) suppository 650 mg  650 mg Rectal Q6H PRN Bridget Martin MD        ondansetron (ZOFRAN ODT) tablet 4 mg  4 mg Oral Q8H PRN Tabatha Baldwin MD        Or    ondansetron (ZOFRAN) injection 4 mg  4 mg IntraVENous Q6H PRN Tabatha Baldwin MD   4 mg at 11/03/22 0313    enoxaparin (LOVENOX) injection 40 mg  40 mg SubCUTAneous DAILY Bridget Martin MD   40 mg at 11/03/22 0804    labetaloL (NORMODYNE;TRANDATE) 20 mg/4 mL (5 mg/mL) injection 10 mg  10 mg IntraVENous Q4H PRN Tabatha Baldwin MD        morphine injection 2 mg  2 mg IntraVENous Q4H PRN Tabatha Baldwin MD   2 mg at 11/03/22 0318    glucose chewable tablet 16 g  4 Tablet Oral PRN Tabatha Baldwin MD        glucagon (GLUCAGEN) injection 1 mg  1 mg IntraMUSCular PRN Tabatha Baldwin MD        dextrose 10% infusion 0-250 mL  0-250 mL IntraVENous PRN Tabatha Baldwin MD        insulin lispro (HUMALOG) injection   SubCUTAneous Q6H Bridget Martin MD        TPN ADULT - CENTRAL AA 5% D20% W/ CA + ELECTROLYTES   IntraVENous CONTINUOUS Calos Forman MD         Current Outpatient Medications   Medication Sig Dispense Refill    escitalopram oxalate (LEXAPRO) 10 mg tablet       guaiFENesin (Mucinex) 1,200 mg Ta12 ER tablet Take 1,200 mg by mouth two (2) times a day. azithromycin (ZITHROMAX) 500 mg tab Take 1 Tablet by mouth daily.  3 Tablet 0    omeprazole (PRILOSEC OTC) 20 mg tablet Take 20 mg by mouth daily. colestipoL (COLESTID) 1 gram tablet Take 1 Tablet by mouth two (2) times a day. (Patient taking differently: Take 1 g by mouth two (2) times a day. Not helping) 60 Tablet 3    aspirin delayed-release 325 mg tablet Take 1 Tab by mouth two (2) times a day. Indications: post op DVT prevention 60 Tab 0    fluticasone propionate (FLONASE) 50 mcg/actuation nasal spray 2 Sprays by Both Nostrils route daily. fluticasone-umeclidinium-vilanterol (Trelegy Ellipta) 100-62.5-25 mcg inhaler Take 1 Puff by inhalation daily. cholecalciferol (VITAMIN D3) (1000 Units /25 mcg) tablet Take 2,000 Units by mouth daily. famotidine (PEPCID) 20 mg tablet Take 20 mg by mouth two (2) times a day. lidocaine 3.5 % ptmd by Apply Externally route as needed. levothyroxine (SYNTHROID) 100 mcg tablet Take 100 mcg by mouth Daily (before breakfast). montelukast (SINGULAIR) 10 mg tablet Take 10 mg by mouth daily. tolterodine ER (DETROL LA) 4 mg ER capsule Take 4 mg by mouth daily. albuterol (PROVENTIL HFA, VENTOLIN HFA, PROAIR HFA) 90 mcg/actuation inhaler Take 2 Puffs by inhalation every four (4) hours as needed for Wheezing. MULTIVITAMIN PO Take 1 Tab by mouth daily. Allergies   Allergen Reactions    Ceclor [Cefaclor] Hives    Golytely [Peg-Electrolyte Soln] Other (comments)       Review of Systems:  Review of Systems   Unable to perform ROS: Medical condition      Objective:     Vitals:    11/03/22 0801 11/03/22 0838 11/03/22 0842 11/03/22 0901   BP: (!) 141/51   (!) 135/56   Pulse: 84   86   Resp: 19   20   Temp:       SpO2: 90% 93% 96% (!) 89%   Weight:       Height:            Physical Exam:  Physical Exam  Constitutional:       Appearance: She is ill-appearing. HENT:      Head: Atraumatic. Ears:      Comments: Difficult hearing, almost deaf     Mouth/Throat:      Mouth: Mucous membranes are dry.    Eyes:      Extraocular Movements: Extraocular movements intact. Cardiovascular:      Rate and Rhythm: Normal rate. Heart sounds: Normal heart sounds. Pulmonary:      Effort: Pulmonary effort is normal.   Abdominal:      General: Bowel sounds are normal. There is distension. Tenderness: There is abdominal tenderness. Musculoskeletal:      Cervical back: Neck supple. Psychiatric:         Mood and Affect: Mood normal.        Recent Results (from the past 24 hour(s))   METABOLIC PANEL, COMPREHENSIVE    Collection Time: 11/02/22 10:27 PM   Result Value Ref Range    Sodium 136 136 - 145 mmol/L    Potassium 4.0 3.5 - 5.1 mmol/L    Chloride 100 97 - 108 mmol/L    CO2 29 21 - 32 mmol/L    Anion gap 7 5 - 15 mmol/L    Glucose 124 (H) 65 - 100 mg/dL    BUN 19 6 - 20 mg/dL    Creatinine 0.81 0.55 - 1.02 mg/dL    BUN/Creatinine ratio 23 (H) 12 - 20      eGFR >60 >60 ml/min/1.73m2    Calcium 9.7 8.5 - 10.1 mg/dL    Bilirubin, total 0.7 0.2 - 1.0 mg/dL    AST (SGOT) 27 15 - 37 U/L    ALT (SGPT) 32 12 - 78 U/L    Alk. phosphatase 93 45 - 117 U/L    Protein, total 7.2 6.4 - 8.2 g/dL    Albumin 3.9 3.5 - 5.0 g/dL    Globulin 3.3 2.0 - 4.0 g/dL    A-G Ratio 1.2 1.1 - 2.2     CBC WITH AUTOMATED DIFF    Collection Time: 11/02/22 10:27 PM   Result Value Ref Range    WBC 15.9 (H) 3.6 - 11.0 K/uL    RBC 4.52 3.80 - 5.20 M/uL    HGB 14.3 11.5 - 16.0 g/dL    HCT 43.5 35.0 - 47.0 %    MCV 96.2 80.0 - 99.0 FL    MCH 31.6 26.0 - 34.0 PG    MCHC 32.9 30.0 - 36.5 g/dL    RDW 12.4 11.5 - 14.5 %    PLATELET 813 485 - 184 K/uL    MPV 10.2 8.9 - 12.9 FL    NRBC 0.0 0.0  WBC    ABSOLUTE NRBC 0.00 0.00 - 0.01 K/uL    NEUTROPHILS 79 (H) 32 - 75 %    LYMPHOCYTES 12 12 - 49 %    MONOCYTES 7 5 - 13 %    EOSINOPHILS 2 0 - 7 %    BASOPHILS 0 0 - 1 %    IMMATURE GRANULOCYTES 0 0 - 0.5 %    ABS. NEUTROPHILS 12.5 (H) 1.8 - 8.0 K/UL    ABS. LYMPHOCYTES 2.0 0.8 - 3.5 K/UL    ABS. MONOCYTES 1.1 (H) 0.0 - 1.0 K/UL    ABS. EOSINOPHILS 0.3 0.0 - 0.4 K/UL    ABS.  BASOPHILS 0.0 0.0 - 0.1 K/UL    ABS. IMM. GRANS. 0.1 (H) 0.00 - 0.04 K/UL    DF AUTOMATED     LACTIC ACID    Collection Time: 11/03/22  2:15 AM   Result Value Ref Range    Lactic acid 1.0 0.4 - 2.0 mmol/L   LACTIC ACID    Collection Time: 11/03/22  4:16 AM   Result Value Ref Range    Lactic acid 1.1 0.4 - 2.0 mmol/L        XR CHEST PORT   Final Result   Enteric tube in expected position. IMPRESSION  Concern for cecal volvulus with distention of the cecum and distal small bowel.      Assessment:     Hospital Problems  Date Reviewed: 10/16/2022            Codes Class Noted POA    Vol[facility] ([facility] ICD-10-CM: K56.2  ICD-9-CM: 560.2  11/3/2022 Unknown       , Distention abdominal pain, sigmoid colon    Colon volvulus  Plan:   Npo, continue NG tube suction  Surgeon to follow  IV hydration    We will follow from distance to see further GI study is needed  Signed By: Gerson Alford MD     November 3, 2022         Thank you for allowing me to participate in this patients care  Cc Referring Physician   Samara Quintana MD Previously Declined (within the last year)

## 2022-11-03 NOTE — CONSULTS
History and Physical    Chief complaints: Abdominal pain and distention   History of Presenting Illness:  Ying Enamorado is a [de-identified] y.o. very pleasant woman no prior history of the abdominal problem presents hospital for resolved abdominal pain and swelling. Patient had a previous hysterectomy and some sort of brain lesion surgery,, gamma knife therapy. Patient says the pain is a constant state throughout the abdomen. No other relieving factor. CT scan shows possible cecal volvulus. Past Medical History:   Diagnosis Date    Abdominal pain, left lower quadrant     Acid reflux     Acoustic neuroma ([address] 05/2012    GAMMA KNIFE DR Samara Maldonado    Arthritis     Cerebral infarct ([address] 6/27/2022    Change in bowel movement     Chronic obstructive pulmonary disease (HCC)     Chronic pain     left  leg    Colon polyps     BENIGN    Contusion of left wrist 12-    Degeneration of intervertebral disc of lumbar region     Degenerative lumbar disc     Diarrhea     Diverticulitis     Diverticulosis     Dysarthria     GERD (gastroesophageal reflux disease)     GERD (gastroesophageal reflux disease)     H/O fall 12/14/2021    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 Hearing loss of left ear     Heartburn     Hemorrhage of rectum     Hemorrhoids     Hyperkalemia 6/27/2022    Hypothyroid     Hypothyroidism, unspecified 6/27/2022    Intracranial and intraspinal phlebitis and thrombophlebitis     Loose stools     Monoplegia (HCC)     Nausea & vomiting     Other ill-defined conditions(799.89)     cavinous sinus thrombosis    Rib pain 12/14/2021    Shingles 2014    Slurred speech     Stroke ([address] 08/2012    CAVERNOUS SINUS THROMBOSIS; RIGHT HAND WEAKNESS.     Unspecified lack of coordination       Past Surgical History:   Procedure Laterality Date    COLONOSCOPY  2015    COLONOSCOPY  01/09/2003    FLEXIBLE SIGMOIDOSCOPY    COLONOSCOPY  04/12/2006    COLONOSCOPY  07/10/2009 COLONOSCOPY,DIAGNOSTIC      HC SLNG OBTURATOR SYS TVT GYNECA  2008    HX CATARACT REMOVAL  2014    HX CERVICAL FUSION  2011    C5-7    HX CHOLECYSTECTOMY  2010    HX COLONOSCOPY  2016    HX GYN  2008    COLOPORRHAPHY    HX HEART CATHETERIZATION  2016    NEGATIVE PER PATIENT    HX HEENT Left 2017    BONE INDUCTION FOR HEARING AID    HX HIP REPLACEMENT Right 2018    DR [facility] [place]    HX HYSTERECTOMY  1976    HX HYSTERECTOMY  1976    HX LUMBAR FUSION  2014    HX ORTHOPAEDIC  2006    TRIGGER FINGER    HX ORTHOPAEDIC      R KNEE REPLACEMENT    HX ORTHOPAEDIC      HAND SURGERY    HX ORTHOPAEDIC      CERVICAL FUSION    HX OTHER SURGICAL  2012    GAMMA KNIFE RADIATION    HX OTHER SURGICAL      BLADDER SURGERY    HX OTHER SURGICAL  1965    ANAL FISTULA    HX REFRACTIVE SURGERY Bilateral 2003    LASIK    HX TONSILLECTOMY  1948    HX TONSILLECTOMY      MD COMBINED ANT/POST COLPORRHAPHY  2008    MD REMOVAL OF ANAL FISSURE  1965     Family History   Problem Relation Age of Onset    Other Mother         MYASTHENIA GRAVIS    Diabetes Mother [de-identified]    Cancer Brother         bladder    Clotting Disorder Father     No Known Problems Son     No Known Problems Son     No Known Problems Daughter     Heart Disease Other     Hypertension Other     Anesth Problems Neg Hx       Social History     Tobacco Use    Smoking status: Former     Packs/day: 1.00     Years: 30.00     Pack years: 30.00     Types: Cigarettes     Quit date: 1987     Years since quittin.7    Smokeless tobacco: Never   Substance Use Topics    Alcohol use: Yes     Alcohol/week: 5.8 standard drinks     Types: 7 Glasses of wine per week       Prior to Admission medications    Medication Sig Start Date End Date Taking? Authorizing Provider   escitalopram oxalate (LEXAPRO) 10 mg tablet  10/1/22   Provider, Historical   guaiFENesin (Mucinex) 1,200 mg Ta12 ER tablet Take 1,200 mg by mouth two (2) times a day.     Provider, Historical   azithromycin (ZITHROMAX) 500 mg tab Take 1 Tablet by mouth daily. 10/13/22   Oriana Oden MD   omeprazole (PRILOSEC OTC) 20 mg tablet Take 20 mg by mouth daily. Provider, Historical   colestipoL (COLESTID) 1 gram tablet Take 1 Tablet by mouth two (2) times a day. Patient taking differently: Take 1 g by mouth two (2) times a day. Not helping 8/29/22   Oriana Oden MD   aspirin delayed-release 325 mg tablet Take 1 Tab by mouth two (2) times a day. Indications: post op DVT prevention 4/27/21   Noé Jacques PA-C   fluticasone propionate (FLONASE) 50 mcg/actuation nasal spray 2 Sprays by Both Nostrils route daily. Provider, Historical   fluticasone-umeclidinium-vilanterol (Trelegy Ellipta) 100-62.5-25 mcg inhaler Take 1 Puff by inhalation daily. Provider, Historical   cholecalciferol (VITAMIN D3) (1000 Units /25 mcg) tablet Take 2,000 Units by mouth daily. Provider, Historical   famotidine (PEPCID) 20 mg tablet Take 20 mg by mouth two (2) times a day. Provider, Historical   lidocaine 3.5 % ptmd by Apply Externally route as needed. Provider, Historical   levothyroxine (SYNTHROID) 100 mcg tablet Take 100 mcg by mouth Daily (before breakfast). Provider, Historical   montelukast (SINGULAIR) 10 mg tablet Take 10 mg by mouth daily. Provider, Historical   tolterodine ER (DETROL LA) 4 mg ER capsule Take 4 mg by mouth daily. Provider, Historical   albuterol (PROVENTIL HFA, VENTOLIN HFA, PROAIR HFA) 90 mcg/actuation inhaler Take 2 Puffs by inhalation every four (4) hours as needed for Wheezing. Provider, Historical   MULTIVITAMIN PO Take 1 Tab by mouth daily. Provider, Historical     Allergies   Allergen Reactions    Ceclor [Cefaclor] Hives    Golytely [Peg-Electrolyte Soln] Other (comments)        Review of Systems:  Pertinent review of systems discussed in HPI, and rest of organ systems personally reviewed and they are negative.     Objective:   Vital signs reviewed:      Visit Vitals  BP (!) 135/56   Pulse 86   Temp 97.9 °F (36.6 °C)   Resp 20   Ht 5' 2\" (1.575 m)   Wt 140 lb (63.5 kg)   SpO2 (!) 89%   BMI 25.61 kg/m²       Physical Exam:   General appearance:   Patient is awake and alert, not in particular distress. Head and neck atraumatic normocephalic. ENT shows normal oral mucosa, no jaundice no hoarse voice. Eyes: Pupil equal gaze appropriate. Cardiac system regular rate rhythm. Pulmonary: No audible wheeze. Chest wall: Chest wall excursion normal with respiration cycle, there is no deformity or chest trauma. Abdomen: Soft not tender or distended, bowel sounds active. There is no obvious palpable mass, or hernia. Neurologic: Nonfocal.  Cranial nerves intact, no new focal findings. Musculoskeletal system: Motor function normal limits, motor function 5 out of 5, range of motion normal in all 4 extremity  Skin: Warm and moist.  Hematologic system: No obvious bruising. Psychosocial: Appropriate and cooperative. Vascular examination: Lower and upper extremities warm to touch, no signs of ischemia or cyanosis. Data Review: Labs are reviewed. Discussed  Recent Results (from the past 24 hour(s))   METABOLIC PANEL, COMPREHENSIVE    Collection Time: 11/02/22 10:27 PM   Result Value Ref Range    Sodium 136 136 - 145 mmol/L    Potassium 4.0 3.5 - 5.1 mmol/L    Chloride 100 97 - 108 mmol/L    CO2 29 21 - 32 mmol/L    Anion gap 7 5 - 15 mmol/L    Glucose 124 (H) 65 - 100 mg/dL    BUN 19 6 - 20 mg/dL    Creatinine 0.81 0.55 - 1.02 mg/dL    BUN/Creatinine ratio 23 (H) 12 - 20      eGFR >60 >60 ml/min/1.73m2    Calcium 9.7 8.5 - 10.1 mg/dL    Bilirubin, total 0.7 0.2 - 1.0 mg/dL    AST (SGOT) 27 15 - 37 U/L    ALT (SGPT) 32 12 - 78 U/L    Alk.  phosphatase 93 45 - 117 U/L    Protein, total 7.2 6.4 - 8.2 g/dL    Albumin 3.9 3.5 - 5.0 g/dL    Globulin 3.3 2.0 - 4.0 g/dL    A-G Ratio 1.2 1.1 - 2.2     CBC WITH AUTOMATED DIFF    Collection Time: 11/02/22 10:27 PM   Result Value Ref Range    WBC 15.9 (H) 3.6 - 11.0 K/uL    RBC 4.52 3.80 - 5.20 M/uL    HGB 14.3 11.5 - 16.0 g/dL    HCT 43.5 35.0 - 47.0 %    MCV 96.2 80.0 - 99.0 FL    MCH 31.6 26.0 - 34.0 PG    MCHC 32.9 30.0 - 36.5 g/dL    RDW 12.4 11.5 - 14.5 %    PLATELET 164 308 - 575 K/uL    MPV 10.2 8.9 - 12.9 FL    NRBC 0.0 0.0  WBC    ABSOLUTE NRBC 0.00 0.00 - 0.01 K/uL    NEUTROPHILS 79 (H) 32 - 75 %    LYMPHOCYTES 12 12 - 49 %    MONOCYTES 7 5 - 13 %    EOSINOPHILS 2 0 - 7 %    BASOPHILS 0 0 - 1 %    IMMATURE GRANULOCYTES 0 0 - 0.5 %    ABS. NEUTROPHILS 12.5 (H) 1.8 - 8.0 K/UL    ABS. LYMPHOCYTES 2.0 0.8 - 3.5 K/UL    ABS. MONOCYTES 1.1 (H) 0.0 - 1.0 K/UL    ABS. EOSINOPHILS 0.3 0.0 - 0.4 K/UL    ABS. BASOPHILS 0.0 0.0 - 0.1 K/UL    ABS. IMM. GRANS. 0.1 (H) 0.00 - 0.04 K/UL    DF AUTOMATED     LACTIC ACID    Collection Time: 11/03/22  2:15 AM   Result Value Ref Range    Lactic acid 1.0 0.4 - 2.0 mmol/L   LACTIC ACID    Collection Time: 11/03/22  4:16 AM   Result Value Ref Range    Lactic acid 1.1 0.4 - 2.0 mmol/L             Imagings reviewed: discussed as below. No name on file. 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 CT scan shows a grossly distended stretch to cecum and right colon. And proximal small bowel dilatation and gastric distention    We talked about managing this problem surgical versus nonsurgical.  Family actually requested to GI for possible colonic decompression. I did advise the family colonic decompression for cecal volvulus is not optimal therapy. Due to high likelihood of recurrence, and risk of a perforation during the procedure. We talked about further surgical therapy including possibly laparoscopic. Recommended choice of surgery is a right hemicolectomy. Patient currently malnourished as well as anticipate to have not to have any oral intakes and to recover from surgery.   Therefore I did recommend PICC line placement today and start IV nutrition. And as well as I discussed with the patient and family in order for me to laparoscopic surgery to be feasible GI content and small bowel. Patient is to be decompressed. Therefore patient will need to continue IV fluid hydration NG tube decompression. Family is agreeable for laparoscopic right hemicolectomy for cecal volvulus.

## 2022-11-03 NOTE — ED TRIAGE NOTES
Pt states she has had abd pain and vomiting for 4 days. States she had a CT scan at [facility] and they told her to come here due to colon volvulus.

## 2022-11-03 NOTE — ED NOTES
Dr Crissy Reddy updated on patients increased pain and nausea, he is at the bedside with the patient at this time

## 2022-11-03 NOTE — H&P
History and Physical    Patient: Baljinder Olivera MRN: [id number]  SSN: [id number]    YOB: 1940  Age: [de-identified] y.o. Sex: female      Subjective:      Baljinder Olivera is a [de-identified] y.o. female with PMH of CVA, diabetes mellitus, COPD, hypothyroidism GERD, hearing loss with bone-conduction hearing aid and other medical problems as below. She presented to the ED with chief complaint of nausea and vomiting start about 4 days ago. Worsening today. Associated with left lower quadrant abdominal pain and abdominal bloating. Pain rated 7/10, constant, achy in nature. No known exacerbating or relieving factor. Saw her primary care doctor today who did an x-ray at first and then was concerned to ordered a CT. Was called that the CT showed a cecal volvulus so sent here. In the ED, vital stable. Surgeon and GI consulted from ED, recommended n.p.o. with NG tube. Lab work revealed leukocytosis. Past Medical History:   Diagnosis Date    Abdominal pain, left lower quadrant     Acid reflux     Acoustic neuroma ([facility] [address] 05/2012    GAMMA KNIFE DR Rochelle Tolliver    Arthritis     Cerebral infarct ([facility] [address] 6/27/2022    Change in bowel movement     Chronic obstructive pulmonary disease (HCC)     Chronic pain     left  leg    Colon polyps     BENIGN    Contusion of left wrist 12-    Degeneration of intervertebral disc of lumbar region     Degenerative lumbar disc     Diarrhea     Diverticulitis     Diverticulosis     Dysarthria     GERD (gastroesophageal reflux disease)     GERD (gastroesophageal reflux disease)     H/O fall 12/14/2021    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     Hearing loss of left ear     Heartburn     Hemorrhage of rectum     Hemorrhoids     Hyperkalemia 6/27/2022    Hypothyroid     Hypothyroidism, unspecified 6/27/2022    Intracranial and intraspinal phlebitis and thrombophlebitis     Loose stools     Monoplegia (HCC)     Nausea & vomiting     Other ill-defined conditions(799.89)     cavinous sinus thrombosis    Rib pain 2021    Shingles 2014    Slurred speech     Stroke ([address] 2012    CAVERNOUS SINUS THROMBOSIS; RIGHT HAND WEAKNESS. Unspecified lack of coordination      Past Surgical History:   Procedure Laterality Date    COLONOSCOPY  2015    COLONOSCOPY  2003    FLEXIBLE SIGMOIDOSCOPY    COLONOSCOPY  2006    COLONOSCOPY  07/10/2009    COLONOSCOPY,DIAGNOSTIC      HC SLNG OBTURATOR SYS TVT GYNECA      HX CATARACT REMOVAL      HX CERVICAL FUSION      C5-7    HX CHOLECYSTECTOMY  2010    HX COLONOSCOPY  2016    HX GYN  2008    COLOPORRHAPHY    HX HEART CATHETERIZATION  2016    NEGATIVE PER PATIENT    HX HEENT Left     BONE INDUCTION FOR HEARING AID    HX HIP REPLACEMENT Right 2018    DR One S[facility] [place]    HX HYSTERECTOMY  1976    HX HYSTERECTOMY  1976    HX LUMBAR FUSION  2014    HX ORTHOPAEDIC  2006    TRIGGER FINGER    HX ORTHOPAEDIC      R KNEE REPLACEMENT    HX ORTHOPAEDIC      HAND SURGERY    HX ORTHOPAEDIC      CERVICAL FUSION    HX OTHER SURGICAL  2012    GAMMA KNIFE RADIATION    HX OTHER SURGICAL      BLADDER SURGERY    HX OTHER SURGICAL  1965    ANAL FISTULA    HX REFRACTIVE SURGERY Bilateral     LASIK    HX TONSILLECTOMY  1948    HX TONSILLECTOMY      IL COMBINED ANT/POST COLPORRHAPHY      IL REMOVAL OF ANAL FISSURE  1965      Family History   Problem Relation Age of Onset    Other Mother         MYASTHENIA GRAVIS    Diabetes Mother [de-identified]    Cancer Brother         bladder    Clotting Disorder Father     No Known Problems Son     No Known Problems Son     No Known Problems Daughter     Heart Disease Other     Hypertension Other     Anesth Problems Neg Hx      Social History     Tobacco Use    Smoking status: Former     Packs/day: 1.00     Years: 30.00     Pack years: 30.00     Types: Cigarettes     Quit date: 1987     Years since quittin.7    Smokeless tobacco: Never   Substance Use Topics    Alcohol use:  Yes Alcohol/week: 5.8 standard drinks     Types: 7 Glasses of wine per week      Prior to Admission medications    Medication Sig Start Date End Date Taking? Authorizing Provider   escitalopram oxalate (LEXAPRO) 10 mg tablet  10/1/22   Provider, Historical   guaiFENesin (Mucinex) 1,200 mg Ta12 ER tablet Take 1,200 mg by mouth two (2) times a day. Provider, Historical   azithromycin (ZITHROMAX) 500 mg tab Take 1 Tablet by mouth daily. 10/13/22   Souleymane Bentley MD   omeprazole (PRILOSEC OTC) 20 mg tablet Take 20 mg by mouth daily. Provider, Historical   colestipoL (COLESTID) 1 gram tablet Take 1 Tablet by mouth two (2) times a day. Patient taking differently: Take 1 g by mouth two (2) times a day. Not helping 8/29/22   Souleymane Bentley MD   aspirin delayed-release 325 mg tablet Take 1 Tab by mouth two (2) times a day. Indications: post op DVT prevention 4/27/21   Noé Jacques PA-C   fluticasone propionate (FLONASE) 50 mcg/actuation nasal spray 2 Sprays by Both Nostrils route daily. Provider, Historical   fluticasone-umeclidinium-vilanterol (Trelegy Ellipta) 100-62.5-25 mcg inhaler Take 1 Puff by inhalation daily. Provider, Historical   cholecalciferol (VITAMIN D3) (1000 Units /25 mcg) tablet Take 2,000 Units by mouth daily. Provider, Historical   famotidine (PEPCID) 20 mg tablet Take 20 mg by mouth two (2) times a day. Provider, Historical   lidocaine 3.5 % ptmd by Apply Externally route as needed. Provider, Historical   levothyroxine (SYNTHROID) 100 mcg tablet Take 100 mcg by mouth Daily (before breakfast). Provider, Historical   montelukast (SINGULAIR) 10 mg tablet Take 10 mg by mouth daily. Provider, Historical   tolterodine ER (DETROL LA) 4 mg ER capsule Take 4 mg by mouth daily. Provider, Historical   albuterol (PROVENTIL HFA, VENTOLIN HFA, PROAIR HFA) 90 mcg/actuation inhaler Take 2 Puffs by inhalation every four (4) hours as needed for Wheezing.     Provider, Historical MULTIVITAMIN PO Take 1 Tab by mouth daily. Provider, Historical        Allergies   Allergen Reactions    Ceclor [Cefaclor] Hives    Golytely [Peg-Electrolyte Soln] Other (comments)       Review of Systems:   Constitutional: No fevers, No chills, No fatigue, No weakness  Eyes: No visual disturbance  Ears, Nose, Mouth, Throat, and Face: No nasal congestion, No sore throat  Respiratory: No cough, No sputum, No wheezing, No SOB  Cardiovascular: No chest pain, No lower extremity edema, No Palpitations   Gastrointestinal: See HPI  Genitourinary: No frequency, No dysuria, No hematuria  Integument/Breast: No rash, No skin lesion(s), No dryness  Musculoskeletal: No arthralgias, No neck pain, No back pain  Neurological: No headaches, No dizziness, No confusion,  No seizures  Behavioral/Psychiatric: No anxiety, No depression      Objective:     Vitals:    11/02/22 2245 11/02/22 2303 11/02/22 2310 11/02/22 2333   BP: (!) 161/69 (!) 167/77  (!) 168/87   Pulse: 98 (!) 109 (!) 106 (!) 103   Resp: 21 17 21 23   Temp:       SpO2: 94% 96% 95% 94%   Weight:       Height:            Physical Exam:   General: alert, cooperative, no distress  Eye: conjunctivae/corneas clear. PERRL, EOM's intact. Throat and Neck: normal and no erythema or exudates noted. No mass   Lung: clear to auscultation bilaterally  Heart: regular rate and rhythm,   Abdomen: soft, lower abdomen-tender. Bowel sounds normal. No masses,  Extremities:  able to move all extremities normal, atraumatic  Skin: Normal.  Neurologic: AOx3. Motor function and sensation grossly intact.   Psychiatric: non focal    Recent Results (from the past 24 hour(s))   METABOLIC PANEL, COMPREHENSIVE    Collection Time: 11/02/22 10:27 PM   Result Value Ref Range    Sodium 136 136 - 145 mmol/L    Potassium 4.0 3.5 - 5.1 mmol/L    Chloride 100 97 - 108 mmol/L    CO2 29 21 - 32 mmol/L    Anion gap 7 5 - 15 mmol/L    Glucose 124 (H) 65 - 100 mg/dL    BUN 19 6 - 20 mg/dL    Creatinine 0.81 0.55 - 1.02 mg/dL    BUN/Creatinine ratio 23 (H) 12 - 20      eGFR >60 >60 ml/min/1.73m2    Calcium 9.7 8.5 - 10.1 mg/dL    Bilirubin, total 0.7 0.2 - 1.0 mg/dL    AST (SGOT) 27 15 - 37 U/L    ALT (SGPT) 32 12 - 78 U/L    Alk. phosphatase 93 45 - 117 U/L    Protein, total 7.2 6.4 - 8.2 g/dL    Albumin 3.9 3.5 - 5.0 g/dL    Globulin 3.3 2.0 - 4.0 g/dL    A-G Ratio 1.2 1.1 - 2.2     CBC WITH AUTOMATED DIFF    Collection Time: 11/02/22 10:27 PM   Result Value Ref Range    WBC 15.9 (H) 3.6 - 11.0 K/uL    RBC 4.52 3.80 - 5.20 M/uL    HGB 14.3 11.5 - 16.0 g/dL    HCT 43.5 35.0 - 47.0 %    MCV 96.2 80.0 - 99.0 FL    MCH 31.6 26.0 - 34.0 PG    MCHC 32.9 30.0 - 36.5 g/dL    RDW 12.4 11.5 - 14.5 %    PLATELET 785 108 - 874 K/uL    MPV 10.2 8.9 - 12.9 FL    NRBC 0.0 0.0  WBC    ABSOLUTE NRBC 0.00 0.00 - 0.01 K/uL    NEUTROPHILS 79 (H) 32 - 75 %    LYMPHOCYTES 12 12 - 49 %    MONOCYTES 7 5 - 13 %    EOSINOPHILS 2 0 - 7 %    BASOPHILS 0 0 - 1 %    IMMATURE GRANULOCYTES 0 0 - 0.5 %    ABS. NEUTROPHILS 12.5 (H) 1.8 - 8.0 K/UL    ABS. LYMPHOCYTES 2.0 0.8 - 3.5 K/UL    ABS. MONOCYTES 1.1 (H) 0.0 - 1.0 K/UL    ABS. EOSINOPHILS 0.3 0.0 - 0.4 K/UL    ABS. BASOPHILS 0.0 0.0 - 0.1 K/UL    ABS. IMM. GRANS. 0.1 (H) 0.00 - 0.04 K/UL    DF AUTOMATED         XR Results (maximum last 3): Results from [place] encounter on 11/02/22    XR CHEST PORT    Narrative  EXAM:  XR CHEST PORT    INDICATION: Nasogastric tube placement. COMPARISON: Chest x-ray 1/4/2011. TECHNIQUE: Upright portable chest AP view    FINDINGS: Enteric tube traverses expected course to below the diaphragm into the  left upper quadrant. Lower cervical ACDF plate and screws and partial  visualization of lumbar posterior analilia and screw spinal fixation hardware are  noted. Osseous structures are otherwise unremarkable. The cardiac silhouette is  within normal limits. The pulmonary vasculature is within normal limits.     The lungs and pleural spaces are clear.    Impression  Enteric tube in expected position. Results from [place] encounter on 08/29/18    XR SPINE SNGL V (CROSS TABLE LAT)    Narrative  INTERPRETATION PROVIDED FOR COMPLIANCE ONLY AT NO CHARGE    FINDINGS: Single intraoperative spot radiograph of the lumbar spine in the  lateral projection demonstrates L3-S1 posterior fusion with pedicle screws and  rods. Alignment is anatomic. No complication is evident. Impression  IMPRESSION: Lumbosacral fusion in progress. XR SPINE SNGL V (CROSS TABLE LAT)    Narrative  INTERPRETATION PROVIDED FOR COMPLIANCE ONLY AT NO CHARGE    FINDINGS: Intraoperative spot radiograph of the lumbar spine in the lateral  projection demonstrates surgical probes overlying the L3 vertebral body. Pedicle  screws are again seen at L5 and S1, unchanged in position. Impression  IMPRESSION: Surgical probes overlie the L3 vertebral body. CT Results (maximum last 3): Results from [place] encounter on 11/02/22    CT ABD PELV WO CONT    Narrative  EXAM: CT ABD PELV WO CONT    INDICATION: Abdominal pain and distention    COMPARISON: None    IV CONTRAST: None. ORAL CONTRAST: Oral contrast was administered to better evaluate the bowel. TECHNIQUE:  Thin axial images were obtained through the abdomen and pelvis. Coronal and  sagittal reformats were generated. CT dose reduction was achieved through use of  a standardized protocol tailored for this examination and automatic exposure  control for dose modulation. The absence of intravenous contrast material reduces the sensitivity for  evaluation of the vasculature and solid organs. FINDINGS:  LOWER THORAX: No significant abnormality in the incidentally imaged lower chest.  LIVER: The contour the liver is somewhat nodular suggesting underlying  cirrhosis. No focal abnormality is identified. BILIARY TREE: Status post cholecystectomy. CBD is not dilated. SPLEEN: within normal limits.   PANCREAS: No focal abnormality. ADRENALS: Unremarkable. KIDNEYS/URETERS: Small left renal cysts, no follow-up required. No renal or  ureteral stone or evidence of hydronephrosis. STOMACH: Small hiatal hernia. SMALL BOWEL: Distention of the distal ileum is noted. COLON: No substantial distention of the cecum concerning for cecal volvulus. APPENDIX: Not distended. PERITONEUM: No ascites or pneumoperitoneum. RETROPERITONEUM: No lymphadenopathy or aortic aneurysm. REPRODUCTIVE ORGANS: The uterus is surgically absent. URINARY BLADDER: Decompressed. BONES: The patient is status post right total hip replacement. Postoperative  changes are seen in the lumbar spine. ABDOMINAL WALL: No mass or hernia. ADDITIONAL COMMENTS: N/A    Impression  Concern for cecal volvulus with distention of the cecum and distal small bowel. The findings were discussed with the referring physician on 11/2/2022 at 8:00 PM  by myself. [id number]      MRI Results (maximum last 3): Results from [place] encounter on 07/19/18    MRI LUMB SPINE WO CONT    Narrative  EXAM:  MRI LUMB SPINE WO CONT    INDICATION:   Lumbar radiculopathy    COMPARISON: None. TECHNIQUE: Multiplanar multisequence acquisition without contrast of the lumbar  spine. CONTRAST: None. FINDINGS:  The last well-formed disk is designated as L5-S1 for the purpose of this report. Vertebral bodies were numbered using this convention. Postsurgical changes of L4-S1 posterior spinal instrumentation fusion and  decompression with L4 and L5 laminectomies. Small amount of expected  postoperative fluid and edema is noted within the laminectomy bed and paraspinal  musculature. No organized fluid collection. Trace anterolisthesis of L4 on L5. Alignment is otherwise within normal limits. Vertebral body heights are maintained without evidence of acute fracture. Marrow  signal is normal. Multilevel degenerative disc disease and facet arthropathy as  detailed below.  The conus is normal in size and signal and terminates at L1-L2. The cauda equina is unremarkable. Bilateral simple renal cysts. T12-L1: Mild diffuse disc bulge. This can spinal canal or neural foraminal  stenosis. L1-L2: Diffuse disc bulge. No significant spinal canal stenosis. Mild left and  no right neural foraminal stenosis. L2-L3: Diffuse disc bulge with mild bilateral facet arthropathy and ligamentum  flavum thickening. No significant spinal canal stenosis. Mild bilateral neural  foraminal stenosis. L3-L4: Diffuse disc bulge with severe bilateral facet arthropathy. Mild spinal  canal stenosis. Severe left and moderate right neural foraminal stenosis. L4-L5: Status post posterior fusion and decompression. Minimal diffuse disc  bulge. No significant spinal canal stenosis. The neural foramina are not well  evaluated due to susceptibility artifact from hardware, but there is likely mild  left and no right neural foraminal stenosis. L5-S1: Status post posterior fusion and decompression. The spinal canal is  decompressed. The neural foramina are not well evaluated due to susceptibility  artifact from hardware, but there is likely mild right and no left neural  foraminal stenosis. Impression  IMPRESSION:  1. Postsurgical changes of L4-S1 PSIF and decompression. 2. Multilevel degenerative disc disease and facet arthropathy as detailed above,  most significant at L3-L4, where there is mild spinal canal stenosis, severe  left and moderate right neural foraminal stenosis. Results from [place] encounter on 03/02/12    MRI BRAIN W AND W/O CONTRAST    Narrative  **Final Report**      ICD Codes / Adm. Diagnosis: 782.0  781.1 / DISTURBANCE OF SKIN SENSATION  DISTURBANCES OF SENSATION OF  Examination:  MR BRAIN W AND WO CON  - [id number] - Mar  2 2012  9:53AM  Accession No:  [id number]  Reason:  disturbance of taste and left facial numbness      REPORT:  INDICATION: Left scalp and facial numbness    Brain MRI was performed with imaging in axial, sagittal, and coronal imaging  planes including T1-weighted imaging performed prior to and following  intravenous injection of 12 mL of Magnevist.    There is a 2.2 cm prominently enhancing mass at the left cerebellopontine  angle, a component of the mass is located within the left internal auditory  canal. This is typical for acoustic neuroma. Adjacent brainstem structures  are displaced mildly toward the midline. No significant ventricular  enlargement is seen. No other intracranial masses are seen. There is no  significant white matter signal abnormality. No acute ischemic abnormalities  are seen. Right-sided cerebellopontine angle appears normal.      IMPRESSION: 2.2 cm left-sided acoustic neuroma. Signing/Reading Doctor: Mancil Cart ([id number])  Approved: Ku6 ([id number])  03/02/2012      Nuclear Medicine Results (maximum last 3): No results found for this or any previous visit. US Results (maximum last 3): No results found for this or any previous visit. Assessment and plan:   # Cecal volvulus  - NG tube and NPO  - GI and surgery consulted    #COPD  -Not in exacerbation  - Continue home medications.      #Hypertension  -Hold oral medications  -IV labetalol as needed    # Diabetes   - POC + correctional insulin   - HbA1c    #Hypothyroidism  - Home oral medications    #GERD  -Hold oral medications    # Full code by default, need further clarification    # Medication list reviewed on [facility] and with patient/family        Signed By: Lien Mishra MD     November 3, 2022

## 2022-11-03 NOTE — PROCEDURES
PICC Placement Note    PRE-PROCEDURE VERIFICATION  Correct Procedure: yes  Correct Site:  yes  Temperature: Temp: 97.9 °F (36.6 °C), Temperature Source: Temp Source: Oral  Recent Labs     11/02/22  2227   BUN 19   CREA 0.81      WBC 15.9*     Allergies: Ceclor [cefaclor] and Golytely [peg-electrolyte soln]  Education materials for PICC Care given to family: yes. See Patient Education activity for further details. PICC Booklet placed on chart: yes    PROCEDURE DETAIL  A double lumen PICC line was started for TPN. The following documentation is in addition to the PICC properties in the lines/airways flowsheet :  Lot #: [id number]  xylocaine used: yes  Mid-Arm Circumference: 29 (cm)  Internal Catheter Length: 36 (cm)  Internal Catheter Total Length: 36 (cm)  Vein Selection for PICC:right basilic  Central Line Bundle followed yes  Complication Related to Insertion: none. Double Lumen PICC line inserted with no complications. Both lumens have brisk blood return and flush with ease. Line secured with BARD securement device, CHG disk, and sterile dressing. Primary RN made aware that the line is placed and can be used at this time. The placement was verified by 3CG: yes. The 3CG results state the tip location is on the right side and the tip overlies the upper superior vena cava.      Line is okay to use: yes    El Doshi RN

## 2022-11-03 NOTE — PROGRESS NOTES
Reason for Admission:  Volvulus of descending colon                     RUR Score: 9%                    Plan for utilizing home health:  Not at this time        PCP: First and Last name:  Ping Burns MD     Name of Practice:    Are you a current patient: Yes/No:    Approximate date of last visit: Yesterday   Can you participate in a virtual visit with your PCP:                     Current Advanced Directive/Advance Care Plan: Full Code      Healthcare Decision Maker:   Click here to complete [address] including selection of the Healthcare Decision Maker Relationship (ie \"Primary\")       Yoseph Lacy, , [phone]                      Transition of Care Plan:     Met f/f with Pt and her , Pt  confirmed that the information on the face sheet is correct. Pt  stated no HH, Pt has a walker and cane and is independent with ADL. Pt  stated they use [facility]. Pt  stated that it is his pharmacy. Pt  stated that he will give Pt a ride home when she is D/C. CM dispo: TBD, CM will follow for D/C needs.

## 2022-11-04 ENCOUNTER — ANESTHESIA (OUTPATIENT)
Dept: SURGERY | Age: 82
DRG: 329 | End: 2022-11-04
Payer: MEDICARE

## 2022-11-04 ENCOUNTER — APPOINTMENT (OUTPATIENT)
Dept: CT IMAGING | Age: 82
DRG: 329 | End: 2022-11-04
Attending: PHYSICIAN ASSISTANT
Payer: MEDICARE

## 2022-11-04 ENCOUNTER — ANESTHESIA EVENT (OUTPATIENT)
Dept: SURGERY | Age: 82
DRG: 329 | End: 2022-11-04
Payer: MEDICARE

## 2022-11-04 LAB
ANION GAP SERPL CALC-SCNC: 5 MMOL/L (ref 5–15)
BASOPHILS # BLD: 0 K/UL (ref 0–0.1)
BASOPHILS NFR BLD: 0 % (ref 0–1)
BUN SERPL-MCNC: 24 MG/DL (ref 6–20)
BUN/CREAT SERPL: 33 (ref 12–20)
CA-I BLD-MCNC: 8.9 MG/DL (ref 8.5–10.1)
CHLORIDE SERPL-SCNC: 105 MMOL/L (ref 97–108)
CO2 SERPL-SCNC: 29 MMOL/L (ref 21–32)
CREAT SERPL-MCNC: 0.72 MG/DL (ref 0.55–1.02)
DIFFERENTIAL METHOD BLD: ABNORMAL
EOSINOPHIL # BLD: 0.2 K/UL (ref 0–0.4)
EOSINOPHIL NFR BLD: 2 % (ref 0–7)
ERYTHROCYTE [DISTWIDTH] IN BLOOD BY AUTOMATED COUNT: 12.4 % (ref 11.5–14.5)
EST. AVERAGE GLUCOSE BLD GHB EST-MCNC: 103 MG/DL
GLUCOSE BLD STRIP.AUTO-MCNC: 111 MG/DL (ref 65–100)
GLUCOSE BLD STRIP.AUTO-MCNC: 115 MG/DL (ref 65–100)
GLUCOSE BLD STRIP.AUTO-MCNC: 99 MG/DL (ref 65–100)
GLUCOSE SERPL-MCNC: 117 MG/DL (ref 65–100)
HBA1C MFR BLD: 5.2 % (ref 4–5.6)
HCT VFR BLD AUTO: 38.4 % (ref 35–47)
HGB BLD-MCNC: 12.6 G/DL (ref 11.5–16)
IMM GRANULOCYTES # BLD AUTO: 0.1 K/UL (ref 0–0.04)
IMM GRANULOCYTES NFR BLD AUTO: 1 % (ref 0–0.5)
LYMPHOCYTES # BLD: 1.4 K/UL (ref 0.8–3.5)
LYMPHOCYTES NFR BLD: 10 % (ref 12–49)
MCH RBC QN AUTO: 31.7 PG (ref 26–34)
MCHC RBC AUTO-ENTMCNC: 32.8 G/DL (ref 30–36.5)
MCV RBC AUTO: 96.7 FL (ref 80–99)
MONOCYTES # BLD: 1.1 K/UL (ref 0–1)
MONOCYTES NFR BLD: 7 % (ref 5–13)
NEUTS SEG # BLD: 11.5 K/UL (ref 1.8–8)
NEUTS SEG NFR BLD: 80 % (ref 32–75)
NRBC # BLD: 0 K/UL (ref 0–0.01)
NRBC BLD-RTO: 0 PER 100 WBC
PERFORMED BY, TECHID: ABNORMAL
PERFORMED BY, TECHID: ABNORMAL
PERFORMED BY, TECHID: NORMAL
PLATELET # BLD AUTO: 281 K/UL (ref 150–400)
PMV BLD AUTO: 10.7 FL (ref 8.9–12.9)
POTASSIUM SERPL-SCNC: 3.9 MMOL/L (ref 3.5–5.1)
RBC # BLD AUTO: 3.97 M/UL (ref 3.8–5.2)
SODIUM SERPL-SCNC: 139 MMOL/L (ref 136–145)
WBC # BLD AUTO: 14.3 K/UL (ref 3.6–11)

## 2022-11-04 PROCEDURE — 36415 COLL VENOUS BLD VENIPUNCTURE: CPT

## 2022-11-04 PROCEDURE — 65270000029 HC RM PRIVATE

## 2022-11-04 PROCEDURE — 77030018813 HC SCIS LAPSCP EPIX DISP AMR -B: Performed by: SURGERY

## 2022-11-04 PROCEDURE — 77030002996 HC SUT SLK J&J -A: Performed by: SURGERY

## 2022-11-04 PROCEDURE — 85025 COMPLETE CBC W/AUTO DIFF WBC: CPT

## 2022-11-04 PROCEDURE — 77030036731 HC STPLR ENDOSC J&J -F: Performed by: SURGERY

## 2022-11-04 PROCEDURE — 83036 HEMOGLOBIN GLYCOSYLATED A1C: CPT

## 2022-11-04 PROCEDURE — 88309 TISSUE EXAM BY PATHOLOGIST: CPT

## 2022-11-04 PROCEDURE — 87186 SC STD MICRODIL/AGAR DIL: CPT

## 2022-11-04 PROCEDURE — 77030008756 HC TU IRR SUC STRY -B: Performed by: SURGERY

## 2022-11-04 PROCEDURE — 74011000250 HC RX REV CODE- 250: Performed by: PHYSICIAN ASSISTANT

## 2022-11-04 PROCEDURE — 74011250636 HC RX REV CODE- 250/636: Performed by: NURSE ANESTHETIST, CERTIFIED REGISTERED

## 2022-11-04 PROCEDURE — 74011250636 HC RX REV CODE- 250/636: Performed by: SURGERY

## 2022-11-04 PROCEDURE — C9113 INJ PANTOPRAZOLE SODIUM, VIA: HCPCS | Performed by: PHYSICIAN ASSISTANT

## 2022-11-04 PROCEDURE — 99232 SBSQ HOSP IP/OBS MODERATE 35: CPT | Performed by: SURGERY

## 2022-11-04 PROCEDURE — 74011250636 HC RX REV CODE- 250/636: Performed by: HOSPITALIST

## 2022-11-04 PROCEDURE — 76210000006 HC OR PH I REC 0.5 TO 1 HR: Performed by: SURGERY

## 2022-11-04 PROCEDURE — 77030009403 HC ELECTRD ENDO MEGA -B: Performed by: SURGERY

## 2022-11-04 PROCEDURE — 77030013567 HC DRN WND RESERV BARD -A: Performed by: SURGERY

## 2022-11-04 PROCEDURE — 77030038552 HC DRN WND MDII -A: Performed by: SURGERY

## 2022-11-04 PROCEDURE — 77030018684: Performed by: SURGERY

## 2022-11-04 PROCEDURE — 80048 BASIC METABOLIC PNL TOTAL CA: CPT

## 2022-11-04 PROCEDURE — 74011000250 HC RX REV CODE- 250: Performed by: NURSE ANESTHETIST, CERTIFIED REGISTERED

## 2022-11-04 PROCEDURE — 77030003029 HC SUT VCRL J&J -B: Performed by: SURGERY

## 2022-11-04 PROCEDURE — 77030018842 HC SOL IRR SOD CL 9% BAXT -A: Performed by: SURGERY

## 2022-11-04 PROCEDURE — 77030040361 HC SLV COMPR DVT MDII -B: Performed by: SURGERY

## 2022-11-04 PROCEDURE — 76010000153 HC OR TIME 1.5 TO 2 HR: Performed by: SURGERY

## 2022-11-04 PROCEDURE — 77030012799 HC TRCR GELPRT BLN AMR -B: Performed by: SURGERY

## 2022-11-04 PROCEDURE — 77030010507 HC ADH SKN DERMBND J&J -B: Performed by: SURGERY

## 2022-11-04 PROCEDURE — 94640 AIRWAY INHALATION TREATMENT: CPT

## 2022-11-04 PROCEDURE — 74011000250 HC RX REV CODE- 250: Performed by: INTERNAL MEDICINE

## 2022-11-04 PROCEDURE — 74011250636 HC RX REV CODE- 250/636: Performed by: PHYSICIAN ASSISTANT

## 2022-11-04 PROCEDURE — 77030009978 HC RELD STPLR TCR J&J -B: Performed by: SURGERY

## 2022-11-04 PROCEDURE — 77030031139 HC SUT VCRL2 J&J -A: Performed by: SURGERY

## 2022-11-04 PROCEDURE — 2709999900 HC NON-CHARGEABLE SUPPLY: Performed by: SURGERY

## 2022-11-04 PROCEDURE — 77030002916 HC SUT ETHLN J&J -A: Performed by: SURGERY

## 2022-11-04 PROCEDURE — 74011250636 HC RX REV CODE- 250/636: Performed by: INTERNAL MEDICINE

## 2022-11-04 PROCEDURE — 87086 URINE CULTURE/COLONY COUNT: CPT

## 2022-11-04 PROCEDURE — 77030002933 HC SUT MCRYL J&J -A: Performed by: SURGERY

## 2022-11-04 PROCEDURE — 77030005513 HC CATH URETH FOL11 MDII -B: Performed by: SURGERY

## 2022-11-04 PROCEDURE — 76060000034 HC ANESTHESIA 1.5 TO 2 HR: Performed by: SURGERY

## 2022-11-04 PROCEDURE — 74011250637 HC RX REV CODE- 250/637: Performed by: INTERNAL MEDICINE

## 2022-11-04 PROCEDURE — 94762 N-INVAS EAR/PLS OXIMTRY CONT: CPT

## 2022-11-04 PROCEDURE — 74011000250 HC RX REV CODE- 250: Performed by: SURGERY

## 2022-11-04 PROCEDURE — 82962 GLUCOSE BLOOD TEST: CPT

## 2022-11-04 PROCEDURE — 77030008606 HC TRCR ENDOSC KII AMR -B: Performed by: SURGERY

## 2022-11-04 PROCEDURE — 77030009527 HC GEL PRT SYS AMR -E: Performed by: SURGERY

## 2022-11-04 PROCEDURE — 74176 CT ABD & PELVIS W/O CONTRAST: CPT

## 2022-11-04 PROCEDURE — 87077 CULTURE AEROBIC IDENTIFY: CPT

## 2022-11-04 PROCEDURE — 88307 TISSUE EXAM BY PATHOLOGIST: CPT

## 2022-11-04 RX ORDER — LIDOCAINE HYDROCHLORIDE 10 MG/ML
INJECTION INFILTRATION; PERINEURAL
Status: DISCONTINUED
Start: 2022-11-04 | End: 2022-11-05

## 2022-11-04 RX ORDER — FENTANYL CITRATE 50 UG/ML
INJECTION, SOLUTION INTRAMUSCULAR; INTRAVENOUS AS NEEDED
Status: DISCONTINUED | OUTPATIENT
Start: 2022-11-04 | End: 2022-11-05 | Stop reason: HOSPADM

## 2022-11-04 RX ORDER — METRONIDAZOLE 500 MG/100ML
INJECTION, SOLUTION INTRAVENOUS AS NEEDED
Status: DISCONTINUED | OUTPATIENT
Start: 2022-11-04 | End: 2022-11-05 | Stop reason: HOSPADM

## 2022-11-04 RX ORDER — LIDOCAINE HYDROCHLORIDE 20 MG/ML
INJECTION, SOLUTION EPIDURAL; INFILTRATION; INTRACAUDAL; PERINEURAL AS NEEDED
Status: DISCONTINUED | OUTPATIENT
Start: 2022-11-04 | End: 2022-11-05 | Stop reason: HOSPADM

## 2022-11-04 RX ORDER — DEXAMETHASONE SODIUM PHOSPHATE 4 MG/ML
INJECTION, SOLUTION INTRA-ARTICULAR; INTRALESIONAL; INTRAMUSCULAR; INTRAVENOUS; SOFT TISSUE AS NEEDED
Status: DISCONTINUED | OUTPATIENT
Start: 2022-11-04 | End: 2022-11-05 | Stop reason: HOSPADM

## 2022-11-04 RX ORDER — LIDOCAINE HYDROCHLORIDE 10 MG/ML
INJECTION INFILTRATION; PERINEURAL AS NEEDED
Status: DISCONTINUED | OUTPATIENT
Start: 2022-11-04 | End: 2022-11-05 | Stop reason: HOSPADM

## 2022-11-04 RX ORDER — SODIUM CHLORIDE, SODIUM LACTATE, POTASSIUM CHLORIDE, CALCIUM CHLORIDE 600; 310; 30; 20 MG/100ML; MG/100ML; MG/100ML; MG/100ML
INJECTION, SOLUTION INTRAVENOUS
Status: DISCONTINUED | OUTPATIENT
Start: 2022-11-04 | End: 2022-11-05 | Stop reason: HOSPADM

## 2022-11-04 RX ORDER — PROPOFOL 10 MG/ML
INJECTION, EMULSION INTRAVENOUS AS NEEDED
Status: DISCONTINUED | OUTPATIENT
Start: 2022-11-04 | End: 2022-11-05 | Stop reason: HOSPADM

## 2022-11-04 RX ORDER — ROCURONIUM BROMIDE 10 MG/ML
INJECTION, SOLUTION INTRAVENOUS AS NEEDED
Status: DISCONTINUED | OUTPATIENT
Start: 2022-11-04 | End: 2022-11-05 | Stop reason: HOSPADM

## 2022-11-04 RX ORDER — CEFAZOLIN SODIUM 1 G/3ML
INJECTION, POWDER, FOR SOLUTION INTRAMUSCULAR; INTRAVENOUS
Status: COMPLETED
Start: 2022-11-04 | End: 2022-11-04

## 2022-11-04 RX ORDER — CEFAZOLIN SODIUM 1 G/3ML
INJECTION, POWDER, FOR SOLUTION INTRAMUSCULAR; INTRAVENOUS AS NEEDED
Status: DISCONTINUED | OUTPATIENT
Start: 2022-11-04 | End: 2022-11-05 | Stop reason: HOSPADM

## 2022-11-04 RX ORDER — SUCCINYLCHOLINE CHLORIDE 20 MG/ML
INJECTION INTRAMUSCULAR; INTRAVENOUS AS NEEDED
Status: DISCONTINUED | OUTPATIENT
Start: 2022-11-04 | End: 2022-11-05 | Stop reason: HOSPADM

## 2022-11-04 RX ORDER — SODIUM CHLORIDE, SODIUM LACTATE, POTASSIUM CHLORIDE, CALCIUM CHLORIDE 600; 310; 30; 20 MG/100ML; MG/100ML; MG/100ML; MG/100ML
75 INJECTION, SOLUTION INTRAVENOUS CONTINUOUS
Status: DISCONTINUED | OUTPATIENT
Start: 2022-11-04 | End: 2022-11-05

## 2022-11-04 RX ADMIN — PROPOFOL 80 MG: 10 INJECTION, EMULSION INTRAVENOUS at 23:07

## 2022-11-04 RX ADMIN — SODIUM CHLORIDE, PRESERVATIVE FREE 10 ML: 5 INJECTION INTRAVENOUS at 22:31

## 2022-11-04 RX ADMIN — METRONIDAZOLE 500 MG: 500 INJECTION, SOLUTION INTRAVENOUS at 23:19

## 2022-11-04 RX ADMIN — SUCCINYLCHOLINE CHLORIDE 100 MG: 20 INJECTION, SOLUTION INTRAMUSCULAR; INTRAVENOUS at 23:07

## 2022-11-04 RX ADMIN — PHENYLEPHRINE HYDROCHLORIDE 100 MCG: 10 INJECTION INTRAVENOUS at 23:24

## 2022-11-04 RX ADMIN — DEXAMETHASONE SODIUM PHOSPHATE 4 MG: 4 INJECTION, SOLUTION INTRA-ARTICULAR; INTRALESIONAL; INTRAMUSCULAR; INTRAVENOUS; SOFT TISSUE at 23:12

## 2022-11-04 RX ADMIN — SODIUM CHLORIDE, POTASSIUM CHLORIDE, SODIUM LACTATE AND CALCIUM CHLORIDE 75 ML/HR: 600; 310; 30; 20 INJECTION, SOLUTION INTRAVENOUS at 15:29

## 2022-11-04 RX ADMIN — SODIUM CHLORIDE, POTASSIUM CHLORIDE, SODIUM LACTATE AND CALCIUM CHLORIDE 75 ML/HR: 600; 310; 30; 20 INJECTION, SOLUTION INTRAVENOUS at 02:21

## 2022-11-04 RX ADMIN — CEFAZOLIN SODIUM 2 G: 1 INJECTION, POWDER, FOR SOLUTION INTRAMUSCULAR; INTRAVENOUS at 23:14

## 2022-11-04 RX ADMIN — FENTANYL CITRATE 50 MCG: 0.05 INJECTION, SOLUTION INTRAMUSCULAR; INTRAVENOUS at 23:18

## 2022-11-04 RX ADMIN — SODIUM CHLORIDE, PRESERVATIVE FREE 40 MG: 5 INJECTION INTRAVENOUS at 12:19

## 2022-11-04 RX ADMIN — BUDESONIDE AND FORMOTEROL FUMARATE DIHYDRATE 1 PUFF: 160; 4.5 AEROSOL RESPIRATORY (INHALATION) at 07:51

## 2022-11-04 RX ADMIN — ROCURONIUM BROMIDE 40 MG: 10 INJECTION, SOLUTION INTRAVENOUS at 23:15

## 2022-11-04 RX ADMIN — HYDROMORPHONE HYDROCHLORIDE 1 MG: 1 INJECTION, SOLUTION INTRAMUSCULAR; INTRAVENOUS; SUBCUTANEOUS at 02:21

## 2022-11-04 RX ADMIN — SODIUM CHLORIDE, PRESERVATIVE FREE 10 ML: 5 INJECTION INTRAVENOUS at 05:15

## 2022-11-04 RX ADMIN — LIDOCAINE HYDROCHLORIDE 60 MG: 20 INJECTION, SOLUTION EPIDURAL; INFILTRATION; INTRACAUDAL; PERINEURAL at 23:07

## 2022-11-04 RX ADMIN — TIOTROPIUM BROMIDE INHALATION SPRAY 2 PUFF: 3.12 SPRAY, METERED RESPIRATORY (INHALATION) at 07:51

## 2022-11-04 RX ADMIN — SODIUM CHLORIDE, POTASSIUM CHLORIDE, SODIUM LACTATE AND CALCIUM CHLORIDE: 600; 310; 30; 20 INJECTION, SOLUTION INTRAVENOUS at 22:58

## 2022-11-04 RX ADMIN — LABETALOL HYDROCHLORIDE 10 MG: 5 INJECTION, SOLUTION INTRAVENOUS at 02:21

## 2022-11-04 NOTE — PROGRESS NOTES
Problem: Pain  Goal: *Control of Pain  Outcome: Progressing Towards Goal     Problem: Nutrition Deficit  Goal: *Optimize nutritional status  Outcome: Progressing Towards Goal     Problem: Patient Education: Go to Patient Education Activity  Goal: Patient/Family Education  Outcome: Progressing Towards Goal     Problem: Patient Education: Go to Patient Education Activity  Goal: Patient/Family Education  Outcome: Progressing Towards Goal

## 2022-11-04 NOTE — PROGRESS NOTES
PROGRESS NOTE      Chief Complaints:  Patient examined this morning. HPI and  Objective:    Patient states pain is better. NG tube output noted. Denies any fever. Review of Systems:  Rest review of system negative, personally reviewed. EXAM:  Visit Vitals  /63 (BP 1 Location: Left upper arm, BP Patient Position: At rest)   Pulse 70   Temp 97.9 °F (36.6 °C)   Resp 17   Ht 5' 2\" (1.575 m)   Wt 140 lb (63.5 kg)   SpO2 98%   BMI 25.61 kg/m²       Patient is awake and alert  Head and neck atraumatic, normocephalic. ENT: No hoarse voice  Cardiac system regular rate rhythm. Pulmonary no audible wheeze  Chest wall excursion normal with respiration cycle  Abdomen is soft not particularly distended. Neurologically nonfocal.  Skin is warm and moist.  Psychosocial: Cooperative. Vascular examination as previously noted no changes.     Recent Results (from the past 24 hour(s))   GLUCOSE, POC    Collection Time: 11/03/22  8:04 PM   Result Value Ref Range    Glucose (POC) 105 (H) 65 - 100 mg/dL    Performed by Josseline Mike RN    GLUCOSE, POC    Collection Time: 11/04/22 12:32 AM   Result Value Ref Range    Glucose (POC) 115 (H) 65 - 100 mg/dL    Performed by [address] [place] [address], BASIC    Collection Time: 11/04/22  5:54 AM   Result Value Ref Range    Sodium 139 136 - 145 mmol/L    Potassium 3.9 3.5 - 5.1 mmol/L    Chloride 105 97 - 108 mmol/L    CO2 29 21 - 32 mmol/L    Anion gap 5 5 - 15 mmol/L    Glucose 117 (H) 65 - 100 mg/dL    BUN 24 (H) 6 - 20 mg/dL    Creatinine 0.72 0.55 - 1.02 mg/dL    BUN/Creatinine ratio 33 (H) 12 - 20      eGFR >60 >60 ml/min/1.73m2    Calcium 8.9 8.5 - 10.1 mg/dL   CBC WITH AUTOMATED DIFF    Collection Time: 11/04/22  5:54 AM   Result Value Ref Range    WBC 14.3 (H) 3.6 - 11.0 K/uL    RBC 3.97 3.80 - 5.20 M/uL    HGB 12.6 11.5 - 16.0 g/dL    HCT 38.4 35.0 - 47.0 %    MCV 96.7 80.0 - 99.0 FL    MCH 31.7 26.0 - 34.0 PG    MCHC 32.8 30.0 - 36.5 g/dL    RDW 12.4 11.5 - 14.5 %    PLATELET 489 301 - 777 K/uL    MPV 10.7 8.9 - 12.9 FL    NRBC 0.0 0.0  WBC    ABSOLUTE NRBC 0.00 0.00 - 0.01 K/uL    NEUTROPHILS 80 (H) 32 - 75 %    LYMPHOCYTES 10 (L) 12 - 49 %    MONOCYTES 7 5 - 13 %    EOSINOPHILS 2 0 - 7 %    BASOPHILS 0 0 - 1 %    IMMATURE GRANULOCYTES 1 (H) 0 - 0.5 %    ABS. NEUTROPHILS 11.5 (H) 1.8 - 8.0 K/UL    ABS. LYMPHOCYTES 1.4 0.8 - 3.5 K/UL    ABS. MONOCYTES 1.1 (H) 0.0 - 1.0 K/UL    ABS. EOSINOPHILS 0.2 0.0 - 0.4 K/UL    ABS. BASOPHILS 0.0 0.0 - 0.1 K/UL    ABS. IMM. GRANS. 0.1 (H) 0.00 - 0.04 K/UL    DF AUTOMATED         ASSESSMENT:   Patient is [de-identified] y.o. with diagnosis of : Active Problems:    Volvulus of descending colon ([facility] [address] (11/3/2022)        PLAN:                 We will repeat CT scan abdomen pelvis. If CT scan abdomen pelvis shows decompression of the small bowel we will proceed with the laparoscopic right colectomy for cecal volvulus. I CT scan as soon as possible.

## 2022-11-04 NOTE — PROGRESS NOTES
Hospitalist Progress Note    Subjective:   Daily Progress Note: 11/4/2022 8:00 AM    Hospital Course: Patient is a 53-year-old female with a history of CVA, diabetes, COPD, hypothyroidism, GERD that presented to the emergency room on 11/2/2022 for nausea, vomiting, abdominal pain for the past 4 days prior. In the ED CT of the abdomen pelvis that showed a cecal volvulus. Vital signs stable. Lab work was significant for leukocytosis. GI consulted and placed NG tube prior to admission. Kept NPO. Started on TPN. Repeat CT of the abdomen pelvis on 11/4/2022 is pending. Subjective: Patient says that she is feeling better. Denies any vomiting. Has occasional nausea. Abdominal pain in the left lower quadrant. Has not passed flatulence or had a bowel movement.     Current Facility-Administered Medications   Medication Dose Route Frequency    lactated Ringers infusion  75 mL/hr IntraVENous CONTINUOUS    albuterol (PROVENTIL HFA, VENTOLIN HFA, PROAIR HFA) inhaler 2 Puff  2 Puff Inhalation Q4H PRN    budesonide-formoteroL (SYMBICORT) 160-4.5 mcg/actuation HFA inhaler 1 Puff  1 Puff Inhalation BID RT    And    tiotropium bromide (SPIRIVA RESPIMAT) 2.5 mcg /actuation  2 Puff Inhalation DAILY    sodium chloride (NS) flush 5-40 mL  5-40 mL IntraVENous Q8H    sodium chloride (NS) flush 5-40 mL  5-40 mL IntraVENous PRN    acetaminophen (TYLENOL) tablet 650 mg  650 mg Oral Q6H PRN    Or    acetaminophen (TYLENOL) suppository 650 mg  650 mg Rectal Q6H PRN    ondansetron (ZOFRAN ODT) tablet 4 mg  4 mg Oral Q8H PRN    Or    ondansetron (ZOFRAN) injection 4 mg  4 mg IntraVENous Q6H PRN    enoxaparin (LOVENOX) injection 40 mg  40 mg SubCUTAneous DAILY    labetaloL (NORMODYNE;TRANDATE) 20 mg/4 mL (5 mg/mL) injection 10 mg  10 mg IntraVENous Q4H PRN    glucose chewable tablet 16 g  4 Tablet Oral PRN    glucagon (GLUCAGEN) injection 1 mg  1 mg IntraMUSCular PRN    dextrose 10% infusion 0-250 mL  0-250 mL IntraVENous PRN insulin lispro (HUMALOG) injection   SubCUTAneous Q6H    TPN ADULT - CENTRAL AA 5% D20% W/ CA + ELECTROLYTES   IntraVENous CONTINUOUS    HYDROmorphone (DILAUDID) injection 1 mg  1 mg IntraVENous Q4H PRN    fluticasone-umeclidinium-vilanterol (TRELEGY ELLIPTA) inhaler 1 Puff  1 Puff Inhalation DAILY PRN        Review of Systems  Constitutional: No fevers, No chills, No sweats, No fatigue, No Weakness  Eyes: No redness  Ears, nose, mouth, throat, and face: No nasal congestion, No sore throat, No voice change  Respiratory: No Shortness of Breath, No cough, No wheezing  Cardiovascular: No chest pain, No palpitations, No extremity edema  Gastrointestinal: + nausea, No vomiting, No diarrhea, + abdominal pain  Genitourinary: No frequency, No dysuria, No hematuria  Integument/breast: No skin lesion(s)   Neurological: No Confusion, No headaches, No dizziness      Objective:     Visit Vitals  /63 (BP 1 Location: Left upper arm, BP Patient Position: At rest)   Pulse 70   Temp 97.9 °F (36.6 °C)   Resp 17   Ht 5' 2\" (1.575 m)   Wt 63.5 kg (140 lb)   SpO2 95%   BMI 25.61 kg/m²      O2 Device: None (Room air)    Temp (24hrs), Av.3 °F (36.8 °C), Min:97.9 °F (36.6 °C), Max:98.5 °F (36.9 °C)      No intake/output data recorded. No intake/output data recorded. PHYSICAL EXAM:  Constitutional: No acute distress  Skin: Extremities and face reveal no rashes. HEENT: Sclerae anicteric. Extra-occular muscles are intact. No oral ulcers. The neck is supple and no masses. Cardiovascular: Regular rate and rhythm. Respiratory:  Clear breath sounds bilaterally with no wheezes, rales, or rhonchi. GI: Abdomen nondistended, soft, and nontender. NG tube in place  Musculoskeletal: No pitting edema of the lower legs. Able to move all ext  Neurological:  Patient is alert and oriented.  Cranial nerves II-XII grossly intact  Psychiatric: Mood appears appropriate       Data Review    Recent Results (from the past 24 hour(s))   GLUCOSE, POC    Collection Time: 11/03/22  8:04 PM   Result Value Ref Range    Glucose (POC) 105 (H) 65 - 100 mg/dL    Performed by Santana Evans RN    GLUCOSE, POC    Collection Time: 11/04/22 12:32 AM   Result Value Ref Range    Glucose (POC) 115 (H) 65 - 100 mg/dL    Performed by Daly Ratliff J, BASIC    Collection Time: 11/04/22  5:54 AM   Result Value Ref Range    Sodium 139 136 - 145 mmol/L    Potassium 3.9 3.5 - 5.1 mmol/L    Chloride 105 97 - 108 mmol/L    CO2 29 21 - 32 mmol/L    Anion gap 5 5 - 15 mmol/L    Glucose 117 (H) 65 - 100 mg/dL    BUN 24 (H) 6 - 20 mg/dL    Creatinine 0.72 0.55 - 1.02 mg/dL    BUN/Creatinine ratio 33 (H) 12 - 20      eGFR >60 >60 ml/min/1.73m2    Calcium 8.9 8.5 - 10.1 mg/dL   CBC WITH AUTOMATED DIFF    Collection Time: 11/04/22  5:54 AM   Result Value Ref Range    WBC 14.3 (H) 3.6 - 11.0 K/uL    RBC 3.97 3.80 - 5.20 M/uL    HGB 12.6 11.5 - 16.0 g/dL    HCT 38.4 35.0 - 47.0 %    MCV 96.7 80.0 - 99.0 FL    MCH 31.7 26.0 - 34.0 PG    MCHC 32.8 30.0 - 36.5 g/dL    RDW 12.4 11.5 - 14.5 %    PLATELET 600 631 - 443 K/uL    MPV 10.7 8.9 - 12.9 FL    NRBC 0.0 0.0  WBC    ABSOLUTE NRBC 0.00 0.00 - 0.01 K/uL    NEUTROPHILS 80 (H) 32 - 75 %    LYMPHOCYTES 10 (L) 12 - 49 %    MONOCYTES 7 5 - 13 %    EOSINOPHILS 2 0 - 7 %    BASOPHILS 0 0 - 1 %    IMMATURE GRANULOCYTES 1 (H) 0 - 0.5 %    ABS. NEUTROPHILS 11.5 (H) 1.8 - 8.0 K/UL    ABS. LYMPHOCYTES 1.4 0.8 - 3.5 K/UL    ABS. MONOCYTES 1.1 (H) 0.0 - 1.0 K/UL    ABS. EOSINOPHILS 0.2 0.0 - 0.4 K/UL    ABS. BASOPHILS 0.0 0.0 - 0.1 K/UL    ABS. IMM. GRANS. 0.1 (H) 0.00 - 0.04 K/UL    DF AUTOMATED         Radiology review: CT of abdomen and pelvis    Assessment:   1. Cecal volvulus  2. COPD without exacerbation  3. Hypertension  4. Type 2 diabetes  5. Hypothyroidism  6.   GERD    Plan:   1.  CT of the on pelvis in the emergency room showed signs concerning for cecal volvulus with distention of the cecum and distal small bowel. Kept patient n.p.o.  NG tube in place with minimal output. Repeat CT on 11/4/2022 pending. General surgery consulted. Pending results of repeat CT will determine if patient will require surgery. TPN ordered  2. Oxygen saturation within normal limits on room air. No signs of exacerbation. Continue Symbicort  3. Blood pressure stable. Continue cardiac monitoring. Holding blood pressure medications as she is n.p.o. and blood pressure is stable. Labetalol as needed  4. Hemoglobin A1c is pending. On insulin sliding scale. 5.  Patient is normally on 100 mcg of thyroid. Currently holding due to n.p.o.  6.  Add IV Protonix  7. CBC BMP in a.m. Dispo: >48 hours. Barriers include possible surgery and return of bowel function. Possibly will need min of home health. However will hold off on PT/OT at this time till more appropriate    CODE STATUS full     DVT prophylaxis: Lovenox  Ulcer prophylaxis: [address] discussed with: Patient/Family, Nurse, and     Total time spent with patient: 34 minutes.

## 2022-11-04 NOTE — CONSULTS
Comprehensive Nutrition Assessment    Type and Reason for Visit: Initial, NPO/clear liquid, Consult (TPN orders/management)    Nutrition Recommendations/Plan:   NPO, initiate PO diet as 3 Carb, GI [place] when medically appropriate. If surgery or NPO >2-3 days planned, consider IVF+D5 in interim for protein sparing kcals. Monitor and document intakes, GI intolerances, BMs in I/Os. Malnutrition Assessment:  Malnutrition Status:  Mild malnutrition (11/04/22 1248)    Context:  Acute illness     Findings of the 6 clinical characteristics of malnutrition:   Energy Intake:  50% or less of est energy requirements for 5 or more days  Weight Loss:  No significant weight loss     Body Fat Loss:  No significant body fat loss,     Muscle Mass Loss:  No significant muscle mass loss,    Fluid Accumulation:  No significant fluid accumulation,     Strength:  Not performed     Nutrition Assessment:    Admitted for N/V, abdominal pain and distention x4 days. (11/2) CT found concern for volvulus. (11/3) RD consulted for TPN. (11/4) CT repeated, showing no volvulus. Pt endorsed decreased intake x7 days d/t early satiety. Denied recent wt loss; however desires such. TLD=951-784# at baseline (self-weighs daily at home), PJR=200# at bedside. RD to trend wts- no wt loss indicated. Currently NPO x2 days. Pt is currently clinically and metabolically stable and has a functional gut as evidenced by CT w/o findings of volvulus, improving abd pain per Pt. As pt gut is functional, best practice is to initiate PO diet vs EN as medically able to promote best nutrition and continued gut function. SHANNA Santos contacted about pt not meeting PN start criteria and d/c of PN order. RD will continue to monitor for appropriateness of starting PN. Labs and meds reviewed. Nutrition Related Findings:    NFPE w/ mild non-acute fat loss per Pt, non nutrition-related. No N/V/D reported. Denies dysphagia hx. Last BM PTA. No edema.  NPO; +NGT re-taped w/ OP. Wound Type: None    Current Nutrition Intake & Therapies:  Average Meal Intake: NPO  Average Supplement Intake: NPO  DIET NPO  TPN ADULT - CENTRAL AA 5% D20% W/ CA + ELECTROLYTES  TPN ADULT - CENTRAL AA 5% D20% W/ CA + ELECTROLYTES    Anthropometric Measures:  Height: 5' 2\" (157.5 cm)  Ideal Body Weight (IBW): 110 lbs (50 kg)  Current Body Wt:  68.9 kg (152 lb) (11/4, RD obtained.), 138.2 % IBW. Bed scale  Current BMI (kg/m2): 27.8  Usual Body Weight: 66.7 kg (147 lb) (1 month ago per chart review.)  % Weight Change (Calculated): 3.4  Weight Adjustment: No adjustment  BMI Category: Overweight (BMI 25.0-29. 9)    Weight Metrics 11/2/2022 10/13/2022 8/29/2022 4/26/2021 4/14/2021 8/29/2018 8/28/2018   Weight 140 lb 147 lb 146 lb 9.6 oz 138 lb 14 oz 138 lb 14.2 oz - 140 lb   BMI 25.61 kg/m2 27.78 kg/m2 27.7 kg/m2 26.24 kg/m2 26.24 kg/m2 26.45 kg/m2 -   Wt gain trended. Pt endorsed no recent weight loss PTA. Estimated Daily Nutrient Needs:  Energy Requirements Based On: Kcal/kg  Weight Used for Energy Requirements: Current  Energy (kcal/day): 1725 kcal/d  Weight Used for Protein Requirements: Current  Protein (g/day): 76 gm/d  Method Used for Fluid Requirements: 1 ml/kcal  Fluid (ml/day): 1750 mL/d    Nutrition Diagnosis:   Inadequate oral intake related to altered GI function as evidenced by NPO or clear liquid status due to medical condition    Nutrition Interventions:   Food and/or Nutrient Delivery: Start oral diet, IV fluid delivery  Nutrition Education/Counseling: No recommendations at this time  Coordination of Nutrition Care: Continue to monitor while inpatient  Plan of Care discussed with: RN, SHANNA Santos, Pt    Goals:  Goals: Meet at least 75% of estimated needs, Initiate PO diet, within 7 days, other (specify)  Specify Other Goals: Initiate means to meet ~50% of EENs within 3-5 days.     Nutrition Monitoring and Evaluation:   Behavioral-Environmental Outcomes: None identified  Food/Nutrient Intake Outcomes: Diet advancement/tolerance, Food and nutrient intake, IVF intake  Physical Signs/Symptoms Outcomes: Biochemical data, GI status, Meal time behavior, Weight, Hemodynamic status    Discharge Planning: Too soon to determine    Ruth Zavala RD  Contact: ext. [phone].

## 2022-11-04 NOTE — PROGRESS NOTES
CM reviewed chart. Possible surgical intervention for patient pending imaging studies. Currently on TPN. Current discharge plans are home with family, awaiting for PT/OT to evaluate patient when appropriate.

## 2022-11-04 NOTE — PROGRESS NOTES
Problem: Pain  Goal: *Control of Pain  Outcome: Progressing Towards Goal     Problem: Patient Education: Go to Patient Education Activity  Goal: Patient/Family Education  Outcome: Progressing Towards Goal     Problem: Nutrition Deficit  Goal: *Optimize nutritional status  Outcome: Progressing Towards Goal     Problem: Patient Education: Go to Patient Education Activity  Goal: Patient/Family Education  Outcome: Progressing Towards Goal     Problem: Falls - Risk of  Goal: *Absence of Falls  Description: Document [place] Fall Risk and appropriate interventions in the flowsheet.   Outcome: Progressing Towards Goal  Note: Fall Risk Interventions:            Medication Interventions: Patient to call before getting OOB    Elimination Interventions: Call light in reach              Problem: Patient Education: Go to Patient Education Activity  Goal: Patient/Family Education  Outcome: Progressing Towards Goal

## 2022-11-05 LAB
ANION GAP SERPL CALC-SCNC: 7 MMOL/L (ref 5–15)
BASOPHILS # BLD: 0 K/UL (ref 0–0.1)
BASOPHILS NFR BLD: 0 % (ref 0–1)
BUN SERPL-MCNC: 20 MG/DL (ref 6–20)
BUN/CREAT SERPL: 43 (ref 12–20)
CA-I BLD-MCNC: 8 MG/DL (ref 8.5–10.1)
CHLORIDE SERPL-SCNC: 106 MMOL/L (ref 97–108)
CO2 SERPL-SCNC: 28 MMOL/L (ref 21–32)
CREAT SERPL-MCNC: 0.46 MG/DL (ref 0.55–1.02)
DIFFERENTIAL METHOD BLD: ABNORMAL
EOSINOPHIL # BLD: 0 K/UL (ref 0–0.4)
EOSINOPHIL NFR BLD: 0 % (ref 0–7)
ERYTHROCYTE [DISTWIDTH] IN BLOOD BY AUTOMATED COUNT: 12.2 % (ref 11.5–14.5)
GLUCOSE BLD STRIP.AUTO-MCNC: 94 MG/DL (ref 65–100)
GLUCOSE SERPL-MCNC: 109 MG/DL (ref 65–100)
HCT VFR BLD AUTO: 35 % (ref 35–47)
HGB BLD-MCNC: 11.2 G/DL (ref 11.5–16)
IMM GRANULOCYTES # BLD AUTO: 0.1 K/UL (ref 0–0.04)
IMM GRANULOCYTES NFR BLD AUTO: 1 % (ref 0–0.5)
LYMPHOCYTES # BLD: 0.7 K/UL (ref 0.8–3.5)
LYMPHOCYTES NFR BLD: 5 % (ref 12–49)
MCH RBC QN AUTO: 31.5 PG (ref 26–34)
MCHC RBC AUTO-ENTMCNC: 32 G/DL (ref 30–36.5)
MCV RBC AUTO: 98.3 FL (ref 80–99)
MONOCYTES # BLD: 1.2 K/UL (ref 0–1)
MONOCYTES NFR BLD: 8 % (ref 5–13)
NEUTS SEG # BLD: 13.2 K/UL (ref 1.8–8)
NEUTS SEG NFR BLD: 86 % (ref 32–75)
NRBC # BLD: 0 K/UL (ref 0–0.01)
NRBC BLD-RTO: 0 PER 100 WBC
PERFORMED BY, TECHID: NORMAL
PLATELET # BLD AUTO: 223 K/UL (ref 150–400)
PMV BLD AUTO: 10.6 FL (ref 8.9–12.9)
POTASSIUM SERPL-SCNC: 4.3 MMOL/L (ref 3.5–5.1)
RBC # BLD AUTO: 3.56 M/UL (ref 3.8–5.2)
SODIUM SERPL-SCNC: 141 MMOL/L (ref 136–145)
WBC # BLD AUTO: 15.2 K/UL (ref 3.6–11)

## 2022-11-05 PROCEDURE — 74011250636 HC RX REV CODE- 250/636: Performed by: SURGERY

## 2022-11-05 PROCEDURE — 0DTF4ZZ RESECTION OF RIGHT LARGE INTESTINE, PERCUTANEOUS ENDOSCOPIC APPROACH: ICD-10-PCS | Performed by: SURGERY

## 2022-11-05 PROCEDURE — 82962 GLUCOSE BLOOD TEST: CPT

## 2022-11-05 PROCEDURE — 74011000250 HC RX REV CODE- 250: Performed by: SURGERY

## 2022-11-05 PROCEDURE — 74011250636 HC RX REV CODE- 250/636: Performed by: ANESTHESIOLOGY

## 2022-11-05 PROCEDURE — 44205 LAP COLECTOMY PART W/ILEUM: CPT | Performed by: SURGERY

## 2022-11-05 PROCEDURE — 94640 AIRWAY INHALATION TREATMENT: CPT

## 2022-11-05 PROCEDURE — 99024 POSTOP FOLLOW-UP VISIT: CPT | Performed by: SURGERY

## 2022-11-05 PROCEDURE — 80048 BASIC METABOLIC PNL TOTAL CA: CPT

## 2022-11-05 PROCEDURE — 94762 N-INVAS EAR/PLS OXIMTRY CONT: CPT

## 2022-11-05 PROCEDURE — 74011000250 HC RX REV CODE- 250: Performed by: NURSE ANESTHETIST, CERTIFIED REGISTERED

## 2022-11-05 PROCEDURE — 65270000029 HC RM PRIVATE

## 2022-11-05 PROCEDURE — 77010033678 HC OXYGEN DAILY

## 2022-11-05 PROCEDURE — 0DNW4ZZ RELEASE PERITONEUM, PERCUTANEOUS ENDOSCOPIC APPROACH: ICD-10-PCS | Performed by: SURGERY

## 2022-11-05 PROCEDURE — C9113 INJ PANTOPRAZOLE SODIUM, VIA: HCPCS | Performed by: SURGERY

## 2022-11-05 PROCEDURE — 85025 COMPLETE CBC W/AUTO DIFF WBC: CPT

## 2022-11-05 PROCEDURE — 74011250636 HC RX REV CODE- 250/636: Performed by: PHYSICIAN ASSISTANT

## 2022-11-05 PROCEDURE — 74011250636 HC RX REV CODE- 250/636: Performed by: NURSE ANESTHETIST, CERTIFIED REGISTERED

## 2022-11-05 RX ORDER — FENTANYL CITRATE 50 UG/ML
50 INJECTION, SOLUTION INTRAMUSCULAR; INTRAVENOUS
Status: COMPLETED | OUTPATIENT
Start: 2022-11-05 | End: 2022-11-05

## 2022-11-05 RX ORDER — DIPHENHYDRAMINE HYDROCHLORIDE 50 MG/ML
12.5 INJECTION, SOLUTION INTRAMUSCULAR; INTRAVENOUS AS NEEDED
Status: DISCONTINUED | OUTPATIENT
Start: 2022-11-05 | End: 2022-11-05 | Stop reason: HOSPADM

## 2022-11-05 RX ORDER — SODIUM CHLORIDE 0.9 % (FLUSH) 0.9 %
5-40 SYRINGE (ML) INJECTION AS NEEDED
Status: DISCONTINUED | OUTPATIENT
Start: 2022-11-05 | End: 2022-11-05 | Stop reason: HOSPADM

## 2022-11-05 RX ORDER — ONDANSETRON 4 MG/1
4 TABLET, ORALLY DISINTEGRATING ORAL
Status: DISCONTINUED | OUTPATIENT
Start: 2022-11-05 | End: 2022-11-10 | Stop reason: HOSPADM

## 2022-11-05 RX ORDER — ENOXAPARIN SODIUM 100 MG/ML
40 INJECTION SUBCUTANEOUS DAILY
Status: DISCONTINUED | OUTPATIENT
Start: 2022-11-05 | End: 2022-11-10 | Stop reason: HOSPADM

## 2022-11-05 RX ORDER — SODIUM CHLORIDE 0.9 % (FLUSH) 0.9 %
5-40 SYRINGE (ML) INJECTION EVERY 8 HOURS
Status: DISCONTINUED | OUTPATIENT
Start: 2022-11-05 | End: 2022-11-07

## 2022-11-05 RX ORDER — SODIUM CHLORIDE 0.9 % (FLUSH) 0.9 %
5-40 SYRINGE (ML) INJECTION AS NEEDED
Status: DISCONTINUED | OUTPATIENT
Start: 2022-11-05 | End: 2022-11-05

## 2022-11-05 RX ORDER — BUDESONIDE AND FORMOTEROL FUMARATE DIHYDRATE 160; 4.5 UG/1; UG/1
2 AEROSOL RESPIRATORY (INHALATION)
Status: DISCONTINUED | OUTPATIENT
Start: 2022-11-05 | End: 2022-11-05 | Stop reason: ALTCHOICE

## 2022-11-05 RX ORDER — SODIUM CHLORIDE 9 MG/ML
75 INJECTION, SOLUTION INTRAVENOUS CONTINUOUS
Status: DISCONTINUED | OUTPATIENT
Start: 2022-11-05 | End: 2022-11-08

## 2022-11-05 RX ORDER — ONDANSETRON 2 MG/ML
4 INJECTION INTRAMUSCULAR; INTRAVENOUS AS NEEDED
Status: DISCONTINUED | OUTPATIENT
Start: 2022-11-05 | End: 2022-11-05 | Stop reason: HOSPADM

## 2022-11-05 RX ORDER — ALBUTEROL SULFATE 2.5 MG/.5ML
2.5 SOLUTION RESPIRATORY (INHALATION) AS NEEDED
Status: DISCONTINUED | OUTPATIENT
Start: 2022-11-05 | End: 2022-11-05 | Stop reason: HOSPADM

## 2022-11-05 RX ORDER — HYDROMORPHONE HYDROCHLORIDE 1 MG/ML
0.5 INJECTION, SOLUTION INTRAMUSCULAR; INTRAVENOUS; SUBCUTANEOUS
Status: DISCONTINUED | OUTPATIENT
Start: 2022-11-05 | End: 2022-11-05 | Stop reason: HOSPADM

## 2022-11-05 RX ORDER — KETOROLAC TROMETHAMINE 30 MG/ML
15 INJECTION, SOLUTION INTRAMUSCULAR; INTRAVENOUS EVERY 6 HOURS
Status: DISCONTINUED | OUTPATIENT
Start: 2022-11-05 | End: 2022-11-06

## 2022-11-05 RX ORDER — ONDANSETRON 2 MG/ML
4 INJECTION INTRAMUSCULAR; INTRAVENOUS
Status: DISCONTINUED | OUTPATIENT
Start: 2022-11-05 | End: 2022-11-10 | Stop reason: HOSPADM

## 2022-11-05 RX ORDER — ONDANSETRON 2 MG/ML
INJECTION INTRAMUSCULAR; INTRAVENOUS AS NEEDED
Status: DISCONTINUED | OUTPATIENT
Start: 2022-11-05 | End: 2022-11-05 | Stop reason: HOSPADM

## 2022-11-05 RX ORDER — SODIUM CHLORIDE 0.9 % (FLUSH) 0.9 %
5-40 SYRINGE (ML) INJECTION EVERY 8 HOURS
Status: DISCONTINUED | OUTPATIENT
Start: 2022-11-05 | End: 2022-11-05 | Stop reason: HOSPADM

## 2022-11-05 RX ORDER — HYDROMORPHONE HYDROCHLORIDE 1 MG/ML
1 INJECTION, SOLUTION INTRAMUSCULAR; INTRAVENOUS; SUBCUTANEOUS
Status: DISCONTINUED | OUTPATIENT
Start: 2022-11-05 | End: 2022-11-08

## 2022-11-05 RX ADMIN — SODIUM CHLORIDE 500 ML: 9 INJECTION, SOLUTION INTRAVENOUS at 07:13

## 2022-11-05 RX ADMIN — FENTANYL CITRATE 50 MCG: 50 INJECTION INTRAMUSCULAR; INTRAVENOUS at 01:20

## 2022-11-05 RX ADMIN — ONDANSETRON 4 MG: 2 INJECTION INTRAMUSCULAR; INTRAVENOUS at 10:48

## 2022-11-05 RX ADMIN — SODIUM CHLORIDE 100 ML/HR: 9 INJECTION, SOLUTION INTRAVENOUS at 22:18

## 2022-11-05 RX ADMIN — HYDROMORPHONE HYDROCHLORIDE 1 MG: 1 INJECTION, SOLUTION INTRAMUSCULAR; INTRAVENOUS; SUBCUTANEOUS at 02:02

## 2022-11-05 RX ADMIN — SODIUM CHLORIDE, PRESERVATIVE FREE 10 ML: 5 INJECTION INTRAVENOUS at 02:48

## 2022-11-05 RX ADMIN — SODIUM CHLORIDE 100 ML/HR: 9 INJECTION, SOLUTION INTRAVENOUS at 02:46

## 2022-11-05 RX ADMIN — ENOXAPARIN SODIUM 40 MG: 100 INJECTION SUBCUTANEOUS at 09:06

## 2022-11-05 RX ADMIN — KETOROLAC TROMETHAMINE 15 MG: 30 INJECTION, SOLUTION INTRAMUSCULAR at 18:32

## 2022-11-05 RX ADMIN — SODIUM CHLORIDE 100 ML/HR: 9 INJECTION, SOLUTION INTRAVENOUS at 10:51

## 2022-11-05 RX ADMIN — FENTANYL CITRATE 50 MCG: 50 INJECTION INTRAMUSCULAR; INTRAVENOUS at 00:57

## 2022-11-05 RX ADMIN — HYDROMORPHONE HYDROCHLORIDE 1 MG: 1 INJECTION, SOLUTION INTRAMUSCULAR; INTRAVENOUS; SUBCUTANEOUS at 10:44

## 2022-11-05 RX ADMIN — SUGAMMADEX 200 MG: 100 INJECTION, SOLUTION INTRAVENOUS at 00:27

## 2022-11-05 RX ADMIN — SODIUM CHLORIDE, PRESERVATIVE FREE 10 ML: 5 INJECTION INTRAVENOUS at 22:16

## 2022-11-05 RX ADMIN — ONDANSETRON 4 MG: 2 INJECTION INTRAMUSCULAR; INTRAVENOUS at 00:22

## 2022-11-05 RX ADMIN — SODIUM CHLORIDE, PRESERVATIVE FREE 40 MG: 5 INJECTION INTRAVENOUS at 09:06

## 2022-11-05 RX ADMIN — HYDROMORPHONE HYDROCHLORIDE 1 MG: 1 INJECTION, SOLUTION INTRAMUSCULAR; INTRAVENOUS; SUBCUTANEOUS at 06:42

## 2022-11-05 RX ADMIN — SODIUM CHLORIDE, PRESERVATIVE FREE 10 ML: 5 INJECTION INTRAVENOUS at 05:52

## 2022-11-05 RX ADMIN — KETOROLAC TROMETHAMINE 15 MG: 30 INJECTION, SOLUTION INTRAMUSCULAR at 11:14

## 2022-11-05 RX ADMIN — FENTANYL CITRATE 50 MCG: 50 INJECTION INTRAMUSCULAR; INTRAVENOUS at 01:05

## 2022-11-05 RX ADMIN — FENTANYL CITRATE 50 MCG: 50 INJECTION INTRAMUSCULAR; INTRAVENOUS at 01:12

## 2022-11-05 NOTE — ANESTHESIA PREPROCEDURE EVALUATION
Relevant Problems   RESPIRATORY SYSTEM   (+) COPD (chronic obstructive pulmonary disease) (HCC)      GASTROINTESTINAL   (+) GERD (gastroesophageal reflux disease)      ENDOCRINE   (+) Hypothyroidism, unspecified       Anesthetic History        Pertinent negatives: No PONV (Patient denies any h/o problems with anesthesia)       Review of Systems / Medical History  Patient summary reviewed, nursing notes reviewed and pertinent labs reviewed    Pulmonary    COPD: mild            Comments: Study Result    Narrative & Impression  EXAM:  XR CHEST PORT     INDICATION: Nasogastric tube placement.     COMPARISON: Chest x-ray 1/4/2011.     TECHNIQUE: Upright portable chest AP view     FINDINGS: Enteric tube traverses expected course to below the diaphragm into the  left upper quadrant. Lower cervical ACDF plate and screws and partial  visualization of lumbar posterior analilia and screw spinal fixation hardware are  noted. Osseous structures are otherwise unremarkable. The cardiac silhouette is  within normal limits. The pulmonary vasculature is within normal limits.      The lungs and pleural spaces are clear.     IMPRESSION  Enteric tube in expected position. 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 rate has decreased BY  36 BPM   Nonspecific T wave abnormality no longer evident in Inferior leads   T wave inversion no longer evident in Anterior leads   QT has shortened   Confirmed by Quynh Hammond MD. ([id number]) on 4/15/2021 4:17:49 AM    GI/Hepatic/Renal     GERD: well controlled          Comments: Severe Abd pain with nausea & vomiting.  Suspected volvulus  Endo/Other      Hypothyroidism: well controlled  Arthritis     Other Findings   Comments: Procedure Information    Case: [id number] Anesthesia Start Date/Time: 11/04/22 2258  Procedure: Laparoscopic Right Gadiel-Colectomy And Other Procedures As Indicated (Right)  Anesthesia type: General  Pre-op diagnosis: No Codes Given  Location: [facility] MAIN OR ADD-ON / [facility] MAIN OR  Surgeons: Arcenio Gaxiola MD      Medical History  Chronic pain  Other ill-defined conditions(799.89)  Hypothyroid  Acoustic neuroma ([address]  Acid reflux  Shingles  Chronic obstructive pulmonary disease (HCC)  Hearing loss in left ear  Stroke ([address]  GERD (gastroesophageal reflux disease)  Arthritis  Cerebral infarct (HCC)  Hyperkalemia  Hypothyroidism, unspecified  H/O fall  Contusion of left wrist  Rib pain  GERD (gastroesophageal reflux disease)  Change in bowel movement  Loose stools  Degeneration of intervertebral disc of lumbar region  Heartburn  Diverticulosis  Diverticulitis  Dysarthria  Intracranial and intraspinal phlebitis and thrombophlebitis  Abdominal pain, left lower quadrant  Monoplegia (HCC)  Nausea & vomiting  Hemorrhoids  Slurred speech  Unspecified lack of coordination  Hemorrhage of rectum  Colon polyps  Hearing loss of left ear  Degenerative lumbar disc  Diarrhea           Physical Exam    Airway  Mallampati: III  TM Distance: 4 - 6 cm  Neck ROM: decreased range of motion, short neck   Mouth opening: Diminished (comment)     Cardiovascular    Rhythm: regular  Rate: normal         Dental  No notable dental hx       Pulmonary  Breath sounds clear to auscultation               Abdominal    Distended    Comments: Study Result    Narrative & Impression  EXAM: CT ABD PELV WO CONT     INDICATION: eval cecal volvulus     COMPARISON: November 2, 2022     IV CONTRAST: None.     ORAL CONTRAST:      Oral contrast was administered for optimal bowel visualization.     TECHNIQUE:   Thin axial images were obtained through the abdomen and pelvis.  Coronal and  sagittal reformats were generated. CT dose reduction was achieved through use of  a standardized protocol tailored for this examination and automatic exposure  control for dose modulation.      The absence of intravenous contrast material reduces the sensitivity for  evaluation of the vasculature and solid organs.     FINDINGS:   LOWER THORAX: Right basilar atelectasis. LIVER: Trace perihepatic ascites. Liver is morphologically normal.  BILIARY TREE: Status post cholecystectomy. CBD is not dilated. SPLEEN: within normal limits. PANCREAS: No focal abnormality. ADRENALS: Unremarkable. KIDNEYS/URETERS: Stable bilateral renal cysts. No hydronephrosis. STOMACH: Nondistended stomach with enteric tube in the stomach. SMALL BOWEL: Prominent distal small bowel loops leading to the ileocecal valve. COLON: The cecum is distended but not volvulized. The ascending, transverse,  descending and sigmoid colon are decompressed. APPENDIX: Nonvisualized  PERITONEUM: Small volume ascites around the liver and in the right lower  quadrant. RETROPERITONEUM: No lymphadenopathy or aortic aneurysm. REPRODUCTIVE ORGANS: Surgically absent uterus. URINARY BLADDER: Nondistended urinary bladder limits evaluation. BONES: Right hip arthroplasty. Posterior lumbar fusion L3-S1. ABDOMINAL WALL: No mass or hernia. ADDITIONAL COMMENTS: N/A     IMPRESSION  Cecal bascule. Prominent distal small bowel extending to the ileocecal valve but  no volvulus. Trace ascites.    Other Findings            Anesthetic Plan    ASA: 3, emergent  Anesthesia type: general    Monitoring Plan: Continuous noninvasive hemodynamic monitoring      Induction: Intravenous  Anesthetic plan and risks discussed with: Patient and Spouse

## 2022-11-05 NOTE — ANESTHESIA POSTPROCEDURE EVALUATION
Procedure(s):  Laparoscopic Right Gadiel-Colectomy. general    Anesthesia Post Evaluation      Multimodal analgesia: multimodal analgesia used between 6 hours prior to anesthesia start to PACU discharge  Patient location during evaluation: PACU  Patient participation: complete - patient participated  Level of consciousness: awake  Pain score: 0  Pain management: adequate  Airway patency: patent  Anesthetic complications: no  Cardiovascular status: acceptable  Respiratory status: acceptable  Hydration status: acceptable  Post anesthesia nausea and vomiting:  controlled  Final Post Anesthesia Temperature Assessment:  Normothermia (36.0-37.5 degrees C)      INITIAL Post-op Vital signs:   Vitals Value Taken Time   /55 11/05/22 0115   Temp 37.2 °C (98.9 °F) 11/05/22 0105   Pulse 86 11/05/22 0122   Resp 10 11/05/22 0122   SpO2 91 % 11/05/22 0122   Vitals shown include unvalidated device data.

## 2022-11-05 NOTE — PROGRESS NOTES
Received pt from PACU via bed. Drowsy, oriented x 4. Lungs clear/diminished ant & lat. Abd lap sites x 3 with band aid intact, umbilicus site with scant sang drainage. Gauze to RLQ & LLQ (KRUNAL) site. C,D & I. KRUNAL draining mod sang drainage. 02 @ 2 L NC in progress. NGT to L nare intact drg yellow drainage. DL PICC RUE drsg intact. LR infusing. Osorio cath insitue drg c/y urine. SCDs & IS initiated. Side rails up x 2, call bell within reach, spouse @ bedside.  Report given by [id number]

## 2022-11-05 NOTE — PROGRESS NOTES
Patient examined this morning. She looks comfortable. She is complaints of back pain. NG tube output is noted. Abdomen soft. Labs are pending    Patient is a postop day #1 for laparoscopic limited right hemicolectomy. Continue n.p.o. NG tube TPN continue postop course patient can be out of bed today.

## 2022-11-05 NOTE — PROGRESS NOTES
Hospitalist Progress Note    Subjective:   Daily Progress Note: 11/5/2022 8:00 AM    Hospital Course: Patient is a [de-identified] female with a history of CVA, diabetes, COPD, hypothyroidism, GERD that presented to the emergency room on 11/2/2022 for nausea, vomiting, abdominal pain for the past 4 days prior. In the ED CT of the abdomen pelvis that showed a cecal volvulus. Vital signs stable. Lab work was significant for leukocytosis. GI consulted and placed NG tube prior to admission. Kept NPO. Repeat CT of the abdomen pelvis on 11/4/2022 shows cecal bascule, prominent distal small bowel extending to the ileocecal valve but no volvulus. Patient went to the OR on 11/4/2022 for laparoscopic limited right hemicolectomy. KRUNAL drain placed postsurgery. NG tube still in place. Subjective: Patient patient says her abdominal pain is an 8 out of 10. Denies any shortness of breath. Mitts to nausea with no vomiting.     Current Facility-Administered Medications   Medication Dose Route Frequency    sodium chloride (NS) flush 5-40 mL  5-40 mL IntraVENous Q8H    ondansetron (ZOFRAN ODT) tablet 4 mg  4 mg Oral Q8H PRN    Or    ondansetron (ZOFRAN) injection 4 mg  4 mg IntraVENous Q6H PRN    0.9% sodium chloride infusion  100 mL/hr IntraVENous CONTINUOUS    enoxaparin (LOVENOX) injection 40 mg  40 mg SubCUTAneous DAILY    HYDROmorphone (DILAUDID) injection 1 mg  1 mg IntraVENous Q4H PRN    sodium chloride 0.9 % bolus infusion 500 mL  500 mL IntraVENous ONCE    budesonide-formoteroL (SYMBICORT) 160-4.5 mcg/actuation HFA inhaler 2 Puff  2 Puff Inhalation BID RT    tiotropium bromide (SPIRIVA RESPIMAT) 2.5 mcg /actuation  2 Puff Inhalation DAILY    pantoprazole (PROTONIX) 40 mg in 0.9% sodium chloride 10 mL injection  40 mg IntraVENous DAILY    albuterol (PROVENTIL HFA, VENTOLIN HFA, PROAIR HFA) inhaler 2 Puff  2 Puff Inhalation Q4H PRN    acetaminophen (TYLENOL) tablet 650 mg  650 mg Oral Q6H PRN    Or    acetaminophen (TYLENOL) suppository 650 mg  650 mg Rectal Q6H PRN    labetaloL (NORMODYNE;TRANDATE) 20 mg/4 mL (5 mg/mL) injection 10 mg  10 mg IntraVENous Q4H PRN    glucose chewable tablet 16 g  4 Tablet Oral PRN    glucagon (GLUCAGEN) injection 1 mg  1 mg IntraMUSCular PRN    dextrose 10% infusion 0-250 mL  0-250 mL IntraVENous PRN    insulin lispro (HUMALOG) injection   SubCUTAneous Q6H        Review of Systems  Constitutional: No fevers, No chills, No sweats, No fatigue, No Weakness  Eyes: No redness  Ears, nose, mouth, throat, and face: No nasal congestion, No sore throat, No voice change  Respiratory: No Shortness of Breath, No cough, No wheezing  Cardiovascular: No chest pain, No palpitations, No extremity edema  Gastrointestinal: + nausea, No vomiting, No diarrhea, + abdominal pain  Genitourinary: No frequency, No dysuria, No hematuria  Integument/breast: No skin lesion(s)   Neurological: No Confusion, No headaches, No dizziness      Objective:     Visit Vitals  BP (!) 150/66 (BP 1 Location: Left upper arm, BP Patient Position: At rest)   Pulse 74   Temp 97.7 °F (36.5 °C)   Resp 16   Ht 5' 2\" (1.575 m)   Wt 63.5 kg (140 lb)   SpO2 96%   BMI 25.61 kg/m²    O2 Flow Rate (L/min): 2 l/min O2 Device: Nasal cannula    Temp (24hrs), Av.1 °F (36.7 °C), Min:97.3 °F (36.3 °C), Max:98.9 °F (37.2 °C)      No intake/output data recorded.  1901 -  0700  In: 1817.5 [I.V.:1817.5]  Out: 1285 [Urine:700; Drains:60]    PHYSICAL EXAM:  Constitutional: No acute distress  Skin: Extremities and face reveal no rashes. HEENT: Sclerae anicteric. Extra-occular muscles are intact. No oral ulcers. The neck is supple and no masses. Cardiovascular: RRR  Respiratory:  Clear breath sounds bilaterally with no wheezes, rales, or rhonchi. GI: Abdomen nondistended, soft, and nontender. NG tube in place  Musculoskeletal: No pitting edema of the lower legs. Able to move all ext  Neurological:  Patient is alert and oriented.  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 COPD without exacerbation  3. Hypertension  4. Type 2 diabetes  5. Hypothyroidism  6. GERD    Plan:   1.  CT of the on pelvis in the emergency room showed signs concerning for cecal volvulus with distention of the cecum and distal small bowel. Kept patient n.p.o.  NG tube in place with minimal output. Repeat CT on 11/4/2022 reviewed. General surgery consulted. Patient is status post limited right hemicolectomy day #1. Defer management to general surgery  2. Oxygen saturation within normal limits on room air. No signs of exacerbation. Continue Symbicort  3. Blood pressure stable. Continue cardiac monitoring. Holding blood pressure medications as she is n.p.o. and blood pressure is stable. Labetalol as needed  4. Hemoglobin A1c is 5.6. On insulin sliding scale. 5.  Patient is normally on 100 mcg of thyroid. Currently holding due to n.p.o.  6. On IV Protonix  7. CBC BMP in a.m. Dispo: >48 hours. Barriers include return of bowel function. Possibly will need min of home health. However will hold off on PT/OT at this time till more appropriate    CODE STATUS full     DVT prophylaxis: Lovenox  Ulcer prophylaxis: [address] discussed with: Patient/Family, Nurse, and     Total time spent with patient: 33 minutes.

## 2022-11-05 NOTE — PROGRESS NOTES
Problem: Pain  Goal: *Control of Pain  Outcome: Progressing Towards Goal     Problem: Patient Education: Go to Patient Education Activity  Goal: Patient/Family Education  Outcome: Progressing Towards Goal     Problem: Falls - Risk of  Goal: *Absence of Falls  Description: Document Julissa Snowden Fall Risk and appropriate interventions in the flowsheet.   Outcome: Progressing Towards Goal  Note: Fall Risk Interventions:            Medication Interventions: Patient to call before getting OOB    Elimination Interventions: Call light in reach

## 2022-11-06 LAB
ANION GAP SERPL CALC-SCNC: 9 MMOL/L (ref 5–15)
BASOPHILS # BLD: 0 K/UL (ref 0–0.1)
BASOPHILS NFR BLD: 0 % (ref 0–1)
BUN SERPL-MCNC: 16 MG/DL (ref 6–20)
BUN/CREAT SERPL: 42 (ref 12–20)
CA-I BLD-MCNC: 8 MG/DL (ref 8.5–10.1)
CHLORIDE SERPL-SCNC: 110 MMOL/L (ref 97–108)
CO2 SERPL-SCNC: 25 MMOL/L (ref 21–32)
CREAT SERPL-MCNC: 0.38 MG/DL (ref 0.55–1.02)
DIFFERENTIAL METHOD BLD: ABNORMAL
EOSINOPHIL # BLD: 0.2 K/UL (ref 0–0.4)
EOSINOPHIL NFR BLD: 2 % (ref 0–7)
ERYTHROCYTE [DISTWIDTH] IN BLOOD BY AUTOMATED COUNT: 12.2 % (ref 11.5–14.5)
GLUCOSE BLD STRIP.AUTO-MCNC: 78 MG/DL (ref 65–100)
GLUCOSE SERPL-MCNC: 70 MG/DL (ref 65–100)
HCT VFR BLD AUTO: 33 % (ref 35–47)
HGB BLD-MCNC: 10.2 G/DL (ref 11.5–16)
IMM GRANULOCYTES # BLD AUTO: 0 K/UL (ref 0–0.04)
IMM GRANULOCYTES NFR BLD AUTO: 0 % (ref 0–0.5)
LYMPHOCYTES # BLD: 1.1 K/UL (ref 0.8–3.5)
LYMPHOCYTES NFR BLD: 13 % (ref 12–49)
MCH RBC QN AUTO: 31.1 PG (ref 26–34)
MCHC RBC AUTO-ENTMCNC: 30.9 G/DL (ref 30–36.5)
MCV RBC AUTO: 100.6 FL (ref 80–99)
MONOCYTES # BLD: 0.7 K/UL (ref 0–1)
MONOCYTES NFR BLD: 8 % (ref 5–13)
NEUTS SEG # BLD: 6.2 K/UL (ref 1.8–8)
NEUTS SEG NFR BLD: 77 % (ref 32–75)
NRBC # BLD: 0 K/UL (ref 0–0.01)
NRBC BLD-RTO: 0 PER 100 WBC
PERFORMED BY, TECHID: NORMAL
PLATELET # BLD AUTO: 198 K/UL (ref 150–400)
PMV BLD AUTO: 11 FL (ref 8.9–12.9)
POTASSIUM SERPL-SCNC: 3.9 MMOL/L (ref 3.5–5.1)
RBC # BLD AUTO: 3.28 M/UL (ref 3.8–5.2)
SODIUM SERPL-SCNC: 144 MMOL/L (ref 136–145)
WBC # BLD AUTO: 8.2 K/UL (ref 3.6–11)

## 2022-11-06 PROCEDURE — 85025 COMPLETE CBC W/AUTO DIFF WBC: CPT

## 2022-11-06 PROCEDURE — 82962 GLUCOSE BLOOD TEST: CPT

## 2022-11-06 PROCEDURE — 65270000029 HC RM PRIVATE

## 2022-11-06 PROCEDURE — 77010033678 HC OXYGEN DAILY

## 2022-11-06 PROCEDURE — 74011000250 HC RX REV CODE- 250: Performed by: SURGERY

## 2022-11-06 PROCEDURE — C9113 INJ PANTOPRAZOLE SODIUM, VIA: HCPCS | Performed by: SURGERY

## 2022-11-06 PROCEDURE — 99024 POSTOP FOLLOW-UP VISIT: CPT | Performed by: SURGERY

## 2022-11-06 PROCEDURE — 74011250636 HC RX REV CODE- 250/636: Performed by: PHYSICIAN ASSISTANT

## 2022-11-06 PROCEDURE — 94640 AIRWAY INHALATION TREATMENT: CPT

## 2022-11-06 PROCEDURE — 74011250636 HC RX REV CODE- 250/636: Performed by: SURGERY

## 2022-11-06 PROCEDURE — 80048 BASIC METABOLIC PNL TOTAL CA: CPT

## 2022-11-06 PROCEDURE — 94762 N-INVAS EAR/PLS OXIMTRY CONT: CPT

## 2022-11-06 PROCEDURE — 36415 COLL VENOUS BLD VENIPUNCTURE: CPT

## 2022-11-06 RX ORDER — KETOROLAC TROMETHAMINE 30 MG/ML
30 INJECTION, SOLUTION INTRAMUSCULAR; INTRAVENOUS
Status: DISPENSED | OUTPATIENT
Start: 2022-11-06 | End: 2022-11-08

## 2022-11-06 RX ADMIN — SODIUM CHLORIDE, PRESERVATIVE FREE 10 ML: 5 INJECTION INTRAVENOUS at 06:20

## 2022-11-06 RX ADMIN — SODIUM CHLORIDE 100 ML/HR: 9 INJECTION, SOLUTION INTRAVENOUS at 16:43

## 2022-11-06 RX ADMIN — KETOROLAC TROMETHAMINE 30 MG: 30 INJECTION, SOLUTION INTRAMUSCULAR; INTRAVENOUS at 16:41

## 2022-11-06 RX ADMIN — KETOROLAC TROMETHAMINE 15 MG: 30 INJECTION, SOLUTION INTRAMUSCULAR at 00:49

## 2022-11-06 RX ADMIN — SODIUM CHLORIDE, PRESERVATIVE FREE 40 MG: 5 INJECTION INTRAVENOUS at 08:15

## 2022-11-06 RX ADMIN — SODIUM CHLORIDE, PRESERVATIVE FREE 10 ML: 5 INJECTION INTRAVENOUS at 22:37

## 2022-11-06 RX ADMIN — SODIUM CHLORIDE, PRESERVATIVE FREE 10 ML: 5 INJECTION INTRAVENOUS at 16:45

## 2022-11-06 RX ADMIN — KETOROLAC TROMETHAMINE 15 MG: 30 INJECTION, SOLUTION INTRAMUSCULAR at 05:42

## 2022-11-06 RX ADMIN — ENOXAPARIN SODIUM 40 MG: 100 INJECTION SUBCUTANEOUS at 08:15

## 2022-11-06 RX ADMIN — SODIUM CHLORIDE 100 ML/HR: 9 INJECTION, SOLUTION INTRAVENOUS at 06:21

## 2022-11-06 NOTE — OP NOTES
This is operative report on Rohith Marquez, patient's YOB: 1940. Date of surgery is: November fifth 2022. Surgeon: Heide Cespedes    Pre-op diagnosis:  Cecal volvulus. Postop diagnosis:  1. 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 Procedure:  1. Laparoscopic limited right hemicolectomy. 2.  Laparoscopic lysis with addition    Anesthesia: General  Anesthesiologist: Dr. Rohith Marquez. Assistant: Verito Martin. EBL: Minimal  Complication: None  Specimen: Cecum, distal ileum and the part of right colon. Implants: None  Fluids and urine output: Please see anesthesia record    Indication for surgery: Ms. Adeline Velasquez is currently hospitalized with a bowel obstruction secondary to cecal volvulus. Patient was initially managed conservatively. Repeat CT scan shows a increased amount of free fluid in abdomen. Patient at this time discussed with the try to attempt laparoscopic right hemicolectomy for cecal volvulus. We discussed rational for the procedure risks benefits complication. Patient has signed surgical consent    Description of procedure: Patient was brought to the operative table comfortable supine position. Followed by general anesthesia was initiated. Followed by abdomen was prepped usual sterile technique using DuraPrep's followed by sterile drapes were placed usual fashion. At this time timeout was called after complaints was made procedure commenced. 1% lidocaine was used for local anesthetic. Small umbilical incision was made for Raúl Jose trocar placement. After Raúl Jose trochars placed safely pneumoperitoneum was insufflated up to 15 iván. Followed by right side colon was examined. Cecum was ischemic and dilated and distended. It appears to be at the base of the cecum there is a adhesive band noted. Followed by the additional 5mm trochars placed suprapubic, left lower quadrant and subxiphoid area.   Lysis of adhesion was performed on the base of the adhesion scar tissues. Followed by pressure was relieved at the cecum. Followed by cecum and distal ileum and right colon was mobilized by detaching the retroperitoneal attachments using harmonic scalpel's. After the right colon was adequately mobilized. Small incision was made right mid abdomen and the cecum and right colon was delivered extracorporeally. Terminal ileum and distal colon was divided with a DARIANA staple devices, followed by terminal ileum was anastomosed to distal right colon with a hand sewn 2 layer anastomosis with inner mucosa with a 3-0 Vicryl sutures and outer serosal layer with a 3-0 silk sutures. Mesenteric defect was closed with 3-0 Vicryl sutures. After anastomosis complete colon was delivered intracorporeally. Followed by fascial defect was closed with 2 layers using 0 Vicryl sutures. Followed by pneumoperitoneum was reinsufflated, small bowel was returned to normal anatomical position. Omentum was draped over the anastomosis site. And the drains were placed. Followed by rest of trochars removed under direct visualization's, and pneumoperitoneum was deflated, and the umbilical port site was closed with 0 Vicryl sutures and rest of the skin was closed with 4-0 Monocryl sutures. Appropriate sterile dressing was applied. Patient tolerated procedure well without any complications patient was awakened and extubated in the OR transferred recovery stable condition. At the end the procedure instrument count sponge counts were correct.

## 2022-11-06 NOTE — PROGRESS NOTES
Hospitalist Progress Note    Subjective:   Daily Progress Note: 11/6/2022 8:00 AM    Hospital Course: Patient is a 53-year-old female with a history of CVA, diabetes, COPD, hypothyroidism, GERD that presented to the emergency room on 11/2/2022 for nausea, vomiting, abdominal pain for the past 4 days prior. In the ED CT of the abdomen pelvis that showed a cecal volvulus. Vital signs stable. Lab work was significant for leukocytosis. GI consulted and placed NG tube prior to admission. Kept NPO. Repeat CT of the abdomen pelvis on 11/4/2022 shows cecal bascule, prominent distal small bowel extending to the ileocecal valve but no volvulus. Patient went to the OR on 11/4/2022 for laparoscopic limited right hemicolectomy. KRUNAL drain placed postsurgery. NG tube still in place. Subjective:  Overall patient is feeling better.  in the room during exam. Some abdominal pain. No vomiting.  No BM or flatus    Current Facility-Administered Medications   Medication Dose Route Frequency    ketorolac (TORADOL) injection 30 mg  30 mg IntraVENous Q6H PRN    sodium chloride (NS) flush 5-40 mL  5-40 mL IntraVENous Q8H    ondansetron (ZOFRAN ODT) tablet 4 mg  4 mg Oral Q8H PRN    Or    ondansetron (ZOFRAN) injection 4 mg  4 mg IntraVENous Q6H PRN    0.9% sodium chloride infusion  100 mL/hr IntraVENous CONTINUOUS    enoxaparin (LOVENOX) injection 40 mg  40 mg SubCUTAneous DAILY    HYDROmorphone (DILAUDID) injection 1 mg  1 mg IntraVENous Q4H PRN    fluticasone-umeclidinium-vilanterol (TRELEGY ELLIPTA) inhaler 1 Puff (Patient Supplied)  1 Puff Inhalation DAILY    pantoprazole (PROTONIX) 40 mg in 0.9% sodium chloride 10 mL injection  40 mg IntraVENous DAILY    albuterol (PROVENTIL HFA, VENTOLIN HFA, PROAIR HFA) inhaler 2 Puff  2 Puff Inhalation Q4H PRN    acetaminophen (TYLENOL) tablet 650 mg  650 mg Oral Q6H PRN    Or    acetaminophen (TYLENOL) suppository 650 mg  650 mg Rectal Q6H PRN    labetaloL (NORMODYNE;TRANDATE) 20 mg/4 mL (5 mg/mL) injection 10 mg  10 mg IntraVENous Q4H PRN    glucose chewable tablet 16 g  4 Tablet Oral PRN    glucagon (GLUCAGEN) injection 1 mg  1 mg IntraMUSCular PRN    dextrose 10% infusion 0-250 mL  0-250 mL IntraVENous PRN    insulin lispro (HUMALOG) injection   SubCUTAneous Q6H        Review of Systems  Constitutional: No fevers, No chills, No sweats, No fatigue, No Weakness  Eyes: No redness  Ears, nose, mouth, throat, and face: No nasal congestion, No sore throat, No voice change  Respiratory: No Shortness of Breath, No cough, No wheezing  Cardiovascular: No chest pain, No palpitations, No extremity edema  Gastrointestinal: + nausea, No vomiting, No diarrhea, + abdominal pain  Genitourinary: No frequency, No dysuria, No hematuria  Integument/breast: No skin lesion(s)   Neurological: No Confusion, No headaches, No dizziness      Objective:     Visit Vitals  BP (!) 130/54 (BP 1 Location: Left upper arm, BP Patient Position: At rest)   Pulse 72   Temp 98.1 °F (36.7 °C)   Resp 16   Ht 5' 2\" (1.575 m)   Wt 63.5 kg (140 lb)   SpO2 98%   BMI 25.61 kg/m²    O2 Flow Rate (L/min): 2 l/min O2 Device: Nasal cannula    Temp (24hrs), Av °F (36.7 °C), Min:97.4 °F (36.3 °C), Max:98.2 °F (36.8 °C)      No intake/output data recorded.  1901 -  0700  In: 2075.8 [I.V.:2075.8]  Out: 1239 [Urine:900; Drains:200]    PHYSICAL EXAM:  Constitutional: No acute distress  Skin: Extremities and face reveal no rashes. HEENT: Sclerae anicteric. Extra-occular muscles are intact. No oral ulcers. The neck is supple and no masses. Cardiovascular: RRR  Respiratory:  Clear breath sounds bilaterally with no wheezes, rales, or rhonchi. GI: Abdomen nondistended, soft, and nontender. NG tube in place, hypoactive bowel sounds  Musculoskeletal: No pitting edema of the lower legs. Able to move all ext  Neurological:  Patient is alert and oriented.  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 COPD without exacerbation  3. Hypertension  4. Type 2 diabetes  5. Hypothyroidism  6. GERD    Plan:   1.  CT of the on pelvis in the emergency room showed signs concerning for cecal volvulus with distention of the cecum and distal small bowel. Kept patient n.p.o.  NG tube in place with minimal output. Repeat CT on 11/4/2022 reviewed. General surgery consulted. Patient is status post limited right hemicolectomy day #2. Defer management to general surgery  2. Oxygen saturation within normal limits on room air. No signs of exacerbation. Continue Symbicort  3. Blood pressure stable. Continue cardiac monitoring. Holding blood pressure medications as she is n.p.o. and blood pressure is stable. Labetalol as needed  4. Hemoglobin A1c is 5.6. On insulin sliding scale. 5.  Patient is normally on 100 mcg of thyroid. Currently holding due to n.p.o.  6. On IV Protonix  7. CBC BMP in a.m. Dispo: >48 hours. Barriers include return of bowel function. Possibly will need min of home health. However will hold off on PT/OT at this time till more appropriate    CODE STATUS full     DVT prophylaxis: Lovenox  Ulcer prophylaxis: [address] discussed with: Patient/Family, Nurse, and     Total time spent with patient: 34 minutes.

## 2022-11-06 NOTE — PROGRESS NOTES
Patient examined this morning    Patient appears comfortable other than moderate pain on the right lower quadrant area. Wounds are clean KRUNAL drain output minimal      Postop day #2 laparoscopic limited right hemicolectomy with strangulated and ischemic cecal bascule. Will continue n.p.o. NG tube suctioning and bowel rest until GI function normalizes. Patient is to be out of bed today ambulate. Continue postop course.

## 2022-11-06 NOTE — PROGRESS NOTES
Problem: Pain  Goal: *Control of Pain  Outcome: Progressing Towards Goal     Problem: Patient Education: Go to Patient Education Activity  Goal: Patient/Family Education  Outcome: Progressing Towards Goal     Problem: Falls - Risk of  Goal: *Absence of Falls  Description: Document Sofia Fothergill Fall Risk and appropriate interventions in the flowsheet.   Outcome: Progressing Towards Goal  Note: Fall Risk Interventions:  Mobility Interventions: Bed/chair exit alarm         Medication Interventions: Bed/chair exit alarm    Elimination Interventions: Bed/chair exit alarm

## 2022-11-07 ENCOUNTER — APPOINTMENT (OUTPATIENT)
Dept: CT IMAGING | Age: 82
DRG: 329 | End: 2022-11-07
Attending: SURGERY
Payer: MEDICARE

## 2022-11-07 LAB
ANION GAP SERPL CALC-SCNC: 11 MMOL/L (ref 5–15)
BASOPHILS # BLD: 0 K/UL (ref 0–0.1)
BASOPHILS NFR BLD: 0 % (ref 0–1)
BUN SERPL-MCNC: 17 MG/DL (ref 6–20)
BUN/CREAT SERPL: 39 (ref 12–20)
CA-I BLD-MCNC: 8.4 MG/DL (ref 8.5–10.1)
CHLORIDE SERPL-SCNC: 110 MMOL/L (ref 97–108)
CO2 SERPL-SCNC: 22 MMOL/L (ref 21–32)
CREAT SERPL-MCNC: 0.44 MG/DL (ref 0.55–1.02)
DIFFERENTIAL METHOD BLD: ABNORMAL
EOSINOPHIL # BLD: 0.3 K/UL (ref 0–0.4)
EOSINOPHIL NFR BLD: 3 % (ref 0–7)
ERYTHROCYTE [DISTWIDTH] IN BLOOD BY AUTOMATED COUNT: 12.1 % (ref 11.5–14.5)
GLUCOSE BLD STRIP.AUTO-MCNC: 67 MG/DL (ref 65–100)
GLUCOSE BLD STRIP.AUTO-MCNC: 70 MG/DL (ref 65–100)
GLUCOSE BLD STRIP.AUTO-MCNC: 71 MG/DL (ref 65–100)
GLUCOSE BLD STRIP.AUTO-MCNC: 72 MG/DL (ref 65–100)
GLUCOSE BLD STRIP.AUTO-MCNC: 73 MG/DL (ref 65–100)
GLUCOSE BLD STRIP.AUTO-MCNC: 77 MG/DL (ref 65–100)
GLUCOSE BLD STRIP.AUTO-MCNC: 87 MG/DL (ref 65–100)
GLUCOSE SERPL-MCNC: 74 MG/DL (ref 65–100)
HCT VFR BLD AUTO: 32.7 % (ref 35–47)
HGB BLD-MCNC: 10.3 G/DL (ref 11.5–16)
IMM GRANULOCYTES # BLD AUTO: 0.1 K/UL (ref 0–0.04)
IMM GRANULOCYTES NFR BLD AUTO: 1 % (ref 0–0.5)
LYMPHOCYTES # BLD: 1.6 K/UL (ref 0.8–3.5)
LYMPHOCYTES NFR BLD: 17 % (ref 12–49)
MCH RBC QN AUTO: 31.4 PG (ref 26–34)
MCHC RBC AUTO-ENTMCNC: 31.5 G/DL (ref 30–36.5)
MCV RBC AUTO: 99.7 FL (ref 80–99)
MONOCYTES # BLD: 0.7 K/UL (ref 0–1)
MONOCYTES NFR BLD: 7 % (ref 5–13)
NEUTS SEG # BLD: 6.9 K/UL (ref 1.8–8)
NEUTS SEG NFR BLD: 72 % (ref 32–75)
NRBC # BLD: 0 K/UL (ref 0–0.01)
NRBC BLD-RTO: 0 PER 100 WBC
PERFORMED BY, TECHID: NORMAL
PLATELET # BLD AUTO: 247 K/UL (ref 150–400)
PMV BLD AUTO: 11.1 FL (ref 8.9–12.9)
POTASSIUM SERPL-SCNC: 3.7 MMOL/L (ref 3.5–5.1)
RBC # BLD AUTO: 3.28 M/UL (ref 3.8–5.2)
SODIUM SERPL-SCNC: 143 MMOL/L (ref 136–145)
WBC # BLD AUTO: 9.6 K/UL (ref 3.6–11)

## 2022-11-07 PROCEDURE — 74011000636 HC RX REV CODE- 636: Performed by: INTERNAL MEDICINE

## 2022-11-07 PROCEDURE — C9113 INJ PANTOPRAZOLE SODIUM, VIA: HCPCS | Performed by: SURGERY

## 2022-11-07 PROCEDURE — 74011000250 HC RX REV CODE- 250: Performed by: SURGERY

## 2022-11-07 PROCEDURE — 80048 BASIC METABOLIC PNL TOTAL CA: CPT

## 2022-11-07 PROCEDURE — 85025 COMPLETE CBC W/AUTO DIFF WBC: CPT

## 2022-11-07 PROCEDURE — 74011250636 HC RX REV CODE- 250/636: Performed by: SURGERY

## 2022-11-07 PROCEDURE — 94762 N-INVAS EAR/PLS OXIMTRY CONT: CPT

## 2022-11-07 PROCEDURE — 82962 GLUCOSE BLOOD TEST: CPT

## 2022-11-07 PROCEDURE — 94640 AIRWAY INHALATION TREATMENT: CPT

## 2022-11-07 PROCEDURE — 74176 CT ABD & PELVIS W/O CONTRAST: CPT

## 2022-11-07 PROCEDURE — 36415 COLL VENOUS BLD VENIPUNCTURE: CPT

## 2022-11-07 PROCEDURE — 65270000029 HC RM PRIVATE

## 2022-11-07 RX ORDER — LEVOTHYROXINE SODIUM 100 UG/1
100 TABLET ORAL
Status: DISCONTINUED | OUTPATIENT
Start: 2022-11-08 | End: 2022-11-10 | Stop reason: HOSPADM

## 2022-11-07 RX ADMIN — SODIUM CHLORIDE, PRESERVATIVE FREE 10 ML: 5 INJECTION INTRAVENOUS at 05:40

## 2022-11-07 RX ADMIN — KETOROLAC TROMETHAMINE 30 MG: 30 INJECTION, SOLUTION INTRAMUSCULAR; INTRAVENOUS at 15:39

## 2022-11-07 RX ADMIN — SODIUM CHLORIDE, PRESERVATIVE FREE 40 MG: 5 INJECTION INTRAVENOUS at 10:22

## 2022-11-07 RX ADMIN — ENOXAPARIN SODIUM 40 MG: 100 INJECTION SUBCUTANEOUS at 10:22

## 2022-11-07 RX ADMIN — SODIUM CHLORIDE 100 ML/HR: 9 INJECTION, SOLUTION INTRAVENOUS at 03:33

## 2022-11-07 RX ADMIN — LABETALOL HYDROCHLORIDE 10 MG: 5 INJECTION, SOLUTION INTRAVENOUS at 21:54

## 2022-11-07 RX ADMIN — SODIUM CHLORIDE, PRESERVATIVE FREE 10 ML: 5 INJECTION INTRAVENOUS at 14:00

## 2022-11-07 RX ADMIN — KETOROLAC TROMETHAMINE 30 MG: 30 INJECTION, SOLUTION INTRAMUSCULAR; INTRAVENOUS at 21:54

## 2022-11-07 RX ADMIN — HYDROMORPHONE HYDROCHLORIDE 1 MG: 1 INJECTION, SOLUTION INTRAMUSCULAR; INTRAVENOUS; SUBCUTANEOUS at 03:46

## 2022-11-07 RX ADMIN — DIATRIZOATE MEGLUMINE AND DIATRIZOATE SODIUM 30 ML: 660; 100 LIQUID ORAL; RECTAL at 17:30

## 2022-11-07 RX ADMIN — KETOROLAC TROMETHAMINE 30 MG: 30 INJECTION, SOLUTION INTRAMUSCULAR; INTRAVENOUS at 00:08

## 2022-11-07 NOTE — PROGRESS NOTES
CM reviewed chart. Patient scheduled for imaging exam today to determine need for NG tube. Once medically stable, plans are for discharge home self care.

## 2022-11-07 NOTE — PROGRESS NOTES
Hospitalist Progress Note    Subjective:   Daily Progress Note: 11/7/2022 2:29 PM    Hospital Course:  PT admitted for complaints of nausea/vomiting, CT of abdomen/pelvis showing cecal bascule prominent distal small bowel extending to the ileocecal valve no volvulus. She had NGT placed and underwent a laparoscopic limited right hemicolectomy. Subjective: Pt seen in room, sitting in chair.  present at bedside. Current Facility-Administered Medications   Medication Dose Route Frequency    ketorolac (TORADOL) injection 30 mg  30 mg IntraVENous Q6H PRN    sodium chloride (NS) flush 5-40 mL  5-40 mL IntraVENous Q8H    ondansetron (ZOFRAN ODT) tablet 4 mg  4 mg Oral Q8H PRN    Or    ondansetron (ZOFRAN) injection 4 mg  4 mg IntraVENous Q6H PRN    0.9% sodium chloride infusion  100 mL/hr IntraVENous CONTINUOUS    enoxaparin (LOVENOX) injection 40 mg  40 mg SubCUTAneous DAILY    HYDROmorphone (DILAUDID) injection 1 mg  1 mg IntraVENous Q4H PRN    fluticasone-umeclidinium-vilanterol (TRELEGY ELLIPTA) inhaler 1 Puff (Patient Supplied)  1 Puff Inhalation DAILY    pantoprazole (PROTONIX) 40 mg in 0.9% sodium chloride 10 mL injection  40 mg IntraVENous DAILY    albuterol (PROVENTIL HFA, VENTOLIN HFA, PROAIR HFA) inhaler 2 Puff  2 Puff Inhalation Q4H PRN    acetaminophen (TYLENOL) tablet 650 mg  650 mg Oral Q6H PRN    Or    acetaminophen (TYLENOL) suppository 650 mg  650 mg Rectal Q6H PRN    labetaloL (NORMODYNE;TRANDATE) 20 mg/4 mL (5 mg/mL) injection 10 mg  10 mg IntraVENous Q4H PRN    glucose chewable tablet 16 g  4 Tablet Oral PRN    glucagon (GLUCAGEN) injection 1 mg  1 mg IntraMUSCular PRN    dextrose 10% infusion 0-250 mL  0-250 mL IntraVENous PRN    insulin lispro (HUMALOG) injection   SubCUTAneous Q6H        Review of Systems:    Review of Systems   Constitutional:  Negative for chills and fever. Respiratory:  Negative for cough and sputum production.     Cardiovascular:  Negative for chest pain and palpitations. Gastrointestinal:  Negative for abdominal pain, heartburn, nausea and vomiting. Genitourinary:  Negative for dysuria and urgency. Objective:     Visit Vitals  BP (!) 140/58 (BP 1 Location: Left upper arm, BP Patient Position: At rest)   Pulse 64   Temp 97.6 °F (36.4 °C)   Resp 17   Ht 5' 2\" (1.575 m)   Wt 63.5 kg (140 lb)   SpO2 93%   BMI 25.61 kg/m²    O2 Flow Rate (L/min): 2 l/min O2 Device: None (Room air)    Temp (24hrs), Av.9 °F (36.6 °C), Min:97.6 °F (36.4 °C), Max:98.3 °F (36.8 °C)      701 - 1900  In: -   Out: 55 [Drains:55]  1901 -  07  In: 2400 [I.V.:2400]  Out: 2900 [Urine:1400; Drains:150]    PHYSICAL EXAM:    Physical Exam  Constitutional:       General: She is not in acute distress. Cardiovascular:      Rate and Rhythm: Normal rate and regular rhythm. Pulses: Normal pulses. Heart sounds: Normal heart sounds. Pulmonary:      Effort: Pulmonary effort is normal.      Breath sounds: Normal breath sounds. Abdominal:      General: There is distension. Palpations: Abdomen is soft. Comments: Hypo bowel sounds, NGT clamped   Musculoskeletal:         General: Normal range of motion. Skin:     General: Skin is warm and dry. Neurological:      Mental Status: She is oriented to person, place, and time.           Data Review    Recent Results (from the past 24 hour(s))   GLUCOSE, POC    Collection Time: 22 12:10 AM   Result Value Ref Range    Glucose (POC) 73 65 - 100 mg/dL    Performed by Karen Doing    GLUCOSE, POC    Collection Time: 22  5:39 AM   Result Value Ref Range    Glucose (POC) 77 65 - 100 mg/dL    Performed by Karen Doing    METABOLIC PANEL, BASIC    Collection Time: 22  6:16 AM   Result Value Ref Range    Sodium 143 136 - 145 mmol/L    Potassium 3.7 3.5 - 5.1 mmol/L    Chloride 110 (H) 97 - 108 mmol/L    CO2 22 21 - 32 mmol/L    Anion gap 11 5 - 15 mmol/L    Glucose 74 65 - 100 mg/dL    BUN 17 6 - 20 mg/dL    Creatinine 0.44 (L) 0.55 - 1.02 mg/dL    BUN/Creatinine ratio 39 (H) 12 - 20      eGFR >60 >60 ml/min/1.73m2    Calcium 8.4 (L) 8.5 - 10.1 mg/dL   CBC WITH AUTOMATED DIFF    Collection Time: 11/07/22  6:16 AM   Result Value Ref Range    WBC 9.6 3.6 - 11.0 K/uL    RBC 3.28 (L) 3.80 - 5.20 M/uL    HGB 10.3 (L) 11.5 - 16.0 g/dL    HCT 32.7 (L) 35.0 - 47.0 %    MCV 99.7 (H) 80.0 - 99.0 FL    MCH 31.4 26.0 - 34.0 PG    MCHC 31.5 30.0 - 36.5 g/dL    RDW 12.1 11.5 - 14.5 %    PLATELET 770 133 - 415 K/uL    MPV 11.1 8.9 - 12.9 FL    NRBC 0.0 0.0  WBC    ABSOLUTE NRBC 0.00 0.00 - 0.01 K/uL    NEUTROPHILS 72 32 - 75 %    LYMPHOCYTES 17 12 - 49 %    MONOCYTES 7 5 - 13 %    EOSINOPHILS 3 0 - 7 %    BASOPHILS 0 0 - 1 %    IMMATURE GRANULOCYTES 1 (H) 0 - 0.5 %    ABS. NEUTROPHILS 6.9 1.8 - 8.0 K/UL    ABS. LYMPHOCYTES 1.6 0.8 - 3.5 K/UL    ABS. MONOCYTES 0.7 0.0 - 1.0 K/UL    ABS. EOSINOPHILS 0.3 0.0 - 0.4 K/UL    ABS. BASOPHILS 0.0 0.0 - 0.1 K/UL    ABS. IMM. GRANS. 0.1 (H) 0.00 - 0.04 K/UL    DF AUTOMATED     GLUCOSE, POC    Collection Time: 11/07/22 12:34 PM   Result Value Ref Range    Glucose (POC) 72 65 - 100 mg/dL    Performed by Anna Biswas        CT ABD PELV WO CONT   Final Result   Cecal bascule. Prominent distal small bowel extending to the ileocecal valve but   no volvulus. Trace ascites. XR CHEST PORT   Final Result   Enteric tube in expected position. CT ABD PELV WO CONT    (Results Pending)       Active Problems:    Volvulus of descending colon ([address] (11/3/2022)      Assessment/Plan:   Cecal bascule- s/p right limited hemicolectomy POD #3, NGT clamped, CT abdomen/pelvis w/ IV contrast pending. 2. DM II- BS acceptable range, s/s insulin coverage    3. Hypertension- controlled, home BP held    4. GERD- IV PPI    5.  Hypothyroidism- on levothyroxine, will re-order, give when able to take PO         DVT prophylaxis- lovenox  Code Status:  Full Code  POA: NOK  [place] [phone]    Care Plan discussed with:   __________patient, staff nurse, CM, general surgery_____________________________________________________    Leona Viramontes NP

## 2022-11-07 NOTE — PROGRESS NOTES
Patient examined this morning. She looks well. No distress other than from NG tube    Patient had a multiple bowel movement. Abdomen soft. Drain output is minimal      We will get CT scan abdomen pelvis with oral contrast to evaluate laparoscopic right hemicolectomy. If the CT scan looks okay we will remove NG tube.

## 2022-11-07 NOTE — PROGRESS NOTES
Problem: Pain  Goal: *Control of Pain  Outcome: Progressing Towards Goal     Problem: Patient Education: Go to Patient Education Activity  Goal: Patient/Family Education  Outcome: Progressing Towards Goal     Problem: Falls - Risk of  Goal: *Absence of Falls  Description: Document Olivier Tobar Fall Risk and appropriate interventions in the flowsheet.   Outcome: Progressing Towards Goal  Note: Fall Risk Interventions:  Mobility Interventions: Patient to call before getting OOB         Medication Interventions: Bed/chair exit alarm    Elimination Interventions: Bed/chair exit alarm

## 2022-11-08 LAB
BACTERIA SPEC CULT: ABNORMAL
COLONY COUNT,CNT: ABNORMAL
GLUCOSE BLD STRIP.AUTO-MCNC: 111 MG/DL (ref 65–100)
PERFORMED BY, TECHID: ABNORMAL
SPECIAL REQUESTS,SREQ: ABNORMAL

## 2022-11-08 PROCEDURE — 65270000029 HC RM PRIVATE

## 2022-11-08 PROCEDURE — 74011250636 HC RX REV CODE- 250/636: Performed by: SURGERY

## 2022-11-08 PROCEDURE — 94640 AIRWAY INHALATION TREATMENT: CPT

## 2022-11-08 PROCEDURE — 74011000250 HC RX REV CODE- 250: Performed by: SURGERY

## 2022-11-08 PROCEDURE — 94762 N-INVAS EAR/PLS OXIMTRY CONT: CPT

## 2022-11-08 PROCEDURE — 82962 GLUCOSE BLOOD TEST: CPT

## 2022-11-08 PROCEDURE — 74011250637 HC RX REV CODE- 250/637: Performed by: NURSE PRACTITIONER

## 2022-11-08 PROCEDURE — C9113 INJ PANTOPRAZOLE SODIUM, VIA: HCPCS | Performed by: SURGERY

## 2022-11-08 PROCEDURE — 74011250637 HC RX REV CODE- 250/637: Performed by: SURGERY

## 2022-11-08 RX ORDER — CIPROFLOXACIN 500 MG/1
500 TABLET ORAL EVERY 12 HOURS
Status: DISCONTINUED | OUTPATIENT
Start: 2022-11-08 | End: 2022-11-10 | Stop reason: HOSPADM

## 2022-11-08 RX ORDER — PANTOPRAZOLE SODIUM 20 MG/1
20 TABLET, DELAYED RELEASE ORAL
Status: DISCONTINUED | OUTPATIENT
Start: 2022-11-09 | End: 2022-11-10 | Stop reason: HOSPADM

## 2022-11-08 RX ADMIN — SODIUM CHLORIDE, PRESERVATIVE FREE 40 MG: 5 INJECTION INTRAVENOUS at 08:29

## 2022-11-08 RX ADMIN — CIPROFLOXACIN 500 MG: 500 TABLET, FILM COATED ORAL at 20:24

## 2022-11-08 RX ADMIN — ENOXAPARIN SODIUM 40 MG: 100 INJECTION SUBCUTANEOUS at 08:31

## 2022-11-08 RX ADMIN — CIPROFLOXACIN 500 MG: 500 TABLET, FILM COATED ORAL at 14:39

## 2022-11-08 RX ADMIN — ONDANSETRON 4 MG: 2 INJECTION INTRAMUSCULAR; INTRAVENOUS at 00:57

## 2022-11-08 RX ADMIN — ACETAMINOPHEN 650 MG: 325 TABLET ORAL at 20:24

## 2022-11-08 RX ADMIN — LABETALOL HYDROCHLORIDE 10 MG: 5 INJECTION, SOLUTION INTRAVENOUS at 20:23

## 2022-11-08 RX ADMIN — ACETAMINOPHEN 650 MG: 325 TABLET ORAL at 14:39

## 2022-11-08 NOTE — PROGRESS NOTES
Problem: Pain  Goal: *Control of Pain  Outcome: Progressing Towards Goal     Problem: Falls - Risk of  Goal: *Absence of Falls  Description: Document Kamilah Fall Risk and appropriate interventions in the flowsheet.   Outcome: Progressing Towards Goal  Note: Fall Risk Interventions:  Mobility Interventions: Bed/chair exit alarm, OT consult for ADLs, Patient to call before getting OOB, PT Consult for mobility concerns         Medication Interventions: Bed/chair exit alarm    Elimination Interventions: Bed/chair exit alarm, Call light in reach

## 2022-11-08 NOTE — PROGRESS NOTES
Hospitalist Progress Note    Subjective:   Daily Progress Note: 11/8/2022 2:38 PM    Hospital Course:  PT admitted for complaints of nausea/vomiting, CT of abdomen/pelvis showing cecal bascule prominent distal small bowel extending to the ileocecal valve no volvulus. She had NGT placed and underwent a laparoscopic limited right hemicolectomy. Repeat CT abdomen/pelvis showing mild terminal ileal wall thickening to the anastomosis, small bowel dilation possible ileus. NGT removed, pt had stool and advanced diet to clear liquids. Subjective: Pt seen in room, tolerating PO diet without difficulty, no abdominal pain. Current Facility-Administered Medications   Medication Dose Route Frequency    ciprofloxacin HCl (CIPRO) tablet 500 mg  500 mg Oral Q12H    [START ON 11/9/2022] pantoprazole (PROTONIX) tablet 20 mg  20 mg Oral ACB    levothyroxine (SYNTHROID) tablet 100 mcg  100 mcg Oral 6am    ondansetron (ZOFRAN ODT) tablet 4 mg  4 mg Oral Q8H PRN    Or    ondansetron (ZOFRAN) injection 4 mg  4 mg IntraVENous Q6H PRN    enoxaparin (LOVENOX) injection 40 mg  40 mg SubCUTAneous DAILY    fluticasone-umeclidinium-vilanterol (TRELEGY ELLIPTA) inhaler 1 Puff (Patient Supplied)  1 Puff Inhalation DAILY    albuterol (PROVENTIL HFA, VENTOLIN HFA, PROAIR HFA) inhaler 2 Puff  2 Puff Inhalation Q4H PRN    acetaminophen (TYLENOL) tablet 650 mg  650 mg Oral Q6H PRN    Or    acetaminophen (TYLENOL) suppository 650 mg  650 mg Rectal Q6H PRN    labetaloL (NORMODYNE;TRANDATE) 20 mg/4 mL (5 mg/mL) injection 10 mg  10 mg IntraVENous Q4H PRN    glucose chewable tablet 16 g  4 Tablet Oral PRN    glucagon (GLUCAGEN) injection 1 mg  1 mg IntraMUSCular PRN    dextrose 10% infusion 0-250 mL  0-250 mL IntraVENous PRN    insulin lispro (HUMALOG) injection   SubCUTAneous Q6H        Review of Systems:    Review of Systems   Constitutional:  Negative for chills and fever. Respiratory:  Negative for cough and shortness of breath. Cardiovascular:  Negative for chest pain and palpitations. Gastrointestinal:  Negative for heartburn, nausea and vomiting. Genitourinary:  Negative for dysuria and urgency. Objective:     Visit Vitals  BP (!) 170/69 (BP 1 Location: Left arm) Comment: RN ntfd/Instructor ntfd   Pulse 75   Temp 98.4 °F (36.9 °C)   Resp 18   Ht 5' 2\" (1.575 m)   Wt 63.5 kg (140 lb)   SpO2 95%   BMI 25.61 kg/m²    O2 Flow Rate (L/min): 2 l/min O2 Device: None (Room air)    Temp (24hrs), Av.3 °F (36.8 °C), Min:97.6 °F (36.4 °C), Max:99.1 °F (37.3 °C)      701 - 1900  In: -   Out: 180 [Drains:180]  1901 - 700  In: 5862 [I.V.:2995]  Out: 239 [Urine:450; Drains:85]    PHYSICAL EXAM:    Physical Exam  Constitutional:       General: She is not in acute distress. Cardiovascular:      Rate and Rhythm: Normal rate and regular rhythm. Pulses: Normal pulses. Heart sounds: Normal heart sounds. Pulmonary:      Effort: Pulmonary effort is normal.      Breath sounds: Normal breath sounds. Abdominal:      General: Bowel sounds are normal.      Palpations: Abdomen is soft. Comments: KRUNAL to abdomen, serous drainage   Neurological:      Mental Status: She is oriented to person, place, and time.    Psychiatric:         Mood and Affect: Mood normal.         Behavior: Behavior normal.          Data Review    Recent Results (from the past 24 hour(s))   GLUCOSE, POC    Collection Time: 22  2:55 PM   Result Value Ref Range    Glucose (POC) 71 65 - 100 mg/dL    Performed by Mark Solis, POC    Collection Time: 22  5:22 PM   Result Value Ref Range    Glucose (POC) 67 65 - 100 mg/dL    Performed by Yoselyn Krishnamurthy, POC    Collection Time: 22  6:43 PM   Result Value Ref Range    Glucose (POC) 70 65 - 100 mg/dL    Performed by Joe Ochoa    GLUCOSE, POC    Collection Time: 22 11:43 PM   Result Value Ref Range    Glucose (POC) 87 65 - 100 mg/dL    Performed by Cheng Mantilla    GLUCOSE, POC    Collection Time: 11/08/22 12:04 PM   Result Value Ref Range    Glucose (POC) 111 (H) 65 - 100 mg/dL    Performed by Janak Do        CT ABD PELV WO CONT   Final Result   Postsurgical appearance suggestive of ileus cystectomy with mild   terminal ileal wall thickening to the anastomosis which may reflect   postoperative changes with mild upstream small bowel dilation likely reflecting   ileus. Incidentals as above including probable postoperative related small   pleural effusions and broad body wall edema. CT ABD PELV WO CONT   Final Result   Cecal bascule. Prominent distal small bowel extending to the ileocecal valve but   no volvulus. Trace ascites. XR CHEST PORT   Final Result   Enteric tube in expected position. 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  CT abdomen/pelvis mild terminal ileal wall thickening to the anastomosis, small bowel dilation possible ileus. Advanced diet. 2. DM II- BS less than 150, d/c insulin s/s. 3. Hypertension- not on medication, will need to follow up with PCP after discharge for management. 4. GERD- PPI     5. Hypothyroidism- on levothyroxine. 6. Discharge- possible next 24 hrs depending on clinical course.                DVT Prophylaxis: lovenox  Code Status:  Full Code  POA: NOK husband Tatiana Jeans     Care Plan discussed with:   ________patient, staff nurse, CM, general surgery_______________________________________________________    Murvin Peabody, NP

## 2022-11-08 NOTE — PROGRESS NOTES
Bedside shift change report given to Singh Pang (oncoming nurse) by Evaristo Rogers (offgoing nurse). Report included the following information SBAR, Kardex, Intake/Output, MAR, and Recent Results.

## 2022-11-09 PROCEDURE — 74011250637 HC RX REV CODE- 250/637: Performed by: SURGERY

## 2022-11-09 PROCEDURE — 65270000029 HC RM PRIVATE

## 2022-11-09 PROCEDURE — 74011250637 HC RX REV CODE- 250/637: Performed by: NURSE PRACTITIONER

## 2022-11-09 RX ADMIN — CIPROFLOXACIN 500 MG: 500 TABLET, FILM COATED ORAL at 21:46

## 2022-11-09 RX ADMIN — ACETAMINOPHEN 650 MG: 325 TABLET ORAL at 03:41

## 2022-11-09 RX ADMIN — CIPROFLOXACIN 500 MG: 500 TABLET, FILM COATED ORAL at 09:25

## 2022-11-09 RX ADMIN — LEVOTHYROXINE SODIUM 100 MCG: 0.1 TABLET ORAL at 05:50

## 2022-11-09 RX ADMIN — PANTOPRAZOLE SODIUM 20 MG: 20 TABLET, DELAYED RELEASE ORAL at 05:50

## 2022-11-09 RX ADMIN — ACETAMINOPHEN 650 MG: 325 TABLET ORAL at 14:41

## 2022-11-09 NOTE — PROGRESS NOTES
[facility] notified of patients Blood pressure. Bedside shift change report given to [address] [place] [address] (oncoming nurse) by Gladys Hogue (offgoing nurse). Report included the following information SBAR, Kardex, MAR, and Recent Results.

## 2022-11-09 NOTE — PROGRESS NOTES
Problem: Pain  Goal: *Control of Pain  Outcome: Progressing Towards Goal     Problem: Falls - Risk of  Goal: *Absence of Falls  Description: Document Kamilah Fall Risk and appropriate interventions in the flowsheet.   Outcome: Progressing Towards Goal  Note: Fall Risk Interventions:  Mobility Interventions: Bed/chair exit alarm         Medication Interventions: Bed/chair exit alarm    Elimination Interventions: Call light in reach, Patient to call for help with toileting needs    History of Falls Interventions: Bed/chair exit alarm

## 2022-11-09 NOTE — PROGRESS NOTES
Hospitalist Progress Note    Subjective:   Daily Progress Note: 11/9/2022 11:08 AM    Hospital Course:  PT admitted for complaints of nausea/vomiting, CT of abdomen/pelvis showing cecal bascule prominent distal small bowel extending to the ileocecal valve no volvulus. She had NGT placed and underwent a laparoscopic limited right hemicolectomy. Repeat CT abdomen/pelvis showing mild terminal ileal wall thickening to the anastomosis, small bowel dilation possible ileus. NGT removed, pt had stool and advanced diet to clear liquids, tolerated well, and advanced to regular diet per surgery. Subjective: Pt seen in room, no complaints, no nausea/vomiting. Current Facility-Administered Medications   Medication Dose Route Frequency    ciprofloxacin HCl (CIPRO) tablet 500 mg  500 mg Oral Q12H    pantoprazole (PROTONIX) tablet 20 mg  20 mg Oral ACB    levothyroxine (SYNTHROID) tablet 100 mcg  100 mcg Oral 6am    ondansetron (ZOFRAN ODT) tablet 4 mg  4 mg Oral Q8H PRN    Or    ondansetron (ZOFRAN) injection 4 mg  4 mg IntraVENous Q6H PRN    enoxaparin (LOVENOX) injection 40 mg  40 mg SubCUTAneous DAILY    fluticasone-umeclidinium-vilanterol (TRELEGY ELLIPTA) inhaler 1 Puff (Patient Supplied)  1 Puff Inhalation DAILY    albuterol (PROVENTIL HFA, VENTOLIN HFA, PROAIR HFA) inhaler 2 Puff  2 Puff Inhalation Q4H PRN    acetaminophen (TYLENOL) tablet 650 mg  650 mg Oral Q6H PRN    Or    acetaminophen (TYLENOL) suppository 650 mg  650 mg Rectal Q6H PRN    glucose chewable tablet 16 g  4 Tablet Oral PRN    glucagon (GLUCAGEN) injection 1 mg  1 mg IntraMUSCular PRN    dextrose 10% infusion 0-250 mL  0-250 mL IntraVENous PRN        Review of Systems:    Review of Systems   Constitutional:  Negative for chills and fever. Respiratory:  Negative for cough and shortness of breath. Cardiovascular:  Negative for chest pain and palpitations. Gastrointestinal:  Negative for nausea and vomiting.    Genitourinary:  Negative for dysuria and urgency. Musculoskeletal:  Negative for myalgias and neck pain. Neurological:  Negative for dizziness and headaches. Objective:     Visit Vitals  /71 (BP 1 Location: Left upper arm, BP Patient Position: At rest)   Pulse 61   Temp 98.1 °F (36.7 °C)   Resp 18   Ht 5' 2\" (1.575 m)   Wt 63.5 kg (140 lb)   SpO2 95%   BMI 25.61 kg/m²    O2 Flow Rate (L/min): 2 l/min O2 Device: None (Room air)    Temp (24hrs), Av °F (36.7 °C), Min:97.6 °F (36.4 °C), Max:98.4 °F (36.9 °C)      701 - 1900  In: -   Out: 20 [Drains:20]  1901 - 700  In: 2065 [P.O.:270; I.V.:1795]  Out: 330 [Drains:330]    PHYSICAL EXAM:    Physical Exam   Constitutional:       General: She is not in acute distress. Cardiovascular:      Rate and Rhythm: Normal rate and regular rhythm. Pulses: Normal pulses. Heart sounds: Normal heart sounds. Pulmonary:      Effort: Pulmonary effort is normal.      Breath sounds: Normal breath sounds. Abdominal:      General: Bowel sounds are normal.      Palpations: Abdomen is soft. Comments: KRUNAL to abdomen, serous drainage   Neurological:      Mental Status: She is oriented to person, place, and time. Psychiatric:         Mood and Affect: Mood normal.         Behavior: Behavior normal.        Data Review    Recent Results (from the past 24 hour(s))   GLUCOSE, POC    Collection Time: 22 12:04 PM   Result Value Ref Range    Glucose (POC) 111 (H) 65 - 100 mg/dL    Performed by Janak Do        CT ABD PELV WO CONT   Final Result   Postsurgical appearance suggestive of ileus cystectomy with mild   terminal ileal wall thickening to the anastomosis which may reflect   postoperative changes with mild upstream small bowel dilation likely reflecting   ileus. Incidentals as above including probable postoperative related small   pleural effusions and broad body wall edema. CT ABD PELV WO CONT   Final Result   Cecal bascule.  Prominent distal small bowel extending to the ileocecal valve but   no volvulus. Trace ascites. XR CHEST PORT   Final Result   Enteric tube in expected position. Active Problems:    Volvulus of descending colon ([facility] [address] (11/3/2022)        Assessment/Plan:     GLUCOSE, POC     Collection Time: 11/07/22 11:43 PM   Result Value Ref Range     Glucose (POC) 87 65 - 100 mg/dL     Performed by Elvia Santamaria, POC     Collection Time: 11/08/22 12:04 PM   Result Value Ref Range     Glucose (POC) 111 (H) 65 - 100 mg/dL     Performed by Danish Zamarripa              CT ABD PELV WO CONT   Final Result   Postsurgical appearance suggestive of ileus cystectomy with mild   terminal ileal wall thickening to the anastomosis which may reflect   postoperative changes with mild upstream small bowel dilation likely reflecting   ileus. Incidentals as above including probable postoperative related small   pleural effusions and broad body wall edema. CT ABD PELV WO CONT   Final Result   Cecal bascule. Prominent distal small bowel extending to the ileocecal valve but   no volvulus. Trace ascites. XR CHEST PORT   Final Result   Enteric tube in expected position. 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  CT abdomen/pelvis mild terminal ileal wall thickening to the anastomosis, small bowel dilation possible ileus. Advanced diet to regular today. 2. DM II- BS less than 150, acceptable range     3. Hypertension- not on medication, will need to follow up with PCP after discharge for management. 4. GERD- PPI     5. Hypothyroidism- on levothyroxine. 6. Discharge- plan for tomorrow if tolerating PO diet.              DVT Prophylaxis:lovenox  Code Status:  Full Code  POA: NOK  Leatha Snowball        Care Plan discussed with:   _________patient, staff nurse, CM______________________________________________________    Avanell Gal, NP

## 2022-11-09 NOTE — PROGRESS NOTES
Per IDR Pt is advancing diet, if tolerated Pt may be ready for D/C tomorrow. As of this time Pt will D/C to home with no needs.

## 2022-11-10 VITALS
WEIGHT: 140 LBS | HEIGHT: 62 IN | SYSTOLIC BLOOD PRESSURE: 127 MMHG | BODY MASS INDEX: 25.76 KG/M2 | OXYGEN SATURATION: 96 % | HEART RATE: 85 BPM | TEMPERATURE: 97.5 F | RESPIRATION RATE: 18 BRPM | DIASTOLIC BLOOD PRESSURE: 62 MMHG

## 2022-11-10 LAB
GLUCOSE BLD STRIP.AUTO-MCNC: 130 MG/DL (ref 65–100)
PERFORMED BY, TECHID: ABNORMAL

## 2022-11-10 PROCEDURE — 74011250637 HC RX REV CODE- 250/637: Performed by: NURSE PRACTITIONER

## 2022-11-10 PROCEDURE — 99024 POSTOP FOLLOW-UP VISIT: CPT | Performed by: SURGERY

## 2022-11-10 PROCEDURE — 94640 AIRWAY INHALATION TREATMENT: CPT

## 2022-11-10 PROCEDURE — 74011250636 HC RX REV CODE- 250/636: Performed by: SURGERY

## 2022-11-10 PROCEDURE — 82962 GLUCOSE BLOOD TEST: CPT

## 2022-11-10 PROCEDURE — 74011250637 HC RX REV CODE- 250/637: Performed by: SURGERY

## 2022-11-10 RX ORDER — CIPROFLOXACIN 500 MG/1
500 TABLET ORAL EVERY 12 HOURS
Qty: 18 TABLET | Refills: 0 | Status: SHIPPED | OUTPATIENT
Start: 2022-11-10 | End: 2022-11-19

## 2022-11-10 RX ORDER — ACETAMINOPHEN 325 MG/1
650 TABLET ORAL
Qty: 120 TABLET | Refills: 0 | Status: SHIPPED
Start: 2022-11-10 | End: 2022-12-10

## 2022-11-10 RX ADMIN — PANTOPRAZOLE SODIUM 20 MG: 20 TABLET, DELAYED RELEASE ORAL at 06:24

## 2022-11-10 RX ADMIN — ACETAMINOPHEN 650 MG: 325 TABLET ORAL at 09:56

## 2022-11-10 RX ADMIN — LEVOTHYROXINE SODIUM 100 MCG: 0.1 TABLET ORAL at 06:23

## 2022-11-10 RX ADMIN — CIPROFLOXACIN 500 MG: 500 TABLET, FILM COATED ORAL at 09:24

## 2022-11-10 RX ADMIN — ENOXAPARIN SODIUM 40 MG: 100 INJECTION SUBCUTANEOUS at 09:58

## 2022-11-10 NOTE — DISCHARGE SUMMARY
Hospitalist Discharge Summary     Patient ID:    Demario Ash  [id number]  82 y.o.  1940    Admit date: 11/2/2022    Discharge date : 11/10/2022        Final Diagnoses: Active Problems:    Volvulus of descending colon ([address] (11/3/2022)        Reason for Hospitalization:  Abdominal pain    Hospital Course:   PT admitted for complaints of nausea/vomiting, CT of abdomen/pelvis showing cecal bascule prominent distal small bowel extending to the ileocecal valve no volvulus. She had NGT placed and underwent a laparoscopic limited right hemicolectomy. Repeat CT abdomen/pelvis showing mild terminal ileal wall thickening to the anastomosis, small bowel dilation possible ileus. NGT was subsequently removed and diet was advanced, tolerating well without nausea/vomiting, abdominal pain has resolved and pt is having normal bowel movements. Pt found to have a E.coli UTI and started on ciprofloxacin for total of 10 days, continue for 9 more days as outpatient and follow up with PCP in 3 weeks to repeat urine culture. Pt to follow up with general surgery in 2 weeks post operative appointment. Pt is stable for discharge today home , is in agreement with plan. Discharge Medications:   Current Discharge Medication List        START taking these medications    Details   acetaminophen (TYLENOL) 325 mg tablet Take 2 Tablets by mouth every six (6) hours as needed for Fever or Pain for up to 30 days. Qty: 120 Tablet, Refills: 0  Start date: 11/10/2022, End date: 12/10/2022      ciprofloxacin HCl (CIPRO) 500 mg tablet Take 1 Tablet by mouth every twelve (12) hours for 9 days. Indications: bacterial urinary tract infection  Qty: 18 Tablet, Refills: 0  Start date: 11/10/2022, End date: 11/19/2022           CONTINUE these medications which have NOT CHANGED    Details   escitalopram oxalate (LEXAPRO) 10 mg tablet       omeprazole (PRILOSEC OTC) 20 mg tablet Take 20 mg by mouth daily.       aspirin delayed-release 325 mg tablet Take 1 Tab by mouth two (2) times a day. Indications: post op DVT prevention  Qty: 60 Tab, Refills: 0    Comments: Take 1 tab PO twice daily with meals for 3 weeks, then once daily for 3 weeks, then discontinue. fluticasone propionate (FLONASE) 50 mcg/actuation nasal spray 2 Sprays by Both Nostrils route daily. fluticasone-umeclidinium-vilanterol (Trelegy Ellipta) 100-62.5-25 mcg inhaler Take 1 Puff by inhalation daily. cholecalciferol (VITAMIN D3) (1000 Units /25 mcg) tablet Take 2,000 Units by mouth daily. famotidine (PEPCID) 20 mg tablet Take 20 mg by mouth two (2) times a day. levothyroxine (SYNTHROID) 100 mcg tablet Take 100 mcg by mouth Daily (before breakfast). montelukast (SINGULAIR) 10 mg tablet Take 10 mg by mouth daily. tolterodine ER (DETROL LA) 4 mg ER capsule Take 4 mg by mouth daily. albuterol (PROVENTIL HFA, VENTOLIN HFA, PROAIR HFA) 90 mcg/actuation inhaler Take 2 Puffs by inhalation every four (4) hours as needed for Wheezing. MULTIVITAMIN PO Take 1 Tab by mouth daily. STOP taking these medications       guaiFENesin (Mucinex) 1,200 mg Ta12 ER tablet Comments:   Reason for Stopping:         azithromycin (ZITHROMAX) 500 mg tab Comments:   Reason for Stopping:         colestipoL (COLESTID) 1 gram tablet Comments:   Reason for Stopping:         lidocaine 3.5 % ptmd Comments:   Reason for Stopping: Follow up Care:    1. Andrea Rojas MD in 3 weeks. Follow-up Information       Follow up With Specialties Details Why Contact Info    Andrea Rojas MD Family Medicine Follow up in 3 week(s) repeat urine culture after UTI treatment.  [address] [place] [address]  [phone]      Dickson, Geoffrey Calderon MD Surgery Physician, General and Vascular Surgery Follow up in 2 week(s) follow up post op appointment [address] [facility] [address]  [phone]                * Follow-up Care/Patient Instructions: Activity: Activity as tolerated  Diet: Regular Diet  Wound Care: As directed, durabond w/steri strips, remove tomorrow, KRUNAL site small bandaid    Condition at Discharge:  Stable  __________________________________________________________________    Disposition  Home or Self Care  ____________________________________________________________________    Code Status:  Full Code  ___________________________________________________________________    Discharge Exam:  Patient seen and examined by me on discharge day. Physical Exam  Constitutional:       General: She is not in acute distress. Cardiovascular:      Rate and Rhythm: Normal rate and regular rhythm. Pulses: Normal pulses. Heart sounds: Normal heart sounds. Pulmonary:      Effort: Pulmonary effort is normal.      Breath sounds: Normal breath sounds. Abdominal:      General: Bowel sounds are normal.      Palpations: Abdomen is soft. Musculoskeletal:         General: Normal range of motion. Skin:     General: Skin is warm and dry. Neurological:      Mental Status: She is oriented to person, place, and time. Psychiatric:         Behavior: Behavior normal.        CONSULTATIONS: General Surgery    Significant Diagnostic Studies:   Recent Results (from the past 24 hour(s))   GLUCOSE, POC    Collection Time: 11/10/22  7:55 AM   Result Value Ref Range    Glucose (POC) 130 (H) 65 - 100 mg/dL    Performed by Emily Miles      CT ABD PELV WO CONT   Final Result   Postsurgical appearance suggestive of ileus cystectomy with mild   terminal ileal wall thickening to the anastomosis which may reflect   postoperative changes with mild upstream small bowel dilation likely reflecting   ileus. Incidentals as above including probable postoperative related small   pleural effusions and broad body wall edema. CT ABD PELV WO CONT   Final Result   Cecal bascule.  Prominent distal small bowel extending to the ileocecal valve but   no volvulus. Trace ascites. XR CHEST PORT   Final Result   Enteric tube in expected position. Discharge: time spent 35 minutes in discharge  Education and counseling.      Signed:  Debra Fulton NP  11/10/2022  9:33 AM

## 2022-11-10 NOTE — PROGRESS NOTES
Patient examined this morning. She looks comfortable. Tolerating diet. Abdomen soft. Patient can be discharged home today and I will see her back my office in 2 weeks follow-up.

## 2022-11-10 NOTE — PROGRESS NOTES
Patient discharging home today, self care. Family is present in room. Discharge plan of care/case management plan validated with provider discharge order. Medicare pt has received, reviewed, and signed 2nd IM letter informing them of their right to appeal the discharge.   Signed copied has been placed on pt bedside chart.]

## 2022-11-28 ENCOUNTER — OFFICE VISIT (OUTPATIENT)
Dept: SURGERY | Age: 82
End: 2022-11-28
Payer: MEDICARE

## 2022-11-28 VITALS
BODY MASS INDEX: 25.76 KG/M2 | TEMPERATURE: 98.2 F | HEART RATE: 92 BPM | HEIGHT: 62 IN | WEIGHT: 140 LBS | SYSTOLIC BLOOD PRESSURE: 128 MMHG | OXYGEN SATURATION: 93 % | DIASTOLIC BLOOD PRESSURE: 68 MMHG

## 2022-11-28 DIAGNOSIS — Z09 POSTOPERATIVE EXAMINATION: Primary | ICD-10-CM

## 2022-11-28 PROCEDURE — 99024 POSTOP FOLLOW-UP VISIT: CPT | Performed by: SURGERY

## 2022-11-28 NOTE — PROGRESS NOTES
Identified pt with two pt identifiers (name and ). Reviewed chart in preparation for visit and have obtained necessary documentation. Sumi Stiles is a [de-identified] y.o. female  Chief Complaint   Patient presents with    Follow-up      Laparoscopic Right Gadiel      Visit Vitals  /68 (BP 1 Location: Left upper arm, BP Patient Position: Sitting, BP Cuff Size: Adult)   Pulse 92   Temp 98.2 °F (36.8 °C) (Temporal)   Ht 5' 2\" (1.575 m)   Wt 140 lb (63.5 kg)   SpO2 93%   BMI 25.61 kg/m²       1. Have you been to the ER, urgent care clinic since your last visit? Hospitalized since your last visit? No    2. Have you seen or consulted any other health care providers outside of the [address] [place] [address] since your last visit? Include any pap smears or colon screening.  No

## 2022-11-30 PROBLEM — Z09 POSTOPERATIVE EXAMINATION: Status: ACTIVE | Noted: 2022-11-30

## 2022-11-30 NOTE — PROGRESS NOTES
SUBJECTIVE:  Very pleasant woman is here today postop follow-up. Patient had a laparoscopic right hemicolectomy. Patient doing well. Past Medical History:  No date: Abdominal pain, left lower quadrant  No date: Acid reflux  05/2012: Acoustic neuroma ([address]      Comment:  GAMMA KNIFE DR Desmond West  No date: Arthritis  6/27/2022: Cerebral infarct ([address]  No date: Change in bowel movement  No date: Chronic obstructive pulmonary disease (HCC)  No date: Chronic pain      Comment:  left  leg  No date: Colon polyps      Comment:  BENIGN  12-: Contusion of left wrist  No date: Degeneration of intervertebral disc of lumbar region  No date: Degenerative lumbar disc  No date: Diarrhea  No date: Diverticulitis  No date: Diverticulosis  No date: Dysarthria  No date: GERD (gastroesophageal reflux disease)  No date: GERD (gastroesophageal reflux disease)  12/14/2021: H/O fall  No date: Hearing loss in left ear      Comment:  BONE INDUCTION HEARING AIDE, LEFT EAR. 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   No date: Unspecified lack of coordination  Past Surgical History:  2015: COLONOSCOPY  01/09/2003: COLONOSCOPY      Comment:  FLEXIBLE SIGMOIDOSCOPY  04/12/2006: COLONOSCOPY  07/10/2009: COLONOSCOPY  No date: COLONOSCOPY,DIAGNOSTIC  2008: HC SLNG OBTURATOR SYS TVT GYNECA  2014: HX CATARACT REMOVAL  2011: HX CERVICAL FUSION      Comment:  C5-7  2010: HX CHOLECYSTECTOMY  2016: HX COLONOSCOPY  2008: HX GYN      Comment:  Esha Holland  2016: HX HEART CATHETERIZATION      Comment:  NEGATIVE PER PATIENT  2017: HX HEENT; Left      Comment:  BONE INDUCTION FOR HEARING AID  2018: HX HIP REPLACEMENT; Right      Comment:  DR Chelsea Okeefe  1976: HX HYSTERECTOMY  1976: HX HYSTERECTOMY  2014: HX LUMBAR FUSION  2006: HX ORTHOPAEDIC      Comment:  TRIGGER FINGER  No date: HX ORTHOPAEDIC      Comment:  R KNEE REPLACEMENT  No date: HX ORTHOPAEDIC      Comment:  HAND SURGERY  No date: HX ORTHOPAEDIC      Comment:  CERVICAL FUSION  2012: HX OTHER SURGICAL      Comment:  GAMMA KNIFE RADIATION  No date: HX OTHER SURGICAL      Comment:  BLADDER SURGERY  1965: HX OTHER SURGICAL      Comment:  ANAL FISTULA  2003: HX REFRACTIVE SURGERY; Bilateral      Comment:  LASIK  : HX TONSILLECTOMY  No date: HX TONSILLECTOMY  2008: [place] COMBINED ANT/POST COLPORRHAPHY  1965: [place] REMOVAL OF ANAL FISSURE  @[email or web address]  Social History    Tobacco Use      Smoking status: Former        Packs/day: 1.00        Years: 30.00        Pack years: 30        Types: Cigarettes        Quit date: 1987        Years since quittin.8      Smokeless tobacco: Never    Alcohol use: Yes      Alcohol/week: 5.8 standard drinks      Types: 7 Glasses of wine per week    Prior to Admission medications :  Medication acetaminophen (TYLENOL) 325 mg tablet, Sig Take 2 Tablets by mouth every six (6) hours as needed for Fever or Pain for up to 30 days. , Start Date 11/10/22, End Date 12/10/22, Taking? Yes, Authorizing Provider Latonya VALENTINE NP    Medication escitalopram oxalate (LEXAPRO) 10 mg tablet, Sig , Start Date 10/1/22, End Date , Taking? Yes, Authorizing Provider Provider, Historical    Medication omeprazole (PRILOSEC OTC) 20 mg tablet, Sig Take 20 mg by mouth daily. , Start Date , End Date , Taking? Yes, Authorizing Provider Provider, Historical    Medication aspirin delayed-release 325 mg tablet, Sig Take 1 Tab by mouth two (2) times a day. Indications: post op DVT prevention, Start Date 4/27/21, End Date , Taking? Yes, Authorizing Provider Rae Jacques PA-C    Medication fluticasone propionate (FLONASE) 50 mcg/actuation nasal spray, Sig 2 Sprays by Both Nostrils route daily. , Start Date , End Date , Taking? Yes, Authorizing Provider Provider, Historical    Medication fluticasone-umeclidinium-vilanterol (Trelegy Ellipta) 100-62.5-25 mcg inhaler, Sig Take 1 Puff by inhalation daily. , Start Date , End Date , Taking? Yes, Authorizing Provider Provider, Historical    Medication cholecalciferol (VITAMIN D3) (1000 Units /25 mcg) tablet, Sig Take 2,000 Units by mouth daily. , Start Date , End Date , Taking? Yes, Authorizing Provider Provider, Historical    Medication famotidine (PEPCID) 20 mg tablet, Sig Take 20 mg by mouth two (2) times a day., Start Date , End Date , Taking? Yes, Authorizing Provider Provider, Historical    Medication levothyroxine (SYNTHROID) 100 mcg tablet, Sig Take 100 mcg by mouth Daily (before breakfast). , Start Date , End Date , Taking? Yes, Authorizing Provider Provider, Historical    Medication montelukast (SINGULAIR) 10 mg tablet, Sig Take 10 mg by mouth daily. , Start Date , End Date , Taking? Yes, Authorizing Provider Provider, Historical    Medication tolterodine ER (DETROL LA) 4 mg ER capsule, Sig Take 4 mg by mouth daily. , Start Date , End Date , Taking? Yes, Authorizing Provider Provider, Historical    Medication albuterol (PROVENTIL HFA, VENTOLIN HFA, PROAIR HFA) 90 mcg/actuation inhaler, Sig Take 2 Puffs by inhalation every four (4) hours as needed for Wheezing., Start Date , End Date , Taking? Yes, Authorizing Provider Provider, Historical    Medication MULTIVITAMIN PO, Sig Take 1 Tab by mouth daily. , Start Date , End Date , Taking?  Yes, Authorizing Provider Provider, Historical       -- Ceclor [Cefaclor] -- Hives   -- Golytely [Peg-Electrolyte Soln] -- Other (comments)      OBJECTIVE:  /68 (BP 1 Location: Left upper arm, BP Patient Position: Sitting, BP Cuff Size: Adult)   Pulse 92   Temp 98.2 °F (36.8 °C) (Temporal)   Ht 5' 2\" (1.575 m)   Wt 140 lb (63.5 kg)   SpO2 93%   BMI 25.61 kg/m²     PT IS PLEASANT AND AWAKE AND ALERT. WOUND EXAM:  Abdomen soft wounds are clean and dry. ASSESSMENT:  Problem List Items Addressed This Visit        Other    Postoperative examination - Primary       PLAN:  Patient doing well postop. I will reevaluate her again in 2-month follow-up.

## 2022-12-13 ENCOUNTER — OFFICE VISIT (OUTPATIENT)
Dept: GASTROENTEROLOGY | Age: 82
End: 2022-12-13
Payer: MEDICARE

## 2022-12-13 VITALS
DIASTOLIC BLOOD PRESSURE: 70 MMHG | WEIGHT: 141.4 LBS | HEART RATE: 83 BPM | OXYGEN SATURATION: 95 % | BODY MASS INDEX: 26.02 KG/M2 | SYSTOLIC BLOOD PRESSURE: 150 MMHG | HEIGHT: 62 IN | RESPIRATION RATE: 14 BRPM | TEMPERATURE: 97.5 F

## 2022-12-13 DIAGNOSIS — Z90.49 STATUS POST RIGHT HEMICOLECTOMY: ICD-10-CM

## 2022-12-13 DIAGNOSIS — A49.8 E COLI INFECTION: ICD-10-CM

## 2022-12-13 DIAGNOSIS — K56.2: Primary | ICD-10-CM

## 2022-12-13 DIAGNOSIS — A09 DIARRHEA OF INFECTIOUS ORIGIN: ICD-10-CM

## 2022-12-13 RX ORDER — GUAIFENESIN 1200 MG/1
1200 TABLET, EXTENDED RELEASE ORAL 2 TIMES DAILY
COMMUNITY

## 2022-12-18 NOTE — PROGRESS NOTES
Gaby Tang is a [de-identified] y.o. female who presents today for the following:  Chief Complaint   Patient presents with    Follow-up     OV on 10-. Patient was in the [facility] 11-2-2022  DR LAURE lopez-R hemicolectomy. Allergies   Allergen Reactions    Ceclor [Cefaclor] Hives    Golytely [Peg-Electrolyte Soln] Other (comments)       Current Outpatient Medications   Medication Sig    guaiFENesin (Mucinex) 1,200 mg Ta12 ER tablet Take 1,200 mg by mouth two (2) times a day. omeprazole (PRILOSEC OTC) 20 mg tablet Take 20 mg by mouth daily. aspirin delayed-release 325 mg tablet Take 1 Tab by mouth two (2) times a day. Indications: post op DVT prevention    fluticasone propionate (FLONASE) 50 mcg/actuation nasal spray 2 Sprays by Both Nostrils route daily. fluticasone-umeclidinium-vilanterol (Trelegy Ellipta) 100-62.5-25 mcg inhaler Take 1 Puff by inhalation daily. cholecalciferol (VITAMIN D3) (1000 Units /25 mcg) tablet Take 2,000 Units by mouth daily. levothyroxine (SYNTHROID) 100 mcg tablet Take 100 mcg by mouth Daily (before breakfast). montelukast (SINGULAIR) 10 mg tablet Take 10 mg by mouth daily. tolterodine ER (DETROL LA) 4 mg ER capsule Take 4 mg by mouth daily. MULTIVITAMIN PO Take 1 Tab by mouth daily. escitalopram oxalate (LEXAPRO) 10 mg tablet  (Patient not taking: Reported on 12/13/2022)    famotidine (PEPCID) 20 mg tablet Take 20 mg by mouth two (2) times a day. (Patient not taking: Reported on 12/13/2022)    albuterol (PROVENTIL HFA, VENTOLIN HFA, PROAIR HFA) 90 mcg/actuation inhaler Take 2 Puffs by inhalation every four (4) hours as needed for Wheezing. No current facility-administered medications for this visit.        Past Medical History:   Diagnosis Date    Abdominal pain, left lower quadrant     Acid reflux     Acoustic neuroma ([facility] 05/2012    GAMMA KNIFE DR Severino Craft    Arthritis     Cerebral infarct ([facility] 6/27/2022    Change in bowel movement Chronic obstructive pulmonary disease (HCC)     Chronic pain     left  leg    Colon polyps     BENIGN    Contusion of left wrist 12-    Degeneration of intervertebral disc of lumbar region     Degenerative lumbar disc     Diarrhea     Diverticulitis     Diverticulosis     Dysarthria     GERD (gastroesophageal reflux disease)     GERD (gastroesophageal reflux disease)     H/O fall 12/14/2021    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 Hearing loss of left ear     Heartburn     Hemorrhage of rectum     Hemorrhoids     Hyperkalemia 6/27/2022    Hypothyroid     Hypothyroidism, unspecified 6/27/2022    Intracranial and intraspinal phlebitis and thrombophlebitis     Loose stools     Monoplegia (HCC)     Nausea & vomiting     Other ill-defined conditions(799.89)     cavinous sinus thrombosis    Rib pain 12/14/2021    Shingles 2014    Slurred speech     Stroke ([facility] 08/2012    CAVERNOUS SINUS THROMBOSIS; RIGHT HAND WEAKNESS.     Unspecified lack of coordination        Past Surgical History:   Procedure Laterality Date    COLONOSCOPY  2015    COLONOSCOPY  01/09/2003    FLEXIBLE SIGMOIDOSCOPY    COLONOSCOPY  04/12/2006    COLONOSCOPY  07/10/2009    COLONOSCOPY,DIAGNOSTIC      HC SLNG OBTURATOR SYS TVT GYNECA  2008    HX CATARACT REMOVAL  2014    HX CERVICAL FUSION  2011    C5-7    HX CHOLECYSTECTOMY  2010    HX COLONOSCOPY  2016    HX GYN  2008    COLOPORRHAPHY    HX HEART CATHETERIZATION  2016    NEGATIVE PER PATIENT    HX HEENT Left 2017    BONE INDUCTION FOR HEARING AID    HX HIP REPLACEMENT Right 04/2018    DR Bridget Juárez    HX HYSTERECTOMY  1976    HX HYSTERECTOMY  1976    HX LUMBAR FUSION  2014    HX ORTHOPAEDIC  2006    TRIGGER FINGER    HX ORTHOPAEDIC      R KNEE REPLACEMENT    HX ORTHOPAEDIC      HAND SURGERY    HX ORTHOPAEDIC      CERVICAL FUSION    HX OTHER SURGICAL  2012    GAMMA KNIFE RADIATION    HX OTHER SURGICAL      BLADDER SURGERY    HX OTHER SURGICAL  1965    ANAL FISTULA    HX OTHER SURGICAL Right 2022    laproscopic R Hemicolectomy    HX REFRACTIVE SURGERY Bilateral 2003    LASIK    HX TONSILLECTOMY  1948    HX TONSILLECTOMY      [place] COMBINED ANT/POST COLPORRHAPHY  2008    [place] REMOVAL OF ANAL FISSURE  1965       Family History   Problem Relation Age of Onset    Other Mother         MYASTHENIA GRAVIS    Diabetes Mother [de-identified]    Cancer Brother         bladder    Clotting Disorder Father     No Known Problems Son     No Known Problems Son     No Known Problems Daughter     Heart Disease Other     Hypertension Other     Anesth Problems Neg Hx        Social History     Socioeconomic History    Marital status:      Spouse name: Not on file    Number of children: Not on file    Years of education: Not on file    Highest education level: Not on file   Occupational History    Not on file   Tobacco Use    Smoking status: Former     Packs/day: 1.00     Years: 30.00     Pack years: 30.00     Types: Cigarettes     Quit date: 1987     Years since quittin.8    Smokeless tobacco: Never   Vaping Use    Vaping Use: Never used   Substance and Sexual Activity    Alcohol use:  Yes     Alcohol/week: 5.8 standard drinks     Types: 7 Glasses of wine per week    Drug use: Not Currently    Sexual activity: Not on file   Other Topics Concern    Not on file   Social History Narrative    Not on file     Social Determinants of Health     Financial Resource Strain: Not on file   Food Insecurity: Not on file   Transportation Needs: Not on file   Physical Activity: Not on file   Stress: Not on file   Social Connections: Not on file   Intimate Partner Violence: Not on file   Housing Stability: Not on file         HPI  79-year-old female with history of gastroesophageal reflux disease, thyroidism, status post CVA, COPD, osteoarthritis, and chronic diarrhea who was recently hospitalized last month with a cecal volvulus resulting in a laparoscopic right hemicolectomy on 2022 at [facility]  . Patient states she has been doing better since the surgery. She still has frequent bowel movements in the mornings. She generally has 2-3 bowel movements in the mornings which are small but formed. She completed the treatment for the coli infection of her GERD which is felt to be the cause of her diarrhea. She saw Dr. Ralph Wright recently and urinalysis was done which was okay. Review of Systems   Constitutional: Negative. HENT: Negative. Negative for nosebleeds. Eyes: Negative. Respiratory: Negative. Cardiovascular: Negative. Gastrointestinal:  Positive for diarrhea. Negative for abdominal pain, blood in stool, constipation, heartburn, melena, nausea and vomiting. Genitourinary: Negative. Musculoskeletal:  Positive for joint pain. Skin: Negative. Neurological: Negative. Endo/Heme/Allergies: Negative. Psychiatric/Behavioral: Negative. All other systems reviewed and are negative. Visit Vitals  BP (!) 150/70 (BP 1 Location: Left upper arm, BP Patient Position: Sitting, BP Cuff Size: Adult)   Pulse 83   Temp 97.5 °F (36.4 °C) (Temporal)   Resp 14   Ht 5' 2\" (1.575 m)   Wt 64.1 kg (141 lb 6.4 oz)   SpO2 95% Comment: room air   BMI 25.86 kg/m²     Physical Exam  Vitals and nursing note reviewed. Constitutional:       Appearance: Normal appearance. She is normal weight. HENT:      Head: Normocephalic and atraumatic. Nose: Nose normal.      Mouth/Throat:      Mouth: Mucous membranes are moist.      Pharynx: Oropharynx is clear. Eyes:      General: No scleral icterus. Conjunctiva/sclera: Conjunctivae normal.      Pupils: Pupils are equal, round, and reactive to light. Cardiovascular:      Rate and Rhythm: Normal rate and regular rhythm. Pulses: Normal pulses. Heart sounds: Normal heart sounds. Pulmonary:      Effort: Pulmonary effort is normal.      Breath sounds: Normal breath sounds.    Abdominal:      General: Bowel sounds are normal. There is no distension. Palpations: Abdomen is soft. There is no mass. Tenderness: There is no abdominal tenderness. There is no right CVA tenderness, left CVA tenderness, guarding or rebound. Hernia: No hernia is present. Musculoskeletal:         General: Normal range of motion. Cervical back: Normal range of motion and neck supple. Skin:     General: Skin is warm and dry. Coloration: Skin is not jaundiced. Neurological:      General: No focal deficit present. Mental Status: She is alert and oriented to person, place, and time. Psychiatric:         Mood and Affect: Mood normal.         Behavior: Behavior normal.         Thought Content: Thought content normal.         Judgment: Judgment normal.          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 Status post right hemicolectomy  Secondary to cecal volvulus    3.  Diarrhea of infectious origin  Improved but bowel movements are still frequent but formed    4. E coli infection  Involving the GI tract and status post treatment

## 2022-12-30 PROBLEM — Z09 POSTOPERATIVE EXAMINATION: Status: RESOLVED | Noted: 2022-11-30 | Resolved: 2022-12-30

## 2023-01-09 ENCOUNTER — TRANSCRIBE ORDER (OUTPATIENT)
Dept: SCHEDULING | Age: 83
End: 2023-01-09

## 2023-01-09 DIAGNOSIS — Z12.31 VISIT FOR SCREENING MAMMOGRAM: Primary | ICD-10-CM

## 2023-01-26 ENCOUNTER — HOSPITAL ENCOUNTER (OUTPATIENT)
Dept: MAMMOGRAPHY | Age: 83
Discharge: HOME | End: 2023-01-26
Attending: FAMILY MEDICINE
Payer: MEDICARE

## 2023-01-26 DIAGNOSIS — Z12.31 VISIT FOR SCREENING MAMMOGRAM: ICD-10-CM

## 2023-01-26 PROCEDURE — 77063 BREAST TOMOSYNTHESIS BI: CPT

## 2023-02-02 ENCOUNTER — OFFICE VISIT (OUTPATIENT)
Dept: SURGERY | Age: 83
End: 2023-02-02
Payer: MEDICARE

## 2023-02-02 VITALS
OXYGEN SATURATION: 94 % | WEIGHT: 145 LBS | TEMPERATURE: 97.3 F | HEART RATE: 65 BPM | HEIGHT: 62 IN | DIASTOLIC BLOOD PRESSURE: 69 MMHG | BODY MASS INDEX: 26.68 KG/M2 | SYSTOLIC BLOOD PRESSURE: 156 MMHG

## 2023-02-02 DIAGNOSIS — M25.562 ACUTE PAIN OF LEFT KNEE: ICD-10-CM

## 2023-02-02 DIAGNOSIS — Z09 POSTOPERATIVE EXAMINATION: ICD-10-CM

## 2023-02-02 DIAGNOSIS — M25.552 HIP PAIN, ACUTE, LEFT: Primary | ICD-10-CM

## 2023-02-02 PROCEDURE — G8417 CALC BMI ABV UP PARAM F/U: HCPCS | Performed by: SURGERY

## 2023-02-02 PROCEDURE — G8427 DOCREV CUR MEDS BY ELIG CLIN: HCPCS | Performed by: SURGERY

## 2023-02-02 PROCEDURE — 1090F PRES/ABSN URINE INCON ASSESS: CPT | Performed by: SURGERY

## 2023-02-02 PROCEDURE — 1101F PT FALLS ASSESS-DOCD LE1/YR: CPT | Performed by: SURGERY

## 2023-02-02 PROCEDURE — 99213 OFFICE O/P EST LOW 20 MIN: CPT | Performed by: SURGERY

## 2023-02-02 PROCEDURE — G8510 SCR DEP NEG, NO PLAN REQD: HCPCS | Performed by: SURGERY

## 2023-02-02 PROCEDURE — G8400 PT W/DXA NO RESULTS DOC: HCPCS | Performed by: SURGERY

## 2023-02-02 PROCEDURE — G8536 NO DOC ELDER MAL SCRN: HCPCS | Performed by: SURGERY

## 2023-02-02 PROCEDURE — 1123F ACP DISCUSS/DSCN MKR DOCD: CPT | Performed by: SURGERY

## 2023-02-02 RX ORDER — AZITHROMYCIN 500 MG/1
TABLET, FILM COATED ORAL
COMMUNITY
Start: 2023-01-19

## 2023-02-02 NOTE — PROGRESS NOTES
Identified pt with two pt identifiers (name and ). Reviewed chart in preparation for visit and have obtained necessary documentation. María Grossman is a [de-identified] y.o. female  Chief Complaint   Patient presents with    Post OP Follow Up     2m follow up-Postoperative examination     Visit Vitals  BP (!) 156/69 (BP 1 Location: Left upper arm, BP Patient Position: Sitting, BP Cuff Size: Large adult)   Pulse 65   Temp 97.3 °F (36.3 °C) (Temporal)   Ht 5' 2\" (1.575 m)   Wt 145 lb (65.8 kg)   SpO2 94%   BMI 26.52 kg/m²       1. Have you been to the ER, urgent care clinic since your last visit? Hospitalized since your last visit? No    2. Have you seen or consulted any other health care providers outside of the [address] [place] [address] since your last visit? Include any pap smears or colon screening. No    Patient and provider made aware of elevated BP x2. Patient asymptomatic. Patient reminded to monitor BP, continue to take BP medications if prescribed, and follow up with PCP/Cardiologist.  Patient expressed understanding and agreement.

## 2023-02-23 PROBLEM — M25.562 ACUTE PAIN OF LEFT KNEE: Status: ACTIVE | Noted: 2023-02-23

## 2023-02-23 PROBLEM — M25.552 HIP PAIN, ACUTE, LEFT: Status: ACTIVE | Noted: 2023-02-23

## 2023-02-23 NOTE — PROGRESS NOTES
General surgery history and Physical    Patient: Elizabeth Owens  MRN: [id number]    YOB: 1940  Age: [de-identified] y.o. Sex: female     Chief Complaint:  Chief Complaint   Patient presents with    Post OP Follow Up     2m follow up-Postoperative examination       History of Present Illness: Elizabeth Owens is a [de-identified] y.o. very pleasant woman is here today for follow-up for laparoscopic right hemicolectomy. This was done more than 3 months ago. Patient doing well. Patient today complaining right hip pain and knee pain has been severe. Denies recent trauma. Social History:  Social Connections: Not on file       Past Medical History:  Past Medical History:   Diagnosis Date    Abdominal pain, left lower quadrant     Acid reflux     Acoustic neuroma ([facility] [address] 05/2012    GAMMA KNIFE DR Yumiko Lipscomb    Arthritis     Cerebral infarct ([facility] [address] 6/27/2022    Change in bowel movement     Chronic obstructive pulmonary disease (HCC)     Chronic pain     left  leg    Colon polyps     BENIGN    Contusion of left wrist 12-    Degeneration of intervertebral disc of lumbar region     Degenerative lumbar disc     Diarrhea     Diverticulitis     Diverticulosis     Dysarthria     GERD (gastroesophageal reflux disease)     GERD (gastroesophageal reflux disease)     H/O fall 12/14/2021    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 Hearing loss of left ear     Heartburn     Hemorrhage of rectum     Hemorrhoids     Hyperkalemia 6/27/2022    Hypothyroid     Hypothyroidism, unspecified 6/27/2022    Intracranial and intraspinal phlebitis and thrombophlebitis     Loose stools     Monoplegia (HCC)     Nausea & vomiting     Other ill-defined conditions(799.89)     cavinous sinus thrombosis    Rib pain 12/14/2021    Shingles 2014    Slurred speech     Stroke ([facility] [address] 08/2012    CAVERNOUS SINUS THROMBOSIS; RIGHT HAND WEAKNESS.     Unspecified lack of coordination        Surgical History:  Past Surgical History:   Procedure Laterality Date    COLONOSCOPY  2015    COLONOSCOPY  01/09/2003    FLEXIBLE SIGMOIDOSCOPY    COLONOSCOPY  04/12/2006    COLONOSCOPY  07/10/2009    COLONOSCOPY,DIAGNOSTIC      HC SLNG OBTURATOR SYS TVT GYNECA  2008    HX CATARACT REMOVAL  2014    HX CERVICAL FUSION  2011    C5-7    HX CHOLECYSTECTOMY  2010    HX COLONOSCOPY  2016    HX GYN  2008    COLOPORRHAPHY    HX HEART CATHETERIZATION  2016    NEGATIVE PER PATIENT    HX HEENT Left 2017    BONE INDUCTION FOR HEARING AID    HX HIP REPLACEMENT Right 04/2018    DR One Sukhi Bass [place]    HX HYSTERECTOMY  1976    HX HYSTERECTOMY  1976    HX LUMBAR FUSION  2014    HX ORTHOPAEDIC  2006    TRIGGER FINGER    HX ORTHOPAEDIC      R KNEE REPLACEMENT    HX ORTHOPAEDIC      HAND SURGERY    HX ORTHOPAEDIC      CERVICAL FUSION    HX OTHER SURGICAL  2012    GAMMA KNIFE RADIATION    HX OTHER SURGICAL      BLADDER SURGERY    HX OTHER SURGICAL  1965    ANAL FISTULA    HX OTHER SURGICAL Right 11/02/2022    laproscopic R Hemicolectomy    HX REFRACTIVE SURGERY Bilateral 2003    LASIK    HX TONSILLECTOMY  1948    HX TONSILLECTOMY      [place] COMBINED ANT/POST COLPORRHAPHY  2008    [place] REMOVAL OF ANAL FISSURE  1965       Allergies: Allergies   Allergen Reactions    Ceclor [Cefaclor] Hives    Golytely [Peg-Electrolyte Soln] Other (comments)       Current Meds:  Current Outpatient Medications   Medication Sig Dispense Refill    omeprazole (PRILOSEC OTC) 20 mg tablet Take 20 mg by mouth daily. aspirin delayed-release 325 mg tablet Take 1 Tab by mouth two (2) times a day. Indications: post op DVT prevention 60 Tab 0    fluticasone propionate (FLONASE) 50 mcg/actuation nasal spray 2 Sprays by Both Nostrils route daily. fluticasone-umeclidinium-vilanterol (Trelegy Ellipta) 100-62.5-25 mcg inhaler Take 1 Puff by inhalation daily. cholecalciferol (VITAMIN D3) (1000 Units /25 mcg) tablet Take 2,000 Units by mouth daily.       levothyroxine (SYNTHROID) 100 mcg tablet Take 100 mcg by mouth Daily (before breakfast). montelukast (SINGULAIR) 10 mg tablet Take 10 mg by mouth daily. tolterodine ER (DETROL LA) 4 mg ER capsule Take 4 mg by mouth daily. albuterol (PROVENTIL HFA, VENTOLIN HFA, PROAIR HFA) 90 mcg/actuation inhaler Take 2 Puffs by inhalation every four (4) hours as needed for Wheezing. MULTIVITAMIN PO Take 1 Tab by mouth daily. azithromycin (ZITHROMAX) 500 mg tab       guaiFENesin (Mucinex) 1,200 mg Ta12 ER tablet Take 1,200 mg by mouth two (2) times a day. (Patient not taking: Reported on 2/2/2023)         Review of Systems:  I reviewed the rest of organ systems personally and they are negative. Pertinent findings discussed in HPI. Physical Examination    Visit Vitals  BP (!) 156/69 (BP 1 Location: Left upper arm, BP Patient Position: Sitting, BP Cuff Size: Large adult)   Pulse 65   Temp 97.3 °F (36.3 °C) (Temporal)   Ht 5' 2\" (1.575 m)   Wt 145 lb (65.8 kg)   SpO2 94%   BMI 26.52 kg/m²     Gen: Well developed, well nourished [de-identified] y.o. female in no acute distress  Head: normocephalic, atraumatic  EYES: Pupils are equal and reactive. Chest wall: Excursion normal with respiration cycle, no obvious audible wheeze. Mouth: Clear, no overt lesions, oral mucosa pink and moist  Neck: supple, no masses, no adenopathy or carotid bruits, trachea midline  Resp: clear to auscultation bilaterally, no wheeze, rhonchi or rales, excursions normal and symmetrical  Cardio: Regular rate and rhythm, no murmurs, clicks, gallops or rubs, no edema or varicosities  Abdomen: Abdomen soft nontender distended. Neuro: sensation and strength grossly intact and symmetrical  Psych: alert and oriented to person, place and time  Skin: warm and moist.  Hematologic system: No bruising. Vascular examination: Intact in lower extremity. Labs:  No results found for this or any previous visit (from the past 24 hour(s)). Images:  X-ray reviewed. CT scan reviewed.           Assessments:  Patient Active Problem List   Diagnosis Code    COPD (chronic obstructive pulmonary disease) ([facility]) J44.9    GERD (gastroesophageal reflux disease) K21.9    Bilateral hearing loss H91.93    Osteoarthritis of right hip M16.11    Spinal stenosis of lumbar region M48.061    Osteoarthritis of right knee M17.11    Cerebral infarct ([facility]) I63.9    Hyperkalemia E87.5    Hypothyroidism, unspecified E03.9    Change in bowel movement R19.8    Loose stools R19.5    Degeneration of intervertebral disc of lumbar region M51.36    Heartburn R12    Diverticulosis K57.90    Monoplegia ([facility]) G83.30    Hemorrhoids K64.9    Degenerative lumbar disc M51.36    Volvulus of descending colon ([facility]) K56.2    Postoperative examination Z09    Hip pain, acute, left M25.552    Acute pain of left knee M25.562       Plans: I will schedule for left hip and left knee x-ray. If there is pertinent findings will refer to orthopedics. Meanwhile patient doing well postop. Patient had a colonoscopy around 5 years ago. Patient will be reassessed in 8 months. And I will give the patient a call with her hip x-ray and the x-ray result.           Echo Galicia MD

## 2023-02-28 ENCOUNTER — HOSPITAL ENCOUNTER (OUTPATIENT)
Dept: GENERAL RADIOLOGY | Age: 83
Discharge: HOME | End: 2023-02-28
Payer: MEDICARE

## 2023-02-28 DIAGNOSIS — M25.562 ACUTE PAIN OF LEFT KNEE: ICD-10-CM

## 2023-02-28 DIAGNOSIS — M25.552 HIP PAIN, ACUTE, LEFT: ICD-10-CM

## 2023-02-28 PROCEDURE — 73502 X-RAY EXAM HIP UNI 2-3 VIEWS: CPT

## 2023-02-28 PROCEDURE — 73560 X-RAY EXAM OF KNEE 1 OR 2: CPT

## 2023-03-15 NOTE — BRIEF OP NOTE
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     Procedure(s):  Laparoscopic Limited Right Gadiel-Colectomy     Surgeon(s):  Misty Forman MD    Surgical Assistant: Surg Asst-1: Heidi November    Anesthesia: General     Estimated Blood Loss (mL): 87SG    Complications:     Specimens:   ID Type Source Tests Collected by Time Destination   1 : Ileocecal complex with Right COLON Preservative Colon, Right  Misty Forman MD 11/4/2022 2352 Pathology   1 : urine Urine Osorio Specimen CULTURE, URINE, URINALYSIS W/MICROSCOPIC Misty Forman MD 11/4/2022 2343 Microbiology        Implants: * No implants in log *    Drains:   Nasogastric Tube 11/03/22 (Active)   Site Assessment Clean, dry, & intact 11/04/22 0806   Securement Device Tape 11/04/22 2031   G Port Status Intermittent Suction 11/04/22 2031   Action Taken Retaped 11/04/22 0806   Drainage Description Green 11/04/22 2235   Output (ml) 50 ml 11/04/22 2235       Findings: as above    Electronically Signed by Rand Santos MD on 11/5/2022 at 12:39 AM
Spontaneous, unlabored and symmetrical

## 2023-05-30 ENCOUNTER — TELEPHONE (OUTPATIENT)
Age: 83
End: 2023-05-30

## 2023-06-02 NOTE — BRIEF OP NOTE
BRIEF OPERATIVE NOTE    Date of Procedure: 8/29/2018   Preoperative Diagnosis: SPINAL STENOSIS L3-4, STATUS POST SPINAL FUSION L4-S1  Postoperative Diagnosis: SPINAL STENOSIS L3-4, STATUS POST SPINAL FUSION L4-S1    Procedure(s):  REVISION LUMBAR LAMINECTOMY OF FUSION L3-S1  Surgeon(s) and Role:     * Sybil Boston MD - Primary         Physician Assistant: Thierno Fountain PA-C    Surgical Staff:  Circ-1: Abhijit Brady RN  Circ-2: aCsandra Nogueira  Circ-Relief: Amanda Cam  Scrub Tech-Relief: Barbarann Ip  Scrub RN-1: Ever Pratt RN  Event Time In   Incision Start 1353   Incision Close 1535     Anesthesia: General   Estimated Blood Loss: 50 ml  Specimens: * No specimens in log *   Findings: Spinal stenosis   Complications: None  Implants:   Implant Name Type Inv.  Item Serial No.  Lot No. LRB No. Used Action   3demin cortical fibers   [id number] [employer] NA N/A 1 Implanted   GRAFT BNE RECOMB HUM INFUS SM --  - SNA  GRAFT BNE RECOMB HUM INFUS SM --  NA [employer] [id number] N/A 1 Implanted   SCR SPNE PRE-ASSBL 6.5X45MM -- CREO 5.5MM - SN/A  SCR SPNE PRE-ASSBL 6.5X45MM -- CREO 5.5MM N/A [employer] N/A N/A 2 Implanted   screw   N/A [employer] N/A N/A 2 Implanted   SPENCER SPNE CRV TI 5.5X85MM -- CREO - SN/A  SPENCER SPNE CRV TI 5.5X85MM -- CREO N/A [employer] N/A N/A 1 Implanted   SPENCER SPNE CRV TI 5.5X90MM -- CREO - SN/A  SPENCER SPNE CRV TI 5.5X90MM -- CREO N/A [employer] N/A N/A 1 Implanted   CAP SPNE LCK CREO 5.5MM --  - SN/A   CAP SPNE LCK CREO 5.5MM --  N/A [employer] N/A N/A 8 Implanted Detail Level: Simple Comment: Finishing month #3 will be initiating month #4\\n\\nPatient reports significant improvement to flares. He has even resumed hot yoga. Patient is pleased. Render Risk Assessment In Note?: no

## 2023-06-21 ENCOUNTER — TELEPHONE (OUTPATIENT)
Age: 83
End: 2023-06-21

## 2023-06-21 NOTE — TELEPHONE ENCOUNTER
Spoke to patient and she is stating she is having diarrhea and loose stools, some times unable to hold stool, I recommended patient goes back to see her GI doctor since she was seeing him for those symptoms previously.  Patient stated her understanding

## 2023-06-21 NOTE — TELEPHONE ENCOUNTER
Cipriano Burk called in stating that she was prescribed a medication from another doctor and she's having issues. I advised that she may want to talk to the doctor that prescribed it. The patient still insisted that she wanted to speak with Dr Mauricio Rees nurse. She said she is having the same issue from before. I asked if she would like to make an appointment, and she stated no. She would like a call back from the nurse. Please call when you are available.  Phone number: [phone]

## 2023-06-23 ENCOUNTER — OFFICE VISIT (OUTPATIENT)
Age: 83
End: 2023-06-23
Payer: MEDICARE

## 2023-06-23 VITALS
SYSTOLIC BLOOD PRESSURE: 150 MMHG | RESPIRATION RATE: 14 BRPM | DIASTOLIC BLOOD PRESSURE: 68 MMHG | TEMPERATURE: 97.4 F | HEART RATE: 83 BPM | WEIGHT: 147.6 LBS | HEIGHT: 62 IN | BODY MASS INDEX: 27.16 KG/M2 | OXYGEN SATURATION: 95 %

## 2023-06-23 DIAGNOSIS — K62.5 RECTAL BLEEDING: ICD-10-CM

## 2023-06-23 DIAGNOSIS — K56.2: ICD-10-CM

## 2023-06-23 DIAGNOSIS — K57.30 DIVERTICULAR DISEASE OF COLON: ICD-10-CM

## 2023-06-23 DIAGNOSIS — K52.9 CHRONIC DIARRHEA: Primary | ICD-10-CM

## 2023-06-23 DIAGNOSIS — K21.00 GASTROESOPHAGEAL REFLUX DISEASE WITH ESOPHAGITIS WITHOUT HEMORRHAGE: ICD-10-CM

## 2023-06-23 DIAGNOSIS — K52.9 CHRONIC DIARRHEA: ICD-10-CM

## 2023-06-23 PROCEDURE — 1090F PRES/ABSN URINE INCON ASSESS: CPT | Performed by: INTERNAL MEDICINE

## 2023-06-23 PROCEDURE — 1036F TOBACCO NON-USER: CPT | Performed by: INTERNAL MEDICINE

## 2023-06-23 PROCEDURE — G8419 CALC BMI OUT NRM PARAM NOF/U: HCPCS | Performed by: INTERNAL MEDICINE

## 2023-06-23 PROCEDURE — 99214 OFFICE O/P EST MOD 30 MIN: CPT | Performed by: INTERNAL MEDICINE

## 2023-06-23 PROCEDURE — 1123F ACP DISCUSS/DSCN MKR DOCD: CPT | Performed by: INTERNAL MEDICINE

## 2023-06-23 PROCEDURE — G8427 DOCREV CUR MEDS BY ELIG CLIN: HCPCS | Performed by: INTERNAL MEDICINE

## 2023-06-23 PROCEDURE — G8400 PT W/DXA NO RESULTS DOC: HCPCS | Performed by: INTERNAL MEDICINE

## 2023-06-23 RX ORDER — MONTELUKAST SODIUM 4 MG/1
1 TABLET, CHEWABLE ORAL 2 TIMES DAILY
Qty: 60 TABLET | Refills: 3 | Status: SHIPPED | OUTPATIENT
Start: 2023-06-23

## 2023-06-23 RX ORDER — PREDNISONE 10 MG/1
10 TABLET ORAL DAILY
COMMUNITY
Start: 2023-06-22

## 2023-06-23 ASSESSMENT — PATIENT HEALTH QUESTIONNAIRE - PHQ9
SUM OF ALL RESPONSES TO PHQ QUESTIONS 1-9: 0
SUM OF ALL RESPONSES TO PHQ QUESTIONS 1-9: 0
2. FEELING DOWN, DEPRESSED OR HOPELESS: 0
1. LITTLE INTEREST OR PLEASURE IN DOING THINGS: 0
SUM OF ALL RESPONSES TO PHQ QUESTIONS 1-9: 0
SUM OF ALL RESPONSES TO PHQ9 QUESTIONS 1 & 2: 0
SUM OF ALL RESPONSES TO PHQ QUESTIONS 1-9: 0

## 2023-06-23 NOTE — PROGRESS NOTES
1. Have you been to the ER, urgent care clinic since your last visit? Hospitalized since your last visit? NO    2. Have you seen or consulted any other health care providers outside of the [address] [place] [address] since your last visit? Include any pap smears or colon screening.   NO   Chief Complaint   Patient presents with    Follow-up     6 month follow up OV 12-27-22  HX OF E-COLI INFECTION     BP (!) 150/68 (Site: Left Upper Arm, Position: Sitting, Cuff Size: Medium Adult)   Pulse 83   Temp 97.4 °F (36.3 °C) (Temporal)   Resp 14   Ht 5' 2\" (1.575 m)   Wt 147 lb 9.6 oz (67 kg)   SpO2 95% Comment: ROOM AIR  BMI 27.00 kg/m²

## 2023-06-25 ASSESSMENT — ENCOUNTER SYMPTOMS
NAUSEA: 0
RECTAL PAIN: 0
DIARRHEA: 1
ANAL BLEEDING: 1
VOMITING: 0
BACK PAIN: 1
ABDOMINAL PAIN: 0
ALLERGIC/IMMUNOLOGIC NEGATIVE: 1
RESPIRATORY NEGATIVE: 1
CONSTIPATION: 0
ABDOMINAL DISTENTION: 0
BLOOD IN STOOL: 0

## 2023-06-29 ENCOUNTER — TELEPHONE (OUTPATIENT)
Age: 83
End: 2023-06-29

## 2023-10-20 ENCOUNTER — OFFICE VISIT (OUTPATIENT)
Age: 83
End: 2023-10-20
Payer: MEDICARE

## 2023-10-20 VITALS
DIASTOLIC BLOOD PRESSURE: 72 MMHG | RESPIRATION RATE: 18 BRPM | HEART RATE: 74 BPM | BODY MASS INDEX: 27.3 KG/M2 | HEIGHT: 62 IN | WEIGHT: 148.38 LBS | SYSTOLIC BLOOD PRESSURE: 146 MMHG | OXYGEN SATURATION: 97 % | TEMPERATURE: 97.8 F

## 2023-10-20 DIAGNOSIS — K56.2: Primary | ICD-10-CM

## 2023-10-20 PROCEDURE — 1090F PRES/ABSN URINE INCON ASSESS: CPT | Performed by: SURGERY

## 2023-10-20 PROCEDURE — G8427 DOCREV CUR MEDS BY ELIG CLIN: HCPCS | Performed by: SURGERY

## 2023-10-20 PROCEDURE — 99213 OFFICE O/P EST LOW 20 MIN: CPT | Performed by: SURGERY

## 2023-10-20 PROCEDURE — G8419 CALC BMI OUT NRM PARAM NOF/U: HCPCS | Performed by: SURGERY

## 2023-10-20 PROCEDURE — 1036F TOBACCO NON-USER: CPT | Performed by: SURGERY

## 2023-10-20 PROCEDURE — G8400 PT W/DXA NO RESULTS DOC: HCPCS | Performed by: SURGERY

## 2023-10-20 PROCEDURE — 1123F ACP DISCUSS/DSCN MKR DOCD: CPT | Performed by: SURGERY

## 2023-10-20 PROCEDURE — G8484 FLU IMMUNIZE NO ADMIN: HCPCS | Performed by: SURGERY

## 2023-10-20 ASSESSMENT — PATIENT HEALTH QUESTIONNAIRE - PHQ9
SUM OF ALL RESPONSES TO PHQ QUESTIONS 1-9: 0
1. LITTLE INTEREST OR PLEASURE IN DOING THINGS: 0
SUM OF ALL RESPONSES TO PHQ9 QUESTIONS 1 & 2: 0
2. FEELING DOWN, DEPRESSED OR HOPELESS: 0
SUM OF ALL RESPONSES TO PHQ QUESTIONS 1-9: 0

## 2023-10-20 NOTE — PROGRESS NOTES
Chief Complaint   Patient presents with    Follow-up     Laparoscopic Hemicolectomy. States is supposed to have another surgery in December. 1. Have you been to the ER, urgent care clinic since your last visit? Hospitalized since your last visit? No    2. Have you seen or consulted any other health care providers outside of the [address] [place] [address] Avenue since your last visit? Include any pap smears or colon screening.  No

## 2023-10-20 NOTE — PROGRESS NOTES
General surgery history and Physical    Patient: Jessica Brown  MRN: [id number]    YOB: 1940  Age: 82 y.o. Sex: female     Chief Complaint:  Chief Complaint   Patient presents with    Follow-up     Laparoscopic Hemicolectomy. States is supposed to have another surgery in December. History of Present Illness: Jessica Brown is a 82 y.o. very pleasant woman is here today follow-up laparoscopic right hemicolectomy. Patient continued having problem with diarrhea. Patient believes this is related to the antibiotics she is taking. Patient denies nausea. Denies weight loss however gained some weight. Social History:  Social Connections: Not on file       Past Medical History:  Past Medical History:   Diagnosis Date    Abdominal pain, left lower quadrant     Acid reflux     Acoustic neuroma ([address]) 05/2012    GAMMA KNIFE DR Jose Lou    Arthritis     Cerebral infarct ([address]) 6/27/2022    Change in bowel movement     Chronic obstructive pulmonary disease (HCC)     Chronic pain     left  leg    Colon polyps     BENIGN    Contusion of left wrist 12-    Degeneration of intervertebral disc of lumbar region     Degenerative lumbar disc     Diarrhea     Diverticulitis     Diverticulosis     Dysarthria     GERD (gastroesophageal reflux disease)     GERD (gastroesophageal reflux disease)     H/O fall 12/14/2021    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 Hearing loss of left ear     Heartburn     Hemorrhage of rectum     Hemorrhoids     Hyperkalemia 6/27/2022    Hypothyroid     Hypothyroidism, unspecified 6/27/2022    Intracranial and intraspinal phlebitis and thrombophlebitis     Loose stools     Monoplegia (HCC)     Nausea & vomiting     Other ill-defined conditions(799.89)     cavinous sinus thrombosis    Rib pain 12/14/2021    Shingles 2014    Slurred speech     Stroke ([address]) 08/2012    CAVERNOUS SINUS THROMBOSIS; RIGHT HAND WEAKNESS.     Unspecified lack of

## 2024-01-22 ENCOUNTER — TRANSCRIBE ORDERS (OUTPATIENT)
Facility: HOSPITAL | Age: 84
End: 2024-01-22

## 2024-01-22 ENCOUNTER — OFFICE VISIT (OUTPATIENT)
Age: 84
End: 2024-01-22
Payer: MEDICARE

## 2024-01-22 VITALS
RESPIRATION RATE: 19 BRPM | DIASTOLIC BLOOD PRESSURE: 63 MMHG | SYSTOLIC BLOOD PRESSURE: 121 MMHG | WEIGHT: 148 LBS | OXYGEN SATURATION: 97 % | HEART RATE: 85 BPM | BODY MASS INDEX: 27.07 KG/M2

## 2024-01-22 DIAGNOSIS — B35.1 ONYCHOMYCOSIS: Primary | ICD-10-CM

## 2024-01-22 DIAGNOSIS — Z12.31 VISIT FOR SCREENING MAMMOGRAM: Primary | ICD-10-CM

## 2024-01-22 PROCEDURE — G8484 FLU IMMUNIZE NO ADMIN: HCPCS | Performed by: PODIATRIST

## 2024-01-22 PROCEDURE — 1123F ACP DISCUSS/DSCN MKR DOCD: CPT | Performed by: PODIATRIST

## 2024-01-22 PROCEDURE — 11755 BIOPSY NAIL UNIT: CPT | Performed by: PODIATRIST

## 2024-01-22 PROCEDURE — G8400 PT W/DXA NO RESULTS DOC: HCPCS | Performed by: PODIATRIST

## 2024-01-22 PROCEDURE — 99203 OFFICE O/P NEW LOW 30 MIN: CPT | Performed by: PODIATRIST

## 2024-01-22 PROCEDURE — G8427 DOCREV CUR MEDS BY ELIG CLIN: HCPCS | Performed by: PODIATRIST

## 2024-01-22 PROCEDURE — 1090F PRES/ABSN URINE INCON ASSESS: CPT | Performed by: PODIATRIST

## 2024-01-22 PROCEDURE — 1036F TOBACCO NON-USER: CPT | Performed by: PODIATRIST

## 2024-01-22 PROCEDURE — G8419 CALC BMI OUT NRM PARAM NOF/U: HCPCS | Performed by: PODIATRIST

## 2024-01-22 NOTE — PROGRESS NOTES
Chief Complaint   Patient presents with    New Patient    Nail Problem           1. Have you been to the ER, urgent care clinic since your last visit?  Hospitalized since your last visit?No    2. Have you seen or consulted any other health care providers outside of the [facility] since your last visit?  Include any pap smears or colon screening. No    ./63 (Site: Left Upper Arm, Position: Sitting, Cuff Size: Medium Adult)   Pulse 85   Resp 19   Wt 67.1 kg (148 lb)   SpO2 97%   BMI 27.07 kg/m²

## 2024-01-29 NOTE — PROGRESS NOTES
[facility]  [facility]      Subjective:         Patient is a 83 y.o. female who is being seen as a new pt for thick/discolored toenails.  Patient denies any pain or trauma.  States she tried multiple over-the-counter creams/lotions without symptomatic relief.  Denies any recent diagnostic testing to confirm diagnosis.  Denies any other lower extremity complaints      Past Medical History:   Diagnosis Date    Abdominal pain, left lower quadrant     Acid reflux     Acoustic neuroma (HCC) 05/2012    GAMMA KNIFE DR MENDEZ    Arthritis     Cerebral infarct ([facility]) 6/27/2022    Change in bowel movement     Chronic obstructive pulmonary disease (HCC)     Chronic pain     left  leg    Colon polyps     BENIGN    Contusion of left wrist 12-    Degeneration of intervertebral disc of lumbar region     Degenerative lumbar disc     Diarrhea     Diverticulitis     Diverticulosis     Dysarthria     GERD (gastroesophageal reflux disease)     GERD (gastroesophageal reflux disease)     H/O fall 12/14/2021    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    Hearing loss of left ear     Heartburn     Hemorrhage of rectum     Hemorrhoids     Hyperkalemia 6/27/2022    Hypothyroid     Hypothyroidism, unspecified 6/27/2022    Intracranial and intraspinal phlebitis and thrombophlebitis     Loose stools     Monoplegia (HCC)     Nausea & vomiting     Other ill-defined conditions(799.89)     cavinous sinus thrombosis    Rib pain 12/14/2021    Shingles 2014    Slurred speech     Stroke ([facility]) 08/2012    CAVERNOUS SINUS THROMBOSIS; RIGHT HAND WEAKNESS.    Unspecified lack of coordination      Past Surgical History:   Procedure Laterality Date    CARDIAC CATHETERIZATION  2016    NEGATIVE PER PATIENT    CATARACT REMOVAL  2014    CERVICAL FUSION  2011    C5-7    CHOLECYSTECTOMY      CHOLECYSTECTOMY  2010    COLONOSCOPY  2015    COLONOSCOPY  01/09/2003    FLEXIBLE SIGMOIDOSCOPY

## 2024-02-15 ENCOUNTER — TELEPHONE (OUTPATIENT)
Age: 84
End: 2024-02-15

## 2024-02-15 NOTE — TELEPHONE ENCOUNTER
Pt informed of [facility] results being positive for fungus. Would like to know if she needs an otc or if you are sending something into her pharmacy.

## 2024-02-22 NOTE — TELEPHONE ENCOUNTER
Pt was informed of results on 02/15/24, during that conversation she stated that she wanted to speak to her  prior to deciding which rx she wanted to do. I called pt again today 02/22/24 to discuss treatment options and left voicemail for her to return call to office.

## 2024-03-18 ENCOUNTER — HOSPITAL ENCOUNTER (OUTPATIENT)
Facility: HOSPITAL | Age: 84
Discharge: HOME | End: 2024-03-21
Attending: FAMILY MEDICINE
Payer: MEDICARE

## 2024-03-18 DIAGNOSIS — Z12.31 VISIT FOR SCREENING MAMMOGRAM: ICD-10-CM

## 2024-03-18 PROCEDURE — 77063 BREAST TOMOSYNTHESIS BI: CPT

## 2025-02-13 ENCOUNTER — OFFICE VISIT (OUTPATIENT)
Age: 85
End: 2025-02-13
Payer: MEDICARE

## 2025-02-13 VITALS
RESPIRATION RATE: 17 BRPM | HEART RATE: 79 BPM | DIASTOLIC BLOOD PRESSURE: 55 MMHG | TEMPERATURE: 97.5 F | BODY MASS INDEX: 27 KG/M2 | WEIGHT: 143 LBS | OXYGEN SATURATION: 95 % | HEIGHT: 61 IN | SYSTOLIC BLOOD PRESSURE: 141 MMHG

## 2025-02-13 DIAGNOSIS — R19.5 LOOSE STOOLS: ICD-10-CM

## 2025-02-13 DIAGNOSIS — I73.9 PAD (PERIPHERAL ARTERY DISEASE) (HCC): Primary | ICD-10-CM

## 2025-02-13 PROCEDURE — 1123F ACP DISCUSS/DSCN MKR DOCD: CPT | Performed by: SURGERY

## 2025-02-13 PROCEDURE — 99212 OFFICE O/P EST SF 10 MIN: CPT | Performed by: SURGERY

## 2025-02-13 PROCEDURE — G8400 PT W/DXA NO RESULTS DOC: HCPCS | Performed by: SURGERY

## 2025-02-13 PROCEDURE — 1126F AMNT PAIN NOTED NONE PRSNT: CPT | Performed by: SURGERY

## 2025-02-13 PROCEDURE — 1036F TOBACCO NON-USER: CPT | Performed by: SURGERY

## 2025-02-13 PROCEDURE — 1090F PRES/ABSN URINE INCON ASSESS: CPT | Performed by: SURGERY

## 2025-02-13 PROCEDURE — G8419 CALC BMI OUT NRM PARAM NOF/U: HCPCS | Performed by: SURGERY

## 2025-02-13 PROCEDURE — G8427 DOCREV CUR MEDS BY ELIG CLIN: HCPCS | Performed by: SURGERY

## 2025-02-13 RX ORDER — AZELASTINE 1 MG/ML
SPRAY, METERED NASAL
COMMUNITY
Start: 2024-08-22

## 2025-02-13 ASSESSMENT — PATIENT HEALTH QUESTIONNAIRE - PHQ9
2. FEELING DOWN, DEPRESSED OR HOPELESS: NOT AT ALL
1. LITTLE INTEREST OR PLEASURE IN DOING THINGS: NOT AT ALL
SUM OF ALL RESPONSES TO PHQ QUESTIONS 1-9: 0
SUM OF ALL RESPONSES TO PHQ9 QUESTIONS 1 & 2: 0
SUM OF ALL RESPONSES TO PHQ QUESTIONS 1-9: 0

## 2025-02-13 NOTE — PROGRESS NOTES
Identified pt with two pt identifiers (name and ). Reviewed chart in preparation for visit and have obtained necessary documentation.    Hui Arredondo is a 84 y.o. female  Chief Complaint   Patient presents with    Follow-up     right hemicolectomy     BP (!) 141/55 (Site: Right Upper Arm)   Pulse 79   Temp 97.5 °F (36.4 °C) (Oral)   Resp 17   Ht 1.549 m (5' 1\")   Wt 64.9 kg (143 lb)   SpO2 95%   BMI 27.02 kg/m²     1. Have you been to the ER, urgent care clinic since your last visit?  Hospitalized since your last visit?no    2. Have you seen or consulted any other health care providers outside of the [facility] since your last visit?  Include any pap smears or colon screening. No    Patient and provider made aware of elevated BP x2. Patient asymptomatic. Patient reminded to monitor BP, continue to take BP medications if prescribed, and follow up with PCP/Cardiologist.  Patient expressed understanding and agreement.

## 2025-02-15 PROBLEM — I73.9 PAD (PERIPHERAL ARTERY DISEASE) (HCC): Status: ACTIVE | Noted: 2025-02-15

## 2025-02-15 NOTE — PROGRESS NOTES
Vascular History and Physical    Patient: Hui Arredondo  MRN: [id number]    YOB: 1940  Age: 84 y.o.  Sex: female     Chief Complaint:  Chief Complaint   Patient presents with    Follow-up     right hemicolectomy       History of Present Illness: Hui Arredondo is a 84 y.o. very pleasant woman had a right hemicolectomy for cecal bascule.  And right now she is having problem with loose stool bowel movement 3 times daily.  Concerning some nausea as well.  Social History:  Social Connections: Not on file       Past Medical History:  Past Medical History:   Diagnosis Date    Abdominal pain, left lower quadrant     Acid reflux     Acoustic neuroma (HCC) 05/2012    GAMMA KNIFE DR MENDEZ    Arthritis     Cerebral infarct ([facility]) 6/27/2022    Change in bowel movement     Chronic obstructive pulmonary disease (HCC)     Chronic pain     left  leg    Colon polyps     BENIGN    Contusion of left wrist 12-    Degeneration of intervertebral disc of lumbar region     Degenerative lumbar disc     Diarrhea     Diverticulitis     Diverticulosis     Dysarthria     GERD (gastroesophageal reflux disease)     GERD (gastroesophageal reflux disease)     H/O fall 12/14/2021    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    Hearing loss of left ear     Heartburn     Hemorrhage of rectum     Hemorrhoids     Hyperkalemia 6/27/2022    Hypothyroid     Hypothyroidism, unspecified 6/27/2022    Intracranial and intraspinal phlebitis and thrombophlebitis     Loose stools     Monoplegia ([facility])     Nausea & vomiting     Other ill-defined conditions(799.89)     cavinous sinus thrombosis    Rib pain 12/14/2021    Shingles 2014    Slurred speech     Stroke (HCC) 08/2012    CAVERNOUS SINUS THROMBOSIS; RIGHT HAND WEAKNESS.    Unspecified lack of coordination        Surgical History:  Past Surgical History:   Procedure Laterality Date    CARDIAC CATHETERIZATION  2016    NEGATIVE PER PATIENT    CATARACT REMOVAL

## 2025-02-25 ENCOUNTER — TELEPHONE (OUTPATIENT)
Age: 85
End: 2025-02-25

## 2025-02-25 NOTE — TELEPHONE ENCOUNTER
Patient didn't have CT Scan. She will need to call [phone] to schedule her testing. She will need to reschedule her appointment if she doesn't have the test before her appointment.    Response sent via [employer].

## 2025-02-26 DIAGNOSIS — R19.5 LOOSE STOOLS: ICD-10-CM

## 2025-02-26 DIAGNOSIS — K56.2: Primary | ICD-10-CM

## 2025-02-26 NOTE — TELEPHONE ENCOUNTER
Called patient could not reach patient left a message that according to  he wants the patient to have CT abd and pelvis done before he sees the patient in office left the number to central scheduling and told the patient if they get an appointment before March 6 we will keep the appointment if not we will have to cancel appointment and r/s.

## 2025-03-13 ENCOUNTER — HOSPITAL ENCOUNTER (OUTPATIENT)
Facility: HOSPITAL | Age: 85
Discharge: HOME | End: 2025-03-16
Attending: SURGERY
Payer: MEDICARE

## 2025-03-13 DIAGNOSIS — R19.5 LOOSE STOOLS: ICD-10-CM

## 2025-03-13 DIAGNOSIS — K56.2: ICD-10-CM

## 2025-03-13 LAB — CREAT BLD-MCNC: 0.8 MG/DL (ref 0.6–1.3)

## 2025-03-13 PROCEDURE — 6360000004 HC RX CONTRAST MEDICATION: Performed by: SURGERY

## 2025-03-13 PROCEDURE — 74177 CT ABD & PELVIS W/CONTRAST: CPT

## 2025-03-13 PROCEDURE — 82565 ASSAY OF CREATININE: CPT

## 2025-03-13 RX ORDER — DIATRIZOATE MEGLUMINE AND DIATRIZOATE SODIUM 660; 100 MG/ML; MG/ML
30 SOLUTION ORAL; RECTAL
Status: DISCONTINUED | OUTPATIENT
Start: 2025-03-13 | End: 2025-03-17 | Stop reason: HOSPADM

## 2025-03-13 RX ORDER — IOPAMIDOL 755 MG/ML
100 INJECTION, SOLUTION INTRAVASCULAR
Status: COMPLETED | OUTPATIENT
Start: 2025-03-13 | End: 2025-03-13

## 2025-03-13 RX ADMIN — IOPAMIDOL 100 ML: 755 INJECTION, SOLUTION INTRAVENOUS at 11:25

## 2025-04-16 ENCOUNTER — HOSPITAL ENCOUNTER (OUTPATIENT)
Facility: HOSPITAL | Age: 85
Discharge: HOME | End: 2025-04-19
Attending: FAMILY MEDICINE
Payer: MEDICARE

## 2025-04-16 DIAGNOSIS — Z12.31 VISIT FOR SCREENING MAMMOGRAM: ICD-10-CM

## 2025-04-16 PROCEDURE — 77067 SCR MAMMO BI INCL CAD: CPT

## 2025-04-18 ENCOUNTER — OFFICE VISIT (OUTPATIENT)
Age: 85
End: 2025-04-18
Payer: MEDICARE

## 2025-04-18 VITALS
TEMPERATURE: 97.7 F | BODY MASS INDEX: 26.24 KG/M2 | OXYGEN SATURATION: 93 % | HEART RATE: 78 BPM | RESPIRATION RATE: 17 BRPM | HEIGHT: 61 IN | DIASTOLIC BLOOD PRESSURE: 67 MMHG | WEIGHT: 139 LBS | SYSTOLIC BLOOD PRESSURE: 170 MMHG

## 2025-04-18 DIAGNOSIS — R19.8 CHANGE IN BOWEL MOVEMENT: Primary | ICD-10-CM

## 2025-04-18 PROCEDURE — 99212 OFFICE O/P EST SF 10 MIN: CPT | Performed by: SURGERY

## 2025-04-18 PROCEDURE — 1036F TOBACCO NON-USER: CPT | Performed by: SURGERY

## 2025-04-18 PROCEDURE — 1126F AMNT PAIN NOTED NONE PRSNT: CPT | Performed by: SURGERY

## 2025-04-18 PROCEDURE — 1090F PRES/ABSN URINE INCON ASSESS: CPT | Performed by: SURGERY

## 2025-04-18 PROCEDURE — G8400 PT W/DXA NO RESULTS DOC: HCPCS | Performed by: SURGERY

## 2025-04-18 PROCEDURE — G8419 CALC BMI OUT NRM PARAM NOF/U: HCPCS | Performed by: SURGERY

## 2025-04-18 PROCEDURE — G8427 DOCREV CUR MEDS BY ELIG CLIN: HCPCS | Performed by: SURGERY

## 2025-04-18 PROCEDURE — 1123F ACP DISCUSS/DSCN MKR DOCD: CPT | Performed by: SURGERY

## 2025-04-18 RX ORDER — LOPERAMIDE HYDROCHLORIDE 1 MG/5ML
1 SOLUTION ORAL 3 TIMES DAILY PRN
Qty: 118 ML | Refills: 1 | Status: SHIPPED | OUTPATIENT
Start: 2025-04-18 | End: 2025-05-04

## 2025-04-18 ASSESSMENT — PATIENT HEALTH QUESTIONNAIRE - PHQ9
SUM OF ALL RESPONSES TO PHQ QUESTIONS 1-9: 0
2. FEELING DOWN, DEPRESSED OR HOPELESS: NOT AT ALL
1. LITTLE INTEREST OR PLEASURE IN DOING THINGS: NOT AT ALL
SUM OF ALL RESPONSES TO PHQ QUESTIONS 1-9: 0

## 2025-04-28 NOTE — PROGRESS NOTES
Identified pt with two pt identifiers (name and ). Reviewed chart in preparation for visit and have obtained necessary documentation.    Hui Arredondo is a 84 y.o. female  Chief Complaint   Patient presents with    Results     CT results      BP (!) 170/67 (BP Site: Right Upper Arm)   Pulse 78   Temp 97.7 °F (36.5 °C) (Oral)   Resp 17   Ht 1.549 m (5' 1\")   Wt 63 kg (139 lb)   SpO2 93%   BMI 26.26 kg/m²     1. Have you been to the ER, urgent care clinic since your last visit?  Hospitalized since your last visit?no    2. Have you seen or consulted any other health care providers outside of the [facility] since your last visit?  Include any pap smears or colon screening. No    Patient and provider made aware of elevated BP x2. Patient asymptomatic. Patient reminded to monitor BP, continue to take BP medications if prescribed, and follow up with PCP/Cardiologist.  Patient expressed understanding and agreement.    
TABS tablet Take 2 tablets by mouth daily      fluticasone (FLONASE) 50 MCG/ACT nasal spray 2 sprays by Nasal route daily      fluticasone-umeclidin-vilant (TRELEGY ELLIPTA) 100-62.5-25 MCG/ACT AEPB inhaler Inhale 1 puff into the lungs daily      guaiFENesin (MUCINEX MAXIMUM STRENGTH) 1200 MG TB12 Take 1,200 mg by mouth as needed      levothyroxine (SYNTHROID) 100 MCG tablet Take 1 tablet by mouth every morning (before breakfast)      montelukast (SINGULAIR) 10 MG tablet Take 1 tablet by mouth daily      omeprazole (PRILOSEC OTC) 20 MG tablet Take 1 tablet by mouth daily      tolterodine (DETROL LA) 4 MG extended release capsule Take 1 capsule by mouth daily      predniSONE (DELTASONE) 10 MG tablet Take 1 tablet by mouth daily      colestipol (COLESTID) 1 g tablet Take 1 tablet by mouth 2 times daily 60 tablet 3    aspirin 325 MG EC tablet Take 1 tablet by mouth daily       No current facility-administered medications for this visit.       Review of Systems:  I reviewed the rest of organ systems personally and they are negative.  Pertinent findings discussed in HPI.    Physical Examination    BP (!) 170/67 (BP Site: Right Upper Arm)   Pulse 78   Temp 97.7 °F (36.5 °C) (Oral)   Resp 17   Ht 1.549 m (5' 1\")   Wt 63 kg (139 lb)   SpO2 93%   BMI 26.26 kg/m²   Gen: Well developed, well nourished 84 y.o. female in no acute distress  Head: normocephalic, atraumatic  Mouth: Clear, no overt lesions, oral mucosa pink and moist  Neck: supple, no masses, no adenopathy or carotid bruits, trachea midline  Resp: clear to auscultation bilaterally, no wheeze, rhonchi or rales, excursions normal and symmetrical  Cardio: Regular rate and rhythm, no murmurs, clicks, gallops or rubs, no edema or varicosities  Abdomen: soft, nontender, nondistended, normoactive bowel sounds, no hernias, no hepatosplenomegaly   Neuro: sensation and strength grossly intact and symmetrical  Psych: alert and oriented to person, place and

## (undated) DEVICE — DRAIN SURG W7XL20CM SIL SMOOTH FLAT 3/4 PERF DBL WRP

## (undated) DEVICE — RELOAD STPL L75MM OPN H3.8MM CLS 1.5MM WIRE DIA0.2MM REG

## (undated) DEVICE — HANDPIECE SET WITH BONE CLEANING TIP AND SUCTION TUBE: Brand: INTERPULSE

## (undated) DEVICE — Device

## (undated) DEVICE — HEADLESS TROCHAR PIN 75MM: Brand: ZUK

## (undated) DEVICE — TROCAR: Brand: KII FIOS FIRST ENTRY

## (undated) DEVICE — STAPLER INT L75MM CUT LN L73MM STPL LN L77MM BLU B FRM 8

## (undated) DEVICE — DRAIN KT WND 10FR RND 400ML --

## (undated) DEVICE — 6619 2 PTNT ISO SYS INCISE AREA&LT;(&GT;&&LT;)&GT;P: Brand: STERI-DRAPE™ IOBAN™ 2

## (undated) DEVICE — STERILE POLYISOPRENE POWDER-FREE SURGICAL GLOVES: Brand: PROTEXIS

## (undated) DEVICE — 3M™ STERI-STRIP™ REINFORCED ADHESIVE SKIN CLOSURES, R1542, 1/4 IN X 1-1/2 IN (6 MM X 38 MM), 6 STRIPS/ENVELOPE: Brand: 3M™ STERI-STRIP™

## (undated) DEVICE — SUTURE VCRL SZ 2-0 L27IN ABSRB UD L26MM SH 1/2 CIR J417H

## (undated) DEVICE — SUTURE VCRL + SZ 0 L27IN ABSRB UD CT-1 L36MM 1/2 CIR TAPR VCP260H

## (undated) DEVICE — SPONGE LAP 18X18IN STRL -- 5/PK

## (undated) DEVICE — T4 HOOD

## (undated) DEVICE — SOL INJ SOD CL 0.9% 1000ML BG --

## (undated) DEVICE — ACCESS PLATFORM FOR MINIMALLY INVASIVE SURGERY.: Brand: GELPORT® LAPAROSCOPIC  SYSTEM

## (undated) DEVICE — SUTURE ABSRB BRAID COAT UD OS-6 NO 1 27IN VCRL J535H

## (undated) DEVICE — INFECTION CONTROL KIT SYS

## (undated) DEVICE — LAPAROSCOPIC CHOLE PACK: Brand: MEDLINE INDUSTRIES, INC.

## (undated) DEVICE — SOL IRR STRL H2O 1000ML BTL --

## (undated) DEVICE — 2, DISPOSABLE SUCTION/IRRIGATOR WITHOUT DISPOSABLE TIP: Brand: STRYKEFLOW

## (undated) DEVICE — SOLUTION IRRIG 500ML 0.9% SOD CHL USP POUR PLAS BTL

## (undated) DEVICE — SOLUTION IV 50ML 0.9% SOD CHL

## (undated) DEVICE — BLADE SAW W073XL276IN THK0031IN CUT THK0036IN REPL SAG

## (undated) DEVICE — PADDING CST 6IN STERILE --

## (undated) DEVICE — SUT MONOCRYL PLUS UD 4-0 --

## (undated) DEVICE — PADDING CAST SPEC 6INX4YD COT --

## (undated) DEVICE — SOLUTION IV 250ML 0.9% SOD CHL CLR INJ FLX BG CONT PRT CLSR

## (undated) DEVICE — SOLUTION IRRIGATION NACL 0.9% 1000 ML FLX CONTAINER

## (undated) DEVICE — ADHESIVE SKIN CLOSURE 4X22 CM PREMIERPRO EXOFINFUSION DISP

## (undated) DEVICE — REM POLYHESIVE ADULT PATIENT RETURN ELECTRODE: Brand: VALLEYLAB

## (undated) DEVICE — SOUTHSIDE TURNOVER: Brand: MEDLINE INDUSTRIES, INC.

## (undated) DEVICE — SPONGE DRAIN NONWOVEN 4X4IN -- 2/PK

## (undated) DEVICE — SCRUB DRY SURG EZ SCRUB BRUSH PREOPERATIVE GRN

## (undated) DEVICE — BOWL BNE CEM MIX SPAT CURET SMARTMIX CTS

## (undated) DEVICE — SKIN CLOS DERMABND PRINEO 60CM -- DERMABOUND PRINEO

## (undated) DEVICE — STRIP,CLOSURE,WOUND,MEDI-STRIP,1/2X4: Brand: MEDLINE

## (undated) DEVICE — Z DISCONTINUED USE 2744636  DRESSING AQUACEL 14 IN ALG W3.5XL14IN POLYUR FLM CVR W/ HYDRCOLL

## (undated) DEVICE — SUTURE VCRL SZ 3-0 L27IN ABSRB UD CP-2 L26MM 1/2 CIR REV J868H

## (undated) DEVICE — DEVON™ KNEE AND BODY STRAP 60" X 3" (1.5 M X 7.6 CM): Brand: DEVON

## (undated) DEVICE — TUBING INSUFFLATOR HEAT HUMIDIFIED SMK EVAC SET PNEUMOCLEAR

## (undated) DEVICE — TOTAL TRAY, 16FR 10ML SIL FOLEY, URN: Brand: MEDLINE

## (undated) DEVICE — PREP SKN PREVAIL 40ML APPL --

## (undated) DEVICE — INTENDED FOR TISSUE SEPARATION, AND OTHER PROCEDURES THAT REQUIRE A SHARP SURGICAL BLADE TO PUNCTURE OR CUT.: Brand: BARD-PARKER ® CARBON RIB-BACK BLADES

## (undated) DEVICE — STRETCH BANDAGE ROLL: Brand: DERMACEA

## (undated) DEVICE — TAPE,CLOTH/SILK,CURAD,3"X10YD,LF,40/CS: Brand: CURAD

## (undated) DEVICE — STERILE POLYISOPRENE POWDER-FREE SURGICAL GLOVES WITH EMOLLIENT COATING: Brand: PROTEXIS

## (undated) DEVICE — TOWEL,OR,DSP,ST,BLUE,DLX,6/PK,12PK/CS: Brand: MEDLINE

## (undated) DEVICE — TROCAR: Brand: KII SLEEVE

## (undated) DEVICE — SYRINGE MED 20ML STD CLR PLAS LUERLOCK TIP N CTRL DISP

## (undated) DEVICE — BLADE SAW W0.49XL3.15IN THK0.047IN CUT THK0.047IN REPL SAG

## (undated) DEVICE — TROCARS: Brand: KII® BALLOON BLUNT TIP SYSTEM

## (undated) DEVICE — SUTURE MCRYL SZ 2-0 L36IN ABSRB UD L36MM CT-1 1/2 CIR Y945H

## (undated) DEVICE — MASTISOL ADHESIVE LIQ 2/3ML

## (undated) DEVICE — PILLOW POS AD L7IN R FOAM HD REST INTUB SLOT DISP

## (undated) DEVICE — SOLUTION IV 1000ML 0.9% SOD CHL

## (undated) DEVICE — SYRINGE IRRIG 60ML SFT PLIABLE BLB EZ TO GRP 1 HND USE W/

## (undated) DEVICE — MEDI-VAC NON-CONDUCTIVE SUCTION TUBING: Brand: CARDINAL HEALTH

## (undated) DEVICE — SUTURE VCRL SZ 2-0 L36IN ABSRB UD L40MM CT 1/2 CIR J957H

## (undated) DEVICE — SUTURE STRATAFIX SYMMETRIC PDS + SZ 1 L18IN ABSRB VLT L48MM SXPP1A400

## (undated) DEVICE — DRAPE XR C ARM 41X74IN LF --

## (undated) DEVICE — E-Z CLEAN, PTFE COATED, ELECTROSURGICAL LAPAROSCOPIC ELECTRODE, L-HOOK, 33 CM., SINGLE-USE, FOR USE WITH HAND CONTROL PENCIL: Brand: MEGADYNE

## (undated) DEVICE — BLADE SAW W083XL354IN THK0047IN CUT THK0047IN SAG FLR

## (undated) DEVICE — SUTURE PERMA-HAND 0 L18IN NONABSORBABLE BLK CT-1 L36MM 1/2 C021D

## (undated) DEVICE — BONE WAX WHITE: Brand: BONE WAX WHITE

## (undated) DEVICE — GARMENT,MEDLINE,DVT,INT,CALF,MED, GEN2: Brand: MEDLINE

## (undated) DEVICE — KENDALL SCD EXPRESS SLEEVES, KNEE LENGTH, MEDIUM: Brand: KENDALL SCD

## (undated) DEVICE — SOLUTION SURG PREP 26 CC PURPREP

## (undated) DEVICE — SUTURE VCRL 2-0 L27IN ABSRB UD CP-2 L26MM 1/2 CIR REV CUT J869H

## (undated) DEVICE — ERASECAUTI INTERMIT TRAY: Brand: MEDLINE INDUSTRIES, INC.

## (undated) DEVICE — LAMINECTOMY RICHMOND-LF: Brand: MEDLINE INDUSTRIES, INC.

## (undated) DEVICE — SOLUTION IRRIG 1000ML H2O STRL BLT

## (undated) DEVICE — SUTURE MCRYL SZ 4 0 L18IN ABSRB VLT PS 1 L24MM 3 8 CIR REV Y682H

## (undated) DEVICE — 4-PORT MANIFOLD: Brand: NEPTUNE 2

## (undated) DEVICE — SUTURE PERMAHAND SZ 3-0 L18IN NONABSORBABLE BLK L26MM SH C013D

## (undated) DEVICE — GAUZE SPONGES,12 PLY: Brand: CURITY

## (undated) DEVICE — SURGIFOAM SPNG SZ 100

## (undated) DEVICE — DRAPE,U/ SHT,SPLIT,PLAS,STERIL: Brand: MEDLINE

## (undated) DEVICE — ZIMMER® STERILE DISPOSABLE TOURNIQUET CUFF WITH PLC, DUAL PORT, SINGLE BLADDER, 34 IN. (86 CM)

## (undated) DEVICE — 3M™ IOBAN™ 2 ANTIMICROBIAL INCISE DRAPE 6651EZ: Brand: IOBAN™ 2

## (undated) DEVICE — ULNAR NERVE PROTECTOR FOAM POSITIONER: Brand: CARDINAL HEALTH

## (undated) DEVICE — KIT,ANTI FOG,W/SPONGE & FLUID,SOFT PACK: Brand: MEDLINE

## (undated) DEVICE — SUTURE VCRL + SZ 3-0 L18IN ABSRB UD SH 1/2 CIR TAPERCUT NDL VCP864D

## (undated) DEVICE — BANDAGE COMPR M W6INXL10YD WHT BGE VELC E MTRX HK AND LOOP

## (undated) DEVICE — SUTURE SZ 0 27IN 5/8 CIR UR-6  TAPER PT VIOLET ABSRB VICRYL J603H

## (undated) DEVICE — GLOVE ORANGE PI 7 1/2   MSG9075

## (undated) DEVICE — CONTAINER,SPECIMEN,3OZ,OR STRL: Brand: MEDLINE

## (undated) DEVICE — FLOSEAL HEMOSTATIC MATRIX, 10 ML: Brand: FLOSEAL

## (undated) DEVICE — SUTURE VCRL SZ 2 L54IN ABSRB UD L65MM TP-1 1/2 CIR J880T

## (undated) DEVICE — SOL IRR SOD CL 0.9% 3000ML --

## (undated) DEVICE — COUNTER NDL 60 COUNT HLD 120 DBL MAG 4 BX PER CA

## (undated) DEVICE — SUTURE ABSORBABLE BRAIDED 2-0 CT-1 27 IN UD VICRYL J259H

## (undated) DEVICE — SUTURE NONABSORBABLE MONOFILAMENT 2-0 FS 18 IN ETHILON 664H

## (undated) DEVICE — STRAP,POSITIONING,KNEE/BODY,FOAM,4X60": Brand: MEDLINE

## (undated) DEVICE — SOLUTION IRRIG 3000ML 0.9% SOD CHL FLX CONT 0797208] ICU MEDICAL INC]

## (undated) DEVICE — DRAPE,EXTREMITY,89X128,STERILE: Brand: MEDLINE

## (undated) DEVICE — LAPAROSCOPIC SCISSORS: Brand: EPIX LAPAROSCOPIC SCISSORS

## (undated) DEVICE — TRAY CATH 16F URIN MTR LTX -- CONVERT TO ITEM 363111

## (undated) DEVICE — PREP SKN CHLRAPRP APL 26ML STR --

## (undated) DEVICE — DERMABOND SKIN ADH 0.7ML -- DERMABOND ADVANCED 12/BX

## (undated) DEVICE — SYR 20ML LL STRL LF --

## (undated) DEVICE — HYPODERMIC SAFETY NEEDLE: Brand: MONOJECT

## (undated) DEVICE — GOWN,SIRUS,NONRNF,SETINSLV,2XL,18/CS: Brand: MEDLINE

## (undated) DEVICE — DRSG POSTOP PRMSL AG 3.5X14IN

## (undated) DEVICE — BIPOLAR IRRIGATOR INTEGRATED TUBING AND BIPOLAR CORD SET, DISPOSABLE

## (undated) DEVICE — 3-0 COATED VICRYL PLUS UNDYED 1X27" SH --

## (undated) DEVICE — STAPLER SKIN LEG L3.9MM DIA0.53MM WIDE ROT HD FOR WND CLSR

## (undated) DEVICE — SUTURE VCRL SZ 0 L36IN ABSRB VLT L40MM CT 1/2 CIR J358H